# Patient Record
Sex: FEMALE | Race: WHITE | Employment: OTHER | URBAN - METROPOLITAN AREA
[De-identification: names, ages, dates, MRNs, and addresses within clinical notes are randomized per-mention and may not be internally consistent; named-entity substitution may affect disease eponyms.]

---

## 2017-01-10 NOTE — TELEPHONE ENCOUNTER
Returned patient's daughter call patient has been experiencing increased joint pain all over. She has been out of Humira for awhile she is not sure if insurance will cover this or not but would like to see if they would.

## 2017-01-17 ENCOUNTER — OFFICE VISIT (OUTPATIENT)
Dept: RHEUMATOLOGY | Age: 77
End: 2017-01-17

## 2017-01-17 VITALS
DIASTOLIC BLOOD PRESSURE: 80 MMHG | BODY MASS INDEX: 29.77 KG/M2 | OXYGEN SATURATION: 95 % | HEIGHT: 63 IN | TEMPERATURE: 98.3 F | SYSTOLIC BLOOD PRESSURE: 131 MMHG | RESPIRATION RATE: 22 BRPM | WEIGHT: 168 LBS | HEART RATE: 82 BPM

## 2017-01-17 DIAGNOSIS — M05.79 SEROPOSITIVE RHEUMATOID ARTHRITIS OF MULTIPLE SITES (HCC): Primary | ICD-10-CM

## 2017-01-17 RX ORDER — VENLAFAXINE HYDROCHLORIDE 37.5 MG/1
37.5 CAPSULE, EXTENDED RELEASE ORAL DAILY
COMMUNITY

## 2017-01-17 RX ORDER — DOCUSATE SODIUM 100 MG/1
100 CAPSULE, LIQUID FILLED ORAL 2 TIMES DAILY
COMMUNITY
End: 2018-07-07

## 2017-01-17 RX ORDER — TRAZODONE HYDROCHLORIDE 150 MG/1
200 TABLET ORAL
Status: ON HOLD | COMMUNITY
End: 2018-07-07 | Stop reason: CLARIF

## 2017-01-17 RX ORDER — METHOTREXATE 2.5 MG/1
7.5 TABLET ORAL
Status: ON HOLD | COMMUNITY
End: 2018-07-07

## 2017-01-17 RX ORDER — FOLIC ACID 1 MG/1
1 TABLET ORAL DAILY
COMMUNITY

## 2017-01-17 RX ORDER — ACETAMINOPHEN 325 MG/1
650 TABLET ORAL
COMMUNITY

## 2017-01-17 RX ORDER — POLYVINYL ALCOHOL 14 MG/ML
1 SOLUTION/ DROPS OPHTHALMIC AS NEEDED
COMMUNITY
End: 2019-07-16

## 2017-01-17 RX ORDER — CEFUROXIME AXETIL 250 MG/1
250 TABLET ORAL DAILY
COMMUNITY
End: 2017-04-17 | Stop reason: ALTCHOICE

## 2017-01-17 NOTE — PROGRESS NOTES
RHEUMATOLOGY PROBLEM LIST AND CHIEF COMPLAINT  1. Rheumatoid arthritis (1995) - polyarthritis, significant deformity of multiple joints with subluxation, positive JOAN (1:160), positive CCP, positive rheumatoid factor    Therapy History:  Prior NSAIDs:   Prior DMARDs: Remicade (allergy), hydroxychloroquine (not effective)  Current NSAIDs:  Current DMARDs: Humira (stopped in 2010, 2015-04/2016, 1/2017-current), methotrexate (current)    2. Osteoarthritis     INTERVAL HISTORY  This is a 78 y.o.  female. Today, the patient complains of pain in the neck. Severity:  8 on a scale of 0-10. Timing: all day   Context/Associated signs and symptoms: The patient states that she has restarted Humira, but she has only taken one injection because it was just recently approved. She complains of pain in her right knee and left foot. She states that her hands continue to have morning stiffness, but she believes her first Humira injection has helped. She reports that when she was getting a shower from a nurses aid, she scrubbed her foot too hard and that the next day her foot was inflamed. She continues on  Methotrexate 15 mg weekly and Humira 40 mg every 2 weeks. She continues to use steroid drops for her keratitis. She denies any new medical issues or starting any new medications. RHEUMATOLOGY REVIEW OF SYSTEMS   Positives as per history  Negatives as follows:  Mac Ji:  Denies unexplained persistent fevers or weight change  RESPIRATORY:  No pleuritic pain, exertional dyspnea  CARDIOVASCULAR:  Denies chest pain  GASTRO:   Denies heartburn, abdominal pain, nausea, vomiting, diarrhea  SKIN:    Denies rash   MSK:    No morning stiffness >1 hour    PAST MEDICAL HISTORY  Reviewed with patient, significant changes in medical history - no    FAMILY HISTORY  rheumatoid arthritis     PHYSICAL EXAM  Blood pressure 131/80, pulse 82, temperature 98.3 °F (36.8 °C), temperature source Oral, resp.  rate 22, height 5' 2.5\" (1.588 m), weight 168 lb (76.2 kg), SpO2 95 %. GENERAL APPEARANCE: Well-nourished, no acute distress  NECK: No adenopathy  ENT: No oral ulcers  CARDIOVASCULAR: Heart rhythm is regular. No murmur, rub, gallop  CHEST: Normal vesicular breath sounds. No wheezes, rales, pleural friction rubs  ABDOMINAL: The abdomen is soft and nontender. Bowel sounds are normal  SKIN: No rash, palpable purpura, digital ulcer, abnormal thickening   NEUROLOGICAL: antalgic gait, using walker  MUSCULOSKELETAL:   Upper extremities - ulnar deviation with boutonniere deformity and swan-neck deformity of bilateral hands. Subluxation of joints. Heberden's and Vivi's nodes noted. Wrist decreased ROM B/L with tenderness and warmth  Lower extremities - bony prominence, tenderness, warmth over bilateral knee joints R>L. Right knee large effusion. Bilateral ankle swelling. Left foot soft tissue swelling and erythema. LABS, RADIOLOGY AND PROCEDURES   Previous labs reviewed - No new labs    ASSESSMENT  1. Rheumatoid arthritis (Established problem -  Progressive disease) - The patient has restarted Humira and received her first injection. She continues to have active inflammation, but Humira can take up to 3 months to become effective. She had thought she decreased her methotrexate to 7.5 mg weekly because of hair thinning, but after verification from NH she is still on 15mg. I will inject her right knee with 40 mg depo-medrol today. Her left foot appears to have a skin irritation/infection. I do not suspect that the erythema and swelling of her left foot is from inflammation of a joint. I have recommended that she have her foot be evaluated by her PCP or the physician at her nursing home. If this is truly a skin infection, then she may need to hold her Humira until it is healed. Otherwise, she should continue Humira 40 mg q2 weeks and methotrexate 7.5 mg weekly. I will check routine labs today.   2. Keratitis - (Established problem - Progressive disease) - She continues on 3 prednisolone drops in the left eye for this. She should follow up with ophthalmology to see if this can be reduced once humira begins to work. 3. Osteoarthritis - This likely characterizes pain in her hips, knees, and shoulder. OTC meds and tramadol are recommended. 4. Drug therapy monitoring for toxicity (methotrexate) - CBC, BUN, Cr, AST, ALT and albumin every 4-6 months   5. Bone density - bone scan ordered in 12/2015 but has not been performed, she currently uses vitamin D supplementation. PLAN  1. Humira 40 mg every 14 days  2. Methotrexate to 7.5 mg weekly with folic acid 1 mg daily  3. Check CBC, CMP, markers of inflammation (ESR, CRP)  4. Right knee injection    Ada Arellano MD, [address] [place] [address]   Adult and Pediatric Rheumatology     [address] [place] [address], [facility], Phone [phone], Fax [phone]     Visiting  of Pediatrics    Department of Pediatrics, [facility] of [address] [place] [address], Phone [phone], Fax [phone]    cc:  Darya Levine MD    Written by blanco Dodge, as dictated by Gina Soni. Chirag Arellano M.D.     ARTHRITIS AND OSTEOPOROSIS CENTER [facility]  OFFICE PROCEDURE PROGRESS NOTE        Chart reviewed for the following:   Phoenix LIVINGSTON, have reviewed the History, Physical and updated the Allergic reactions for June [facility] performed immediately prior to start of procedure:   Phoenix LIVINGSTON, have performed the following reviews on June Williams prior to the start of the procedure:            * Patient was identified by name and date of birth   * Agreement on procedure being performed was verified  * Risks and Benefits explained to the patient  * Procedure site verified and marked as necessary  * Patient was positioned for comfort  * Consent was signed and verified     Time: 3:40 pm      Date of procedure: 1/17/2017    Procedure performed by: Wang Matias MD    Provider assisted by: none    Patient assisted by: son    How tolerated by patient: tolerated the procedure well with no complications     Post Procedural Pain Scale: 0 - No Hurt    Comments: none      PROCEDURE  After consent was obtained, using sterile technique the right knee was prepped and Depo-medrol 40 mg and 1ml of lidocaine was then injected and the needle withdrawn. The procedure was well tolerated. The patient is asked to continue to rest for 24 hours before resuming regular activities. It may be more painful for the first 1-2 days. Watch for fever, or increased swelling or persistent pain. Call or return to clinic prn if such symptoms occur.

## 2017-01-17 NOTE — MR AVS SNAPSHOT
Visit Information Date & Time Provider Department Dept. Phone Encounter #  
 1/17/2017  2:40 PM Kenneth Rutherford MD Arthritis and [facility] [id number] Upcoming Health Maintenance Date Due DTaP/Tdap/Td series (1 - Tdap) 12/26/1963 BREAST CANCER SCRN MAMMOGRAM 12/26/1992 FOBT Q 1 YEAR AGE 50-75 12/26/1992 ZOSTER VACCINE AGE 60> 12/26/2002 GLAUCOMA SCREENING Q2Y 12/26/2007 Pneumococcal 65+ Low/Medium Risk (1 of 2 - PCV13) 12/26/2007 MEDICARE YEARLY EXAM 12/26/2007 INFLUENZA AGE 9 TO ADULT 8/1/2016 Allergies as of 1/17/2017  Review Complete On: 1/17/2017 By: Adia Colon LPN Severity Noted Reaction Type Reactions Codeine  01/17/2017    Nausea Only Current Immunizations  Never Reviewed No immunizations on file. Not reviewed this visit Vitals BP Pulse Temp Resp Height(growth percentile) Weight(growth percentile) 131/80 (BP 1 Location: Left arm, BP Patient Position: Sitting) 82 98.3 °F (36.8 °C) (Oral) 22 5' 2.5\" (1.588 m) 168 lb (76.2 kg) SpO2 BMI OB Status Smoking Status 95% 30.24 kg/m2 Postmenopausal Former Smoker BMI and BSA Data Body Mass Index Body Surface Area  
 30.24 kg/m 2 1.83 m 2 Preferred Pharmacy Pharmacy Name Phone [facility], [address] [facility] [phone] Your Updated Medication List  
  
   
This list is accurate as of: 1/17/17  3:59 PM.  Always use your most recent med list.  
  
  
  
  
 cefUROXime 250 mg tablet Commonly known as:  CEFTIN Take 250 mg by mouth daily. clonazePAM 0.5 mg tablet Commonly known as:  Melburn End Take 0.5 mg by mouth three (3) times daily. cyclobenzaprine 10 mg tablet Commonly known as:  FLEXERIL Take 1 Tab by mouth daily. diclofenac 0.1 % ophthalmic solution Commonly known as:  VOLTAREN  
  
 divalproex  mg tablet Commonly known as:  DEPAKOTE Take 250 mg by mouth daily. docusate sodium 100 mg capsule Commonly known as:  Miguel Angel Rohit Take 100 mg by mouth two (2) times a day. escitalopram oxalate 20 mg tablet Commonly known as:  Ruth Mederos Take 20 mg by mouth daily. folic acid 1 mg tablet Commonly known as:  Google Take  by mouth daily. * HUMIRA 40 mg/0.8 mL injection Generic drug:  adalimumab  
by SubCUTAneous route. Duplicate * adalimumab 40 mg/0.8 mL Pnkt Commonly known as:  HUMIRA PEN  
0.8 mL by SubCUTAneous route every fourteen (14) days. * HYDROcodone-acetaminophen 5-325 mg per tablet Commonly known as:  Viva Lizama Take 1 Tab by mouth every eight (8) hours as needed. * HYDROcodone-acetaminophen 5-325 mg per tablet Commonly known as:  Viva Lizama Take 1 Tab by mouth every eight (8) hours as needed for Pain. Max Daily Amount: 3 Tabs. levothyroxine 125 mcg tablet Commonly known as:  SYNTHROID  
  
 lovastatin 40 mg tablet Commonly known as:  MEVACOR Take 40 mg by mouth daily. * meclizine 25 mg tablet Commonly known as:  ANTIVERT  
  
 * meclizine 12.5 mg tablet Commonly known as:  ANTIVERT Take 12.5 mg by mouth two (2) times a day. methotrexate 2.5 mg tablet Commonly known as:  Laura Flakes Take 15 mg by mouth every Sunday. Takes 6 tabs  
  
 metoprolol tartrate 25 mg tablet Commonly known as:  LOPRESSOR Take 25 mg by mouth two (2) times a day. mirtazapine 45 mg tablet Commonly known as:  [place] Avery Take 45 mg by mouth nightly. OLANZapine 10 mg tablet Commonly known as:  ZyPREXA Take 10 mg by mouth nightly. Takes 15 mg at bedtime  
  
 polyvinyl alcohol 1.4 % ophthalmic solution Commonly known as:  Martina Oms Administer 1 Drop to both eyes as needed. prednisoLONE acetate 1 % ophthalmic suspension Commonly known as:  PRED FORTE Administer 1 Drop to left eye four (4) times daily. * predniSONE 5 mg tablet Commonly known as:  Shaggy Soni * predniSONE 5 mg tablet Commonly known as:  DELTASONE  
20mg x 3 days, 15mg x 3 days, 10mg x 3 days, 5mg x 3 days SENNA Take 2 Tabs by mouth once. traMADol 50 mg tablet Commonly known as:  ULTRAM  
Take 50 mg by mouth. Takes 4 times a day  
  
 traZODone 150 mg tablet Commonly known as:  Chris José Take 150 mg by mouth nightly. TYLENOL 325 mg tablet Generic drug:  acetaminophen Take  by mouth every four (4) hours as needed for Pain. venlafaxine- mg capsule Commonly known as:  EFFEXOR-XR Take  by mouth daily. VITAMIN D3 1,000 unit Cap Generic drug:  cholecalciferol Take  by mouth. Not Taking  
  
 zolpidem 10 mg tablet Commonly known as:  AMBIEN Take 10 mg by mouth nightly. * Notice: This list has 8 medication(s) that are the same as other medications prescribed for you. Read the directions carefully, and ask your doctor or other care provider to review them with you. Introducing [facility]! Yenifer Bateman introduces eToro patient portal. Now you can access parts of your medical record, email your doctor's office, and request medication refills online. 1. In your internet browser, go to [email or web address] Monster 2. Click on the First Time User? Click Here link in the Sign In box. You will see the New Member Sign Up page. 3. Enter your eToro Access Code exactly as it appears below. You will not need to use this code after youve completed the sign-up process. If you do not sign up before the expiration date, you must request a new code. · eToro Access Code: [email or web address] Expires: 4/17/2017  3:59 PM 
 
4. Enter the last four digits of your Social Security Number (xxxx) and Date of Birth (mm/dd/yyyy) as indicated and click Submit. You will be taken to the next sign-up page. 5. Create a eToro ID.  This will be your eToro login ID and cannot be changed, so think of one that is secure and easy to remember. 6. Create a [employer] password. You can change your password at any time. 7. Enter your Password Reset Question and Answer. This can be used at a later time if you forget your password. 8. Enter your e-mail address. You will receive e-mail notification when new information is available in [address] 9. Click Sign Up. You can now view and download portions of your medical record. 10. Click the Download Summary menu link to download a portable copy of your medical information. If you have questions, please visit the Frequently Asked Questions section of the [employer] website. Remember, [employer] is NOT to be used for urgent needs. For medical emergencies, dial 911. Now available from your iPhone and Android! Please provide this summary of care documentation to your next provider. Your primary care clinician is listed as Riccardo Moritz. If you have any questions after today's visit, please call [phone].

## 2017-01-19 ENCOUNTER — TELEPHONE (OUTPATIENT)
Dept: RHEUMATOLOGY | Age: 77
End: 2017-01-19

## 2017-01-19 LAB
ALBUMIN SERPL-MCNC: 4.3 G/DL (ref 3.5–4.8)
ALBUMIN/GLOB SERPL: 1.3 {RATIO} (ref 1.1–2.5)
ALP SERPL-CCNC: 105 IU/L (ref 39–117)
ALT SERPL-CCNC: 9 IU/L (ref 0–32)
AST SERPL-CCNC: 13 IU/L (ref 0–40)
BASOPHILS # BLD MANUAL: 0.2 X10E3/UL (ref 0–0.2)
BASOPHILS NFR BLD MANUAL: 3 %
BILIRUB SERPL-MCNC: <0.2 MG/DL (ref 0–1.2)
BUN SERPL-MCNC: 13 MG/DL (ref 8–27)
BUN/CREAT SERPL: 20 (ref 11–26)
CALCIUM SERPL-MCNC: 9.6 MG/DL (ref 8.7–10.3)
CHLORIDE SERPL-SCNC: 96 MMOL/L (ref 96–106)
CO2 SERPL-SCNC: 26 MMOL/L (ref 18–29)
CREAT SERPL-MCNC: 0.65 MG/DL (ref 0.57–1)
CRP SERPL-MCNC: 12 MG/L (ref 0–4.9)
DIFFERENTIAL COMMENT, 115260: NORMAL
EOSINOPHIL # BLD MANUAL: 0.2 X10E3/UL (ref 0–0.4)
EOSINOPHIL NFR BLD MANUAL: 3 %
ERYTHROCYTE [DISTWIDTH] IN BLOOD BY AUTOMATED COUNT: 13.5 % (ref 12.3–15.4)
ERYTHROCYTE [SEDIMENTATION RATE] IN BLOOD BY WESTERGREN METHOD: 71 MM/HR (ref 0–40)
GLOBULIN SER CALC-MCNC: 3.4 G/DL (ref 1.5–4.5)
GLUCOSE SERPL-MCNC: 97 MG/DL (ref 65–99)
HCT VFR BLD AUTO: 36.9 % (ref 34–46.6)
HGB BLD-MCNC: 12.6 G/DL (ref 11.1–15.9)
LYMPHOCYTES # BLD MANUAL: 1.5 X10E3/UL (ref 0.7–3.1)
LYMPHOCYTES NFR BLD MANUAL: 24 %
MCH RBC QN AUTO: 31.8 PG (ref 26.6–33)
MCHC RBC AUTO-ENTMCNC: 34.1 G/DL (ref 31.5–35.7)
MCV RBC AUTO: 93 FL (ref 79–97)
MONOCYTES # BLD MANUAL: 0.3 X10E3/UL (ref 0.1–0.9)
MONOCYTES NFR BLD MANUAL: 4 %
NEUTROPHILS # BLD MANUAL: 4.2 X10E3/UL (ref 1.4–7)
NEUTROPHILS NFR BLD MANUAL: 66 %
PLATELET # BLD AUTO: 364 X10E3/UL (ref 150–379)
PLATELET BLD QL SMEAR: ADEQUATE
POTASSIUM SERPL-SCNC: 4.9 MMOL/L (ref 3.5–5.2)
PROT SERPL-MCNC: 7.7 G/DL (ref 6–8.5)
RBC # BLD AUTO: 3.96 X10E6/UL (ref 3.77–5.28)
RBC MORPH BLD: NORMAL
SODIUM SERPL-SCNC: 139 MMOL/L (ref 134–144)
WBC # BLD AUTO: 6.3 X10E3/UL (ref 3.4–10.8)

## 2017-01-19 NOTE — TELEPHONE ENCOUNTER
Per Dr Kevin solano for patient to continue to take 15 mg weekly notified nurse Finn Rodriguez at [facility].

## 2017-01-19 NOTE — TELEPHONE ENCOUNTER
Received a call from Navid Richards at [facility] [phone] where patient resides. She noticed on the after visit summary you were decreasing MTX to 7.5 mg.  Currently patient is taking MTX 15 mg weekly per the nurse patient gets confused at times. For a short period of time patient decreased it when she thought her hair was thinning but then decided to restart the 15 mg weekly. Please advise.

## 2017-01-20 RX ORDER — LIDOCAINE HYDROCHLORIDE 10 MG/ML
1 INJECTION INFILTRATION; PERINEURAL ONCE
Qty: 1 VIAL | Refills: 0
Start: 2017-01-21 | End: 2017-01-21

## 2017-02-10 ENCOUNTER — TELEPHONE (OUTPATIENT)
Dept: RHEUMATOLOGY | Age: 77
End: 2017-02-10

## 2017-02-10 NOTE — TELEPHONE ENCOUNTER
Returned patient's call she thought her knee was feeling better but then she started experiencing the pain & swelling again. Patient would like to be seen for a follow-up appointment. Transferred patient to  for PSR to schedule appointment.

## 2017-02-23 ENCOUNTER — HOSPITAL ENCOUNTER (INPATIENT)
Age: 77
LOS: 5 days | Discharge: NURSING, REHAB OR LONG-TERM CARE | DRG: 812 | End: 2017-03-01
Attending: EMERGENCY MEDICINE | Admitting: INTERNAL MEDICINE
Payer: MEDICARE

## 2017-02-23 ENCOUNTER — APPOINTMENT (OUTPATIENT)
Dept: GENERAL RADIOLOGY | Age: 77
DRG: 812 | End: 2017-02-23
Attending: EMERGENCY MEDICINE
Payer: MEDICARE

## 2017-02-23 DIAGNOSIS — R42 ORTHOSTATIC LIGHTHEADEDNESS: Primary | ICD-10-CM

## 2017-02-23 DIAGNOSIS — D50.8 OTHER IRON DEFICIENCY ANEMIA: ICD-10-CM

## 2017-02-23 DIAGNOSIS — M25.551 PAIN OF BOTH HIP JOINTS: ICD-10-CM

## 2017-02-23 DIAGNOSIS — R55 SYNCOPE AND COLLAPSE: ICD-10-CM

## 2017-02-23 DIAGNOSIS — M25.552 PAIN OF BOTH HIP JOINTS: ICD-10-CM

## 2017-02-23 LAB
ALBUMIN SERPL BCP-MCNC: 2.9 G/DL (ref 3.5–5)
ALBUMIN/GLOB SERPL: 0.8 {RATIO} (ref 1.1–2.2)
ALP SERPL-CCNC: 90 U/L (ref 45–117)
ALT SERPL-CCNC: 33 U/L (ref 12–78)
ANION GAP BLD CALC-SCNC: 10 MMOL/L (ref 5–15)
AST SERPL W P-5'-P-CCNC: 29 U/L (ref 15–37)
BASOPHILS # BLD AUTO: 0 K/UL
BASOPHILS # BLD: 0 %
BILIRUB SERPL-MCNC: 0.2 MG/DL (ref 0.2–1)
BUN SERPL-MCNC: 14 MG/DL (ref 6–20)
BUN/CREAT SERPL: 13 (ref 12–20)
CALCIUM SERPL-MCNC: 8 MG/DL (ref 8.5–10.1)
CHLORIDE SERPL-SCNC: 99 MMOL/L (ref 97–108)
CK SERPL-CCNC: 31 U/L (ref 26–192)
CO2 SERPL-SCNC: 27 MMOL/L (ref 21–32)
CREAT SERPL-MCNC: 1.11 MG/DL (ref 0.55–1.02)
EOSINOPHIL # BLD: 0 K/UL
EOSINOPHIL NFR BLD: 0 %
ERYTHROCYTE [DISTWIDTH] IN BLOOD BY AUTOMATED COUNT: 13.9 % (ref 11.5–14.5)
GLOBULIN SER CALC-MCNC: 3.5 G/DL (ref 2–4)
GLUCOSE SERPL-MCNC: 125 MG/DL (ref 65–100)
HCT VFR BLD AUTO: 26.6 % (ref 35–47)
HGB BLD-MCNC: 9 G/DL (ref 11.5–16)
INR PPP: 1.1 (ref 0.9–1.1)
LYMPHOCYTES # BLD AUTO: 4 %
LYMPHOCYTES # BLD: 0.3 K/UL
MCH RBC QN AUTO: 32 PG (ref 26–34)
MCHC RBC AUTO-ENTMCNC: 33.8 G/DL (ref 30–36.5)
MCV RBC AUTO: 94.7 FL (ref 80–99)
MONOCYTES # BLD: 0.6 K/UL
MONOCYTES NFR BLD AUTO: 7 %
NEUTS BAND NFR BLD MANUAL: 5 %
NEUTS SEG # BLD: 7.2 K/UL
NEUTS SEG NFR BLD AUTO: 84 %
PLATELET # BLD AUTO: 230 K/UL (ref 150–400)
POTASSIUM SERPL-SCNC: 3.5 MMOL/L (ref 3.5–5.1)
PROT SERPL-MCNC: 6.4 G/DL (ref 6.4–8.2)
PROTHROMBIN TIME: 10.9 SEC (ref 9–11.1)
RBC # BLD AUTO: 2.81 M/UL (ref 3.8–5.2)
RBC MORPH BLD: ABNORMAL
SODIUM SERPL-SCNC: 136 MMOL/L (ref 136–145)
TROPONIN I SERPL-MCNC: <0.04 NG/ML
WBC # BLD AUTO: 8.1 K/UL (ref 3.6–11)
WBC MORPH BLD: ABNORMAL

## 2017-02-23 PROCEDURE — 93005 ELECTROCARDIOGRAM TRACING: CPT

## 2017-02-23 PROCEDURE — 84484 ASSAY OF TROPONIN QUANT: CPT | Performed by: EMERGENCY MEDICINE

## 2017-02-23 PROCEDURE — 74011250636 HC RX REV CODE- 250/636: Performed by: EMERGENCY MEDICINE

## 2017-02-23 PROCEDURE — 85610 PROTHROMBIN TIME: CPT | Performed by: EMERGENCY MEDICINE

## 2017-02-23 PROCEDURE — 77030013169 SET IV BLD ICUM -A

## 2017-02-23 PROCEDURE — 82550 ASSAY OF CK (CPK): CPT | Performed by: EMERGENCY MEDICINE

## 2017-02-23 PROCEDURE — 96360 HYDRATION IV INFUSION INIT: CPT

## 2017-02-23 PROCEDURE — 73521 X-RAY EXAM HIPS BI 2 VIEWS: CPT

## 2017-02-23 PROCEDURE — 85025 COMPLETE CBC W/AUTO DIFF WBC: CPT | Performed by: EMERGENCY MEDICINE

## 2017-02-23 PROCEDURE — 36415 COLL VENOUS BLD VENIPUNCTURE: CPT | Performed by: EMERGENCY MEDICINE

## 2017-02-23 PROCEDURE — 80053 COMPREHEN METABOLIC PANEL: CPT | Performed by: EMERGENCY MEDICINE

## 2017-02-23 PROCEDURE — 99285 EMERGENCY DEPT VISIT HI MDM: CPT

## 2017-02-23 RX ADMIN — SODIUM CHLORIDE 1000 ML: 900 INJECTION, SOLUTION INTRAVENOUS at 23:16

## 2017-02-23 NOTE — IP AVS SNAPSHOT
[address] [facility] 
[phone] Patient: Niharika Galaviz MRN: [id number] :1940 You are allergic to the following Allergen Reactions Codeine Nausea Only Recent Documentation Height  
  
  
  
  
  
 1.575 m Emergency Contacts Name Discharge Info Relation Home Work Mobile Reva Garcia  Daughter [21] [phone] Juan Mckeon [24] [phone] Nadira Roman  Other Relative [6] [phone] About your hospitalization You were admitted on:  2017 You last received care in the:  [facility] 3 NEUROSCIENCE TELEMETRY You were discharged on:  2017 Unit phone number:  [phone] Why you were hospitalized Your primary diagnosis was:  Not on File Your diagnoses also included:  Syncope Providers Seen During Your Hospitalizations Provider Role Specialty Primary office phone Peng Menon MD Attending Provider Emergency Medicine [phone] Tatiana Blankenship MD Attending Provider Internal Medicine [phone] Your Primary Care Physician (PCP) Primary Care Physician Office Phone Office Fax Jerrod Plummer [phone] Follow-up Information Follow up With Details Comments Contact Info Darya Levine MD In 5 days  [address] [facility] [address] [place] [address] 
[phone] Brandi Ward MD In 2 weeks  [address] [facility] 
[phone] Current Discharge Medication List  
  
START taking these medications Dose & Instructions Dispensing Information Comments Morning Noon Evening Bedtime  
 ferrous sulfate 325 mg (65 mg iron) tablet Commonly known as:  Iron (Ferrous Sulfate) Your next dose is: Today, Tomorrow Other:  _________ Dose:  325 mg Take 1 Tab by mouth two (2) times a day. Quantity:  30 Tab Refills:  0  
     
   
   
   
  
 fluconazole 200 mg tablet Commonly known as:  DIFLUCAN Your next dose is: Today, Tomorrow Other:  _________ Dose:  200 mg Take 1 Tab by mouth daily for 13 days. FDA advises cautious prescribing of oral fluconazole in pregnancy. Quantity:  13 Tab Refills:  0  
     
   
   
   
  
 pantoprazole 40 mg tablet Commonly known as:  PROTONIX Your next dose is: Today, Tomorrow Other:  _________ Dose:  40 mg Take 1 Tab by mouth Daily (before breakfast). Quantity:  30 Tab Refills:  0 CONTINUE these medications which have CHANGED Dose & Instructions Dispensing Information Comments Morning Noon Evening Bedtime  
 predniSONE 5 mg tablet Commonly known as:  Cassy Gelineau What changed:   
- additional instructions - Another medication with the same name was removed. Continue taking this medication, and follow the directions you see here. Your next dose is: Today, Tomorrow Other:  _________ 20mg x 3 days, 15mg x 3 days, 10mg x 3 days, 5mg x 3 days Quantity:  30 Tab Refills:  1 CONTINUE these medications which have NOT CHANGED Dose & Instructions Dispensing Information Comments Morning Noon Evening Bedtime  
 cefUROXime 250 mg tablet Commonly known as:  CEFTIN Your next dose is: Today, Tomorrow Other:  _________ Dose:  250 mg Take 250 mg by mouth daily. Refills:  0  
     
   
   
   
  
 clonazePAM 0.5 mg tablet Commonly known as:  Gwyndolucia Rasher Your next dose is: Today, Tomorrow Other:  _________ Dose:  0.5 mg Take 0.5 mg by mouth three (3) times daily. Refills:  0  
     
   
   
   
  
 docusate sodium 100 mg capsule Commonly known as:  Ledell Mohsen Your next dose is: Today, Tomorrow Other:  _________  Dose:  100 mg  
 Take 100 mg by mouth two (2) times a day. Refills:  0  
     
   
   
   
  
 folic acid 1 mg tablet Commonly known as:  Google Your next dose is: Today, Tomorrow Other:  _________ Take  by mouth daily. Refills:  0  
     
   
   
   
  
 * HUMIRA 40 mg/0.8 mL injection Generic drug:  adalimumab Your next dose is: Today, Tomorrow Other:  _________  
   
   
 by SubCUTAneous route. Duplicate Refills:  0  
     
   
   
   
  
 * adalimumab 40 mg/0.8 mL Pnkt Commonly known as:  HUMIRA PEN Your next dose is: Today, Tomorrow Other:  _________ Dose:  40 mg  
0.8 mL by SubCUTAneous route every fourteen (14) days. Quantity:  2 Kit Refills:  6 HYDROcodone-acetaminophen 5-325 mg per tablet Commonly known as:  Laura Arts Your next dose is: Today, Tomorrow Other:  _________ Dose:  1 Tab Take 1 Tab by mouth every eight (8) hours as needed. Max Daily Amount: 3 Tabs. Quantity:  10 Tab Refills:  0  
     
   
   
   
  
 lovastatin 40 mg tablet Commonly known as:  MEVACOR Your next dose is: Today, Tomorrow Other:  _________ Dose:  40 mg Take 40 mg by mouth daily. Refills:  0  
     
   
   
   
  
 methotrexate 2.5 mg tablet Commonly known as:  Neosho Blanca Your next dose is: Today, Tomorrow Other:  _________ Dose:  15 mg Take 15 mg by mouth every Sunday. Takes 6 tabs Refills:  0  
     
   
   
   
  
 metoprolol tartrate 25 mg tablet Commonly known as:  LOPRESSOR Your next dose is: Today, Tomorrow Other:  _________ Dose:  25 mg Take 25 mg by mouth two (2) times a day. Refills:  0  
     
   
   
   
  
 mirtazapine 45 mg tablet Commonly known as:  Sadie Martin Your next dose is: Today, Tomorrow Other:  _________ Dose:  45 mg Take 45 mg by mouth nightly. Refills:  0 OLANZapine 10 mg tablet Commonly known as:  ZyPREXA Your next dose is: Today, Tomorrow Other:  _________ Dose:  10 mg Take 10 mg by mouth nightly. Takes 15 mg at bedtime Refills:  0  
     
   
   
   
  
 polyvinyl alcohol 1.4 % ophthalmic solution Commonly known as:  Homa Saliva Your next dose is: Today, Tomorrow Other:  _________ Dose:  1 Drop Administer 1 Drop to both eyes as needed. Refills:  0  
     
   
   
   
  
 prednisoLONE acetate 1 % ophthalmic suspension Commonly known as:  PRED FORTE Your next dose is: Today, Tomorrow Other:  _________ Dose:  1 Drop Administer 1 Drop to left eye three (3) times daily. Refills:  0 SENNA Your next dose is: Today, Tomorrow Other:  _________ Dose:  2 Tab Take 2 Tabs by mouth once. Refills:  0  
     
   
   
   
  
 traZODone 150 mg tablet Commonly known as:  Emaline Sober Your next dose is: Today, Tomorrow Other:  _________ Dose:  150 mg Take 150 mg by mouth nightly. Refills:  0  
     
   
   
   
  
 TYLENOL 325 mg tablet Generic drug:  acetaminophen Your next dose is: Today, Tomorrow Other:  _________ Take  by mouth every four (4) hours as needed for Pain. Refills:  0  
     
   
   
   
  
 venlafaxine- mg capsule Commonly known as:  EFFEXOR-XR Your next dose is: Today, Tomorrow Other:  _________ Take  by mouth daily. Refills:  0  
     
   
   
   
  
 * Notice: This list has 2 medication(s) that are the same as other medications prescribed for you. Read the directions carefully, and ask your doctor or other care provider to review them with you. STOP taking these medications   
 traMADol 50 mg tablet Commonly known as:  ULTRAM  
   
  
  
  
Where to Get Your Medications Information on where to get these meds will be given to you by the nurse or doctor. ! Ask your nurse or doctor about these medications  
  ferrous sulfate 325 mg (65 mg iron) tablet  
 fluconazole 200 mg tablet HYDROcodone-acetaminophen 5-325 mg per tablet  
 pantoprazole 40 mg tablet Discharge Instructions None Discharge Orders None [facility] Announcement We are excited to announce that we are making your provider's discharge notes available to you in IdealSeat. You will see these notes when they are completed and signed by the physician that discharged you from your recent hospital stay. If you have any questions or concerns about any information you see in IdealSeat, please call the Health Information Department where you were seen or reach out to your Primary Care Provider for more information about your plan of care. Introducing [facility] SERVICES! Janine Aden introduces IdealSeat patient portal. Now you can access parts of your medical record, email your doctor's office, and request medication refills online. 1. In your internet browser, go to [email or web address] 2. Click on the First Time User? Click Here link in the Sign In box. You will see the New Member Sign Up page. 3. Enter your Ide[employer]Se[employer] Access Code exactly as it appears below. You will not need to use this code after youve completed the sign-up process. If you do not sign up before the expiration date, you must request a new code. · Ide[employer]Seat Access Code: [email or web address] Expires: 4/17/2017  3:59 PM 
 
4. Enter the last four digits of your Social Security Number (xxxx) and Date of Birth (mm/dd/yyyy) as indicated and click Submit. You will be taken to the next sign-up page. 5. Create a IdealSeat ID. This will be your Ide[employer]Seat login ID and cannot be changed, so think of one that is secure and easy to remember. 6. Create a Ide[employer]Seat password. You can change your password at any time. 7. Enter your Password Reset Question and Answer. This can be used at a later time if you forget your password. 8. Enter your e-mail address. You will receive e-mail notification when new information is available in [address] 9. Click Sign Up. You can now view and download portions of your medical record. 10. Click the Download Summary menu link to download a portable copy of your medical information. If you have questions, please visit the Frequently Asked Questions section of the [employer] website. Remember, [employer] is NOT to be used for urgent needs. For medical emergencies, dial 911. Now available from your iPhone and Android! General Information Please provide this summary of care documentation to your next provider. Patient Signature:  ____________________________________________________________ Date:  ____________________________________________________________  
  
Mandeep Artist Provider Signature:  ____________________________________________________________ Date:  ____________________________________________________________

## 2017-02-23 NOTE — IP AVS SNAPSHOT
Current Discharge Medication List  
  
Take these medications at their scheduled times Dose & Instructions Dispensing Information Comments Morning Noon Evening Bedtime * adalimumab 40 mg/0.8 mL Pnkt Commonly known as:  HUMIRA PEN Your next dose is: Today, Tomorrow Other:  ____________ Dose:  40 mg  
0.8 mL by SubCUTAneous route every fourteen (14) days. Quantity:  2 Kit Refills:  6  
     
   
   
   
  
 cefUROXime 250 mg tablet Commonly known as:  CEFTIN Your next dose is: Today, Tomorrow Other:  ____________ Dose:  250 mg Take 250 mg by mouth daily. Refills:  0  
     
   
   
   
  
 clonazePAM 0.5 mg tablet Commonly known as:  Fay Aw Your next dose is: Today, Tomorrow Other:  ____________ Dose:  0.5 mg Take 0.5 mg by mouth three (3) times daily. Refills:  0  
     
   
   
   
  
 docusate sodium 100 mg capsule Commonly known as:  Miguel Angel Rohit Your next dose is: Today, Tomorrow Other:  ____________ Dose:  100 mg Take 100 mg by mouth two (2) times a day. Refills:  0  
     
   
   
   
  
 ferrous sulfate 325 mg (65 mg iron) tablet Commonly known as:  Iron (Ferrous Sulfate) Your next dose is: Today, Tomorrow Other:  ____________ Dose:  325 mg Take 1 Tab by mouth two (2) times a day. Quantity:  30 Tab Refills:  0  
     
   
   
   
  
 fluconazole 200 mg tablet Commonly known as:  DIFLUCAN Your next dose is: Today, Tomorrow Other:  ____________ Dose:  200 mg Take 1 Tab by mouth daily for 13 days. FDA advises cautious prescribing of oral fluconazole in pregnancy. Quantity:  13 Tab Refills:  0  
     
   
   
   
  
 folic acid 1 mg tablet Commonly known as:  Google Your next dose is: Today, Tomorrow Other:  ____________ Take  by mouth daily. Refills:  0 lovastatin 40 mg tablet Commonly known as:  MEVACOR Your next dose is: Today, Tomorrow Other:  ____________ Dose:  40 mg Take 40 mg by mouth daily. Refills:  0  
     
   
   
   
  
 methotrexate 2.5 mg tablet Commonly known as:  Rina Schimke Your next dose is: Today, Tomorrow Other:  ____________ Dose:  15 mg Take 15 mg by mouth every Sunday. Takes 6 tabs Refills:  0  
     
   
   
   
  
 metoprolol tartrate 25 mg tablet Commonly known as:  LOPRESSOR Your next dose is: Today, Tomorrow Other:  ____________ Dose:  25 mg Take 25 mg by mouth two (2) times a day. Refills:  0  
     
   
   
   
  
 mirtazapine 45 mg tablet Commonly known as:  Louana Brooklyn Your next dose is: Today, Tomorrow Other:  ____________ Dose:  45 mg Take 45 mg by mouth nightly. Refills:  0  
     
   
   
   
  
 OLANZapine 10 mg tablet Commonly known as:  ZyPREXA Your next dose is: Today, Tomorrow Other:  ____________ Dose:  10 mg Take 10 mg by mouth nightly. Takes 15 mg at bedtime Refills:  0  
     
   
   
   
  
 pantoprazole 40 mg tablet Commonly known as:  PROTONIX Your next dose is: Today, Tomorrow Other:  ____________ Dose:  40 mg Take 1 Tab by mouth Daily (before breakfast). Quantity:  30 Tab Refills:  0  
     
   
   
   
  
 prednisoLONE acetate 1 % ophthalmic suspension Commonly known as:  PRED FORTE Your next dose is: Today, Tomorrow Other:  ____________ Dose:  1 Drop Administer 1 Drop to left eye three (3) times daily. Refills:  0 SENNA Your next dose is: Today, Tomorrow Other:  ____________ Dose:  2 Tab Take 2 Tabs by mouth once. Refills:  0  
     
   
   
   
  
 traZODone 150 mg tablet Commonly known as:  Maravilla Antonio Your next dose is: Today, Tomorrow Other:  ____________ Dose:  150 mg Take 150 mg by mouth nightly. Refills:  0  
     
   
   
   
  
 venlafaxine- mg capsule Commonly known as:  EFFEXOR-XR Your next dose is: Today, Tomorrow Other:  ____________ Take  by mouth daily. Refills:  0  
     
   
   
   
  
 * Notice: This list has 1 medication(s) that are the same as other medications prescribed for you. Read the directions carefully, and ask your doctor or other care provider to review them with you. Take these medications as needed Dose & Instructions Dispensing Information Comments Morning Noon Evening Bedtime HYDROcodone-acetaminophen 5-325 mg per tablet Commonly known as:  Martha Fothergill Your next dose is: Today, Tomorrow Other:  ____________ Dose:  1 Tab Take 1 Tab by mouth every eight (8) hours as needed. Max Daily Amount: 3 Tabs. Quantity:  10 Tab Refills:  0  
     
   
   
   
  
 polyvinyl alcohol 1.4 % ophthalmic solution Commonly known as:  Green Grout Your next dose is: Today, Tomorrow Other:  ____________ Dose:  1 Drop Administer 1 Drop to both eyes as needed. Refills:  0  
     
   
   
   
  
 TYLENOL 325 mg tablet Generic drug:  acetaminophen Your next dose is: Today, Tomorrow Other:  ____________ Take  by mouth every four (4) hours as needed for Pain. Refills:  0 Take these medications as directed Dose & Instructions Dispensing Information Comments Morning Noon Evening Bedtime * HUMIRA 40 mg/0.8 mL injection Generic drug:  adalimumab Your next dose is: Today, Tomorrow Other:  ____________  
   
   
 by SubCUTAneous route. Duplicate Refills:  0  
     
   
   
   
  
 predniSONE 5 mg tablet Commonly known as:  Randall Fossa Your next dose is: Today, Tomorrow Other:  ____________ 20mg x 3 days, 15mg x 3 days, 10mg x 3 days, 5mg x 3 days Quantity:  30 Tab Refills:  1  
     
   
   
   
  
 * Notice: This list has 1 medication(s) that are the same as other medications prescribed for you. Read the directions carefully, and ask your doctor or other care provider to review them with you. Where to Get Your Medications Information about where to get these medications is not yet available ! Ask your nurse or doctor about these medications  
  ferrous sulfate 325 mg (65 mg iron) tablet  
 fluconazole 200 mg tablet HYDROcodone-acetaminophen 5-325 mg per tablet  
 pantoprazole 40 mg tablet

## 2017-02-23 NOTE — IP AVS SNAPSHOT
Summary of Care Report The Summary of Care report has been created to help improve care coordination. Users with access to [employer] or [address] (Web-based application) may access additional patient information including the Discharge Summary. If you are not currently a [address] user and need more information, please call the number listed below in the [place] [address] section and ask to be connected with Medical Records. Facility Information Name Address Phone [address] [address] [phone] Patient Information Patient Name Sex CHEPE Hdz ([id number]) Female 1940 Discharge Information Admitting Provider Service Area Unit  
 Arsenio Mccartney MD / [facility] [place]  / [phone] Discharge Provider Discharge Date/Time Discharge Disposition Destination Elizabeth Parmar MD / [phone] 3/1/2017 Morning (Pending) SNF (none) Patient Language Language ENGLISH [13] Problem List as of 3/1/2017  Date Reviewed: 2017 Codes Priority Class Noted - Resolved Syncope ICD-10-CM: R55 
ICD-9-CM: 780.2   2017 - Present You are allergic to the following Allergen Reactions Codeine Nausea Only Current Discharge Medication List  
  
START taking these medications Dose & Instructions Dispensing Information Comments  
 ferrous sulfate 325 mg (65 mg iron) tablet Commonly known as:  Iron (Ferrous Sulfate) Dose:  325 mg Take 1 Tab by mouth two (2) times a day. Quantity:  30 Tab Refills:  0  
   
 fluconazole 200 mg tablet Commonly known as:  DIFLUCAN Dose:  200 mg Take 1 Tab by mouth daily for 13 days. FDA advises cautious prescribing of oral fluconazole in pregnancy. Quantity:  13 Tab Refills:  0 pantoprazole 40 mg tablet Commonly known as:  PROTONIX Dose:  40 mg Take 1 Tab by mouth Daily (before breakfast). Quantity:  30 Tab Refills:  0 CONTINUE these medications which have CHANGED Dose & Instructions Dispensing Information Comments  
 predniSONE 5 mg tablet Commonly known as:  Leila Moreno What changed:   
- additional instructions - Another medication with the same name was removed. Continue taking this medication, and follow the directions you see here. 20mg x 3 days, 15mg x 3 days, 10mg x 3 days, 5mg x 3 days Quantity:  30 Tab Refills:  1 CONTINUE these medications which have NOT CHANGED Dose & Instructions Dispensing Information Comments  
 cefUROXime 250 mg tablet Commonly known as:  CEFTIN Dose:  250 mg Take 250 mg by mouth daily. Refills:  0  
   
 clonazePAM 0.5 mg tablet Commonly known as:  Anup Forde Dose:  0.5 mg Take 0.5 mg by mouth three (3) times daily. Refills:  0  
   
 docusate sodium 100 mg capsule Commonly known as:  Beckie Edel Dose:  100 mg Take 100 mg by mouth two (2) times a day. Refills:  0  
   
 folic acid 1 mg tablet Commonly known as:  Google Take  by mouth daily. Refills:  0  
   
 * HUMIRA 40 mg/0.8 mL injection Generic drug:  adalimumab  
 by SubCUTAneous route. Duplicate Refills:  0  
   
 * adalimumab 40 mg/0.8 mL Pnkt Commonly known as:  HUMIRA PEN Dose:  40 mg  
0.8 mL by SubCUTAneous route every fourteen (14) days. Quantity:  2 Kit Refills:  6 HYDROcodone-acetaminophen 5-325 mg per tablet Commonly known as:  Jenise Oyster Dose:  1 Tab Take 1 Tab by mouth every eight (8) hours as needed. Max Daily Amount: 3 Tabs. Quantity:  10 Tab Refills:  0  
   
 lovastatin 40 mg tablet Commonly known as:  MEVACOR Dose:  40 mg Take 40 mg by mouth daily. Refills:  0  
   
 methotrexate 2.5 mg tablet Commonly known as:  Navneet Ace  Dose:  15 mg  
 Take 15 mg by mouth every Sunday. Takes 6 tabs Refills:  0  
   
 metoprolol tartrate 25 mg tablet Commonly known as:  LOPRESSOR Dose:  25 mg Take 25 mg by mouth two (2) times a day. Refills:  0  
   
 mirtazapine 45 mg tablet Commonly known as:  Spenser Pleva Dose:  45 mg Take 45 mg by mouth nightly. Refills:  0  
   
 OLANZapine 10 mg tablet Commonly known as:  ZyPREXA Dose:  10 mg Take 10 mg by mouth nightly. Takes 15 mg at bedtime Refills:  0  
   
 polyvinyl alcohol 1.4 % ophthalmic solution Commonly known as:  Onalee Platts Dose:  1 Drop Administer 1 Drop to both eyes as needed. Refills:  0  
   
 prednisoLONE acetate 1 % ophthalmic suspension Commonly known as:  PRED FORTE Dose:  1 Drop Administer 1 Drop to left eye three (3) times daily. Refills:  0 SENNA Dose:  2 Tab Take 2 Tabs by mouth once. Refills:  0  
   
 traZODone 150 mg tablet Commonly known as:  Saint Rumps Dose:  150 mg Take 150 mg by mouth nightly. Refills:  0  
   
 TYLENOL 325 mg tablet Generic drug:  acetaminophen Take  by mouth every four (4) hours as needed for Pain. Refills:  0  
   
 venlafaxine- mg capsule Commonly known as:  EFFEXOR-XR Take  by mouth daily. Refills:  0  
   
 * Notice: This list has 2 medication(s) that are the same as other medications prescribed for you. Read the directions carefully, and ask your doctor or other care provider to review them with you. STOP taking these medications Comments  
 traMADol 50 mg tablet Commonly known as:  ULTRAM  
   
   
  
  
  
Surgery Information ID Date/Time Status Primary Surgeon All Procedures Location [id number] 2/28/2017 1200 Posted Amanda Coyne MD ESOPHAGOGASTRODUODENOSCOPY (EGD) COLONOSCOPY 
ENDOSCOPIC BRUSHING 
ESOPHAGOGASTRODUODENAL (EGD) BIOPSY [facility] ENDOSCOPY Follow-up Information Follow up With Details Comments Contact Info Hamilton Damon MD In 5 days  [address] [facility] 
[phone] Ba Mendoza MD In 2 weeks  [address] [facility] 
[phone] Discharge Instructions None Chart Review Routing History Recipient Method Report Sent By Antonia Willoughby MD  
Fax: [phone] Phone: [phone] Fax Notes Report Morgan Jaramillo MD [[id number]] 9/20/2016  5:37 PM 9/20/2016 Channing Vasquez MD  
Fax: [phone] Phone: [phone] Fax Notes Report Morgan Jaramillo MD [[id number]] 9/20/2016  5:37 PM 9/20/2016 Hamilton Damon MD  
Phone: [phone] In Basket IP Auto Routed Notes Fletcher Ken MD [[id number]] 2/24/2017  4:17 AM 02/24/2017 Hamilton Damon MD  
Phone: [phone] In Basket IP Auto Routed Notes Fletcher Ken MD [[id number]] 2/24/2017  4:19 AM 02/24/2017 Hamilton Damon MD  
Phone: [phone] In Basket IP Auto Routed Notes Faustino David MD [[id number]] 3/1/2017  7:47 AM 03/01/2017 Hamilton Damon MD  
Phone: [phone] In Basket IP Auto Routed Notes Faustino David MD [[id number]] 3/1/2017  7:52 AM 03/01/2017 Hamilton Damon MD  
Phone: [phone] In Basket IP Auto Routed Notes Faustino David MD [[id number]] 3/1/2017 11:48 AM 03/01/2017

## 2017-02-24 PROBLEM — R55 SYNCOPE: Status: ACTIVE | Noted: 2017-02-24

## 2017-02-24 LAB
ANION GAP BLD CALC-SCNC: 9 MMOL/L (ref 5–15)
APPEARANCE UR: ABNORMAL
ATRIAL RATE: 111 BPM
BACTERIA URNS QL MICRO: ABNORMAL /HPF
BILIRUB UR QL: NEGATIVE
BUN SERPL-MCNC: 15 MG/DL (ref 6–20)
BUN/CREAT SERPL: 21 (ref 12–20)
CALCIUM SERPL-MCNC: 7.3 MG/DL (ref 8.5–10.1)
CALCULATED P AXIS, ECG09: 63 DEGREES
CALCULATED R AXIS, ECG10: -25 DEGREES
CALCULATED T AXIS, ECG11: 36 DEGREES
CHLORIDE SERPL-SCNC: 102 MMOL/L (ref 97–108)
CO2 SERPL-SCNC: 26 MMOL/L (ref 21–32)
COLOR UR: ABNORMAL
CREAT SERPL-MCNC: 0.7 MG/DL (ref 0.55–1.02)
DIAGNOSIS, 93000: NORMAL
EPITH CASTS URNS QL MICRO: ABNORMAL /LPF
ERYTHROCYTE [DISTWIDTH] IN BLOOD BY AUTOMATED COUNT: 14.1 % (ref 11.5–14.5)
FERRITIN SERPL-MCNC: 115 NG/ML (ref 8–252)
GLUCOSE SERPL-MCNC: 119 MG/DL (ref 65–100)
GLUCOSE UR STRIP.AUTO-MCNC: NEGATIVE MG/DL
HAPTOGLOB SERPL-MCNC: 203 MG/DL (ref 30–200)
HCT VFR BLD AUTO: 22.6 % (ref 35–47)
HCT VFR BLD AUTO: 22.9 % (ref 35–47)
HCT VFR BLD AUTO: 27.5 % (ref 35–47)
HEMOCCULT STL QL: NEGATIVE
HGB BLD-MCNC: 7.6 G/DL (ref 11.5–16)
HGB BLD-MCNC: 7.7 G/DL (ref 11.5–16)
HGB BLD-MCNC: 9.3 G/DL (ref 11.5–16)
HGB UR QL STRIP: ABNORMAL
IRON SATN MFR SERPL: 9 % (ref 20–50)
IRON SERPL-MCNC: 16 UG/DL (ref 35–150)
KETONES UR QL STRIP.AUTO: NEGATIVE MG/DL
LEUKOCYTE ESTERASE UR QL STRIP.AUTO: ABNORMAL
MCH RBC QN AUTO: 31.5 PG (ref 26–34)
MCHC RBC AUTO-ENTMCNC: 33.2 G/DL (ref 30–36.5)
MCV RBC AUTO: 95 FL (ref 80–99)
NITRITE UR QL STRIP.AUTO: NEGATIVE
P-R INTERVAL, ECG05: 156 MS
PH UR STRIP: 6 [PH] (ref 5–8)
PLATELET # BLD AUTO: 176 K/UL (ref 150–400)
POTASSIUM SERPL-SCNC: 3.3 MMOL/L (ref 3.5–5.1)
PROT UR STRIP-MCNC: NEGATIVE MG/DL
Q-T INTERVAL, ECG07: 328 MS
QRS DURATION, ECG06: 82 MS
QTC CALCULATION (BEZET), ECG08: 446 MS
RBC # BLD AUTO: 2.41 M/UL (ref 3.8–5.2)
RBC #/AREA URNS HPF: ABNORMAL /HPF (ref 0–5)
RETICS/RBC NFR AUTO: 0.7 % (ref 0.7–2.1)
SODIUM SERPL-SCNC: 137 MMOL/L (ref 136–145)
SP GR UR REFRACTOMETRY: 1.01 (ref 1–1.03)
TIBC SERPL-MCNC: 185 UG/DL (ref 250–450)
TSH SERPL DL<=0.05 MIU/L-ACNC: 1.59 UIU/ML (ref 0.36–3.74)
UA: UC IF INDICATED,UAUC: ABNORMAL
UROBILINOGEN UR QL STRIP.AUTO: 0.2 EU/DL (ref 0.2–1)
VENTRICULAR RATE, ECG03: 111 BPM
VIT B12 SERPL-MCNC: 235 PG/ML (ref 211–911)
WBC # BLD AUTO: 4.7 K/UL (ref 3.6–11)
WBC URNS QL MICRO: ABNORMAL /HPF (ref 0–4)

## 2017-02-24 PROCEDURE — 30233N1 TRANSFUSION OF NONAUTOLOGOUS RED BLOOD CELLS INTO PERIPHERAL VEIN, PERCUTANEOUS APPROACH: ICD-10-PCS | Performed by: INTERNAL MEDICINE

## 2017-02-24 PROCEDURE — 74011636637 HC RX REV CODE- 636/637: Performed by: INTERNAL MEDICINE

## 2017-02-24 PROCEDURE — 85045 AUTOMATED RETICULOCYTE COUNT: CPT | Performed by: INTERNAL MEDICINE

## 2017-02-24 PROCEDURE — 74011000250 HC RX REV CODE- 250: Performed by: INTERNAL MEDICINE

## 2017-02-24 PROCEDURE — 93306 TTE W/DOPPLER COMPLETE: CPT

## 2017-02-24 PROCEDURE — 82607 VITAMIN B-12: CPT | Performed by: INTERNAL MEDICINE

## 2017-02-24 PROCEDURE — 87077 CULTURE AEROBIC IDENTIFY: CPT | Performed by: EMERGENCY MEDICINE

## 2017-02-24 PROCEDURE — 85018 HEMOGLOBIN: CPT | Performed by: INTERNAL MEDICINE

## 2017-02-24 PROCEDURE — 36430 TRANSFUSION BLD/BLD COMPNT: CPT

## 2017-02-24 PROCEDURE — 74011250637 HC RX REV CODE- 250/637: Performed by: EMERGENCY MEDICINE

## 2017-02-24 PROCEDURE — 74011250636 HC RX REV CODE- 250/636: Performed by: INTERNAL MEDICINE

## 2017-02-24 PROCEDURE — 86900 BLOOD TYPING SEROLOGIC ABO: CPT | Performed by: INTERNAL MEDICINE

## 2017-02-24 PROCEDURE — 74011250637 HC RX REV CODE- 250/637: Performed by: INTERNAL MEDICINE

## 2017-02-24 PROCEDURE — 81001 URINALYSIS AUTO W/SCOPE: CPT | Performed by: EMERGENCY MEDICINE

## 2017-02-24 PROCEDURE — 36415 COLL VENOUS BLD VENIPUNCTURE: CPT | Performed by: INTERNAL MEDICINE

## 2017-02-24 PROCEDURE — 84443 ASSAY THYROID STIM HORMONE: CPT | Performed by: INTERNAL MEDICINE

## 2017-02-24 PROCEDURE — 87186 SC STD MICRODIL/AGAR DIL: CPT | Performed by: EMERGENCY MEDICINE

## 2017-02-24 PROCEDURE — P9016 RBC LEUKOCYTES REDUCED: HCPCS | Performed by: INTERNAL MEDICINE

## 2017-02-24 PROCEDURE — 82270 OCCULT BLOOD FECES: CPT

## 2017-02-24 PROCEDURE — 83010 ASSAY OF HAPTOGLOBIN QUANT: CPT | Performed by: INTERNAL MEDICINE

## 2017-02-24 PROCEDURE — 83540 ASSAY OF IRON: CPT | Performed by: INTERNAL MEDICINE

## 2017-02-24 PROCEDURE — 80048 BASIC METABOLIC PNL TOTAL CA: CPT | Performed by: INTERNAL MEDICINE

## 2017-02-24 PROCEDURE — 65660000000 HC RM CCU STEPDOWN

## 2017-02-24 PROCEDURE — 82728 ASSAY OF FERRITIN: CPT | Performed by: INTERNAL MEDICINE

## 2017-02-24 PROCEDURE — 87086 URINE CULTURE/COLONY COUNT: CPT | Performed by: EMERGENCY MEDICINE

## 2017-02-24 PROCEDURE — 86920 COMPATIBILITY TEST SPIN: CPT | Performed by: INTERNAL MEDICINE

## 2017-02-24 PROCEDURE — 85027 COMPLETE CBC AUTOMATED: CPT | Performed by: INTERNAL MEDICINE

## 2017-02-24 RX ORDER — MIRTAZAPINE 15 MG/1
45 TABLET, FILM COATED ORAL
Status: DISCONTINUED | OUTPATIENT
Start: 2017-02-24 | End: 2017-03-01 | Stop reason: HOSPADM

## 2017-02-24 RX ORDER — CLONAZEPAM 0.5 MG/1
0.5 TABLET ORAL 3 TIMES DAILY
Status: DISCONTINUED | OUTPATIENT
Start: 2017-02-24 | End: 2017-03-01 | Stop reason: HOSPADM

## 2017-02-24 RX ORDER — ACETAMINOPHEN 325 MG/1
325 TABLET ORAL
Status: DISCONTINUED | OUTPATIENT
Start: 2017-02-24 | End: 2017-03-01 | Stop reason: HOSPADM

## 2017-02-24 RX ORDER — PREDNISONE 20 MG/1
40 TABLET ORAL
Status: DISCONTINUED | OUTPATIENT
Start: 2017-02-24 | End: 2017-03-01 | Stop reason: HOSPADM

## 2017-02-24 RX ORDER — SODIUM CHLORIDE 0.9 % (FLUSH) 0.9 %
5-10 SYRINGE (ML) INJECTION AS NEEDED
Status: DISCONTINUED | OUTPATIENT
Start: 2017-02-24 | End: 2017-03-01 | Stop reason: HOSPADM

## 2017-02-24 RX ORDER — DOCUSATE SODIUM 100 MG/1
100 CAPSULE, LIQUID FILLED ORAL 2 TIMES DAILY
Status: DISCONTINUED | OUTPATIENT
Start: 2017-02-24 | End: 2017-03-01 | Stop reason: HOSPADM

## 2017-02-24 RX ORDER — LANOLIN ALCOHOL/MO/W.PET/CERES
6 CREAM (GRAM) TOPICAL
Status: DISCONTINUED | OUTPATIENT
Start: 2017-02-24 | End: 2017-03-01 | Stop reason: HOSPADM

## 2017-02-24 RX ORDER — OLANZAPINE 10 MG/1
10 TABLET ORAL
Status: DISCONTINUED | OUTPATIENT
Start: 2017-02-24 | End: 2017-03-01 | Stop reason: HOSPADM

## 2017-02-24 RX ORDER — SODIUM CHLORIDE 9 MG/ML
100 INJECTION, SOLUTION INTRAVENOUS CONTINUOUS
Status: DISCONTINUED | OUTPATIENT
Start: 2017-02-24 | End: 2017-02-26

## 2017-02-24 RX ORDER — FOLIC ACID 1 MG/1
1 TABLET ORAL DAILY
Status: DISCONTINUED | OUTPATIENT
Start: 2017-02-24 | End: 2017-03-01 | Stop reason: HOSPADM

## 2017-02-24 RX ORDER — DOCUSATE SODIUM 100 MG/1
100 CAPSULE, LIQUID FILLED ORAL
Status: COMPLETED | OUTPATIENT
Start: 2017-02-24 | End: 2017-02-24

## 2017-02-24 RX ORDER — PREDNISOLONE ACETATE 10 MG/ML
1 SUSPENSION/ DROPS OPHTHALMIC 3 TIMES DAILY
Status: DISCONTINUED | OUTPATIENT
Start: 2017-02-24 | End: 2017-03-01 | Stop reason: HOSPADM

## 2017-02-24 RX ORDER — CLONAZEPAM 0.5 MG/1
0.5 TABLET ORAL
Status: COMPLETED | OUTPATIENT
Start: 2017-02-24 | End: 2017-02-24

## 2017-02-24 RX ORDER — ONDANSETRON 2 MG/ML
4 INJECTION INTRAMUSCULAR; INTRAVENOUS
Status: DISCONTINUED | OUTPATIENT
Start: 2017-02-24 | End: 2017-03-01 | Stop reason: HOSPADM

## 2017-02-24 RX ORDER — SODIUM CHLORIDE 0.9 % (FLUSH) 0.9 %
5-10 SYRINGE (ML) INJECTION EVERY 8 HOURS
Status: DISCONTINUED | OUTPATIENT
Start: 2017-02-24 | End: 2017-03-01 | Stop reason: HOSPADM

## 2017-02-24 RX ORDER — SODIUM CHLORIDE 9 MG/ML
250 INJECTION, SOLUTION INTRAVENOUS AS NEEDED
Status: DISCONTINUED | OUTPATIENT
Start: 2017-02-24 | End: 2017-03-01 | Stop reason: HOSPADM

## 2017-02-24 RX ORDER — VENLAFAXINE HYDROCHLORIDE 150 MG/1
150 CAPSULE, EXTENDED RELEASE ORAL DAILY
Status: DISCONTINUED | OUTPATIENT
Start: 2017-02-24 | End: 2017-03-01 | Stop reason: HOSPADM

## 2017-02-24 RX ORDER — HYDROCODONE BITARTRATE AND ACETAMINOPHEN 5; 325 MG/1; MG/1
1 TABLET ORAL
Status: DISCONTINUED | OUTPATIENT
Start: 2017-02-24 | End: 2017-02-27

## 2017-02-24 RX ORDER — PANTOPRAZOLE SODIUM 40 MG/1
40 TABLET, DELAYED RELEASE ORAL
Status: DISCONTINUED | OUTPATIENT
Start: 2017-02-24 | End: 2017-03-01 | Stop reason: HOSPADM

## 2017-02-24 RX ORDER — HYDROCODONE BITARTRATE AND ACETAMINOPHEN 5; 325 MG/1; MG/1
1 TABLET ORAL
Status: COMPLETED | OUTPATIENT
Start: 2017-02-24 | End: 2017-02-24

## 2017-02-24 RX ORDER — CEFUROXIME AXETIL 250 MG/1
250 TABLET ORAL DAILY
Status: DISCONTINUED | OUTPATIENT
Start: 2017-02-24 | End: 2017-03-01 | Stop reason: HOSPADM

## 2017-02-24 RX ORDER — METOPROLOL TARTRATE 25 MG/1
25 TABLET, FILM COATED ORAL 2 TIMES DAILY
Status: DISCONTINUED | OUTPATIENT
Start: 2017-02-24 | End: 2017-03-01 | Stop reason: HOSPADM

## 2017-02-24 RX ORDER — METHOTREXATE 2.5 MG/1
15 TABLET ORAL
Status: DISCONTINUED | OUTPATIENT
Start: 2017-02-26 | End: 2017-03-01 | Stop reason: HOSPADM

## 2017-02-24 RX ADMIN — SODIUM CHLORIDE 100 ML/HR: 900 INJECTION, SOLUTION INTRAVENOUS at 22:01

## 2017-02-24 RX ADMIN — METOPROLOL TARTRATE 25 MG: 25 TABLET ORAL at 09:38

## 2017-02-24 RX ADMIN — CLONAZEPAM 0.5 MG: 0.5 TABLET ORAL at 15:58

## 2017-02-24 RX ADMIN — PREDNISOLONE ACETATE 1 DROP: 10 SUSPENSION/ DROPS OPHTHALMIC at 22:01

## 2017-02-24 RX ADMIN — VENLAFAXINE HYDROCHLORIDE 150 MG: 150 CAPSULE, EXTENDED RELEASE ORAL at 13:23

## 2017-02-24 RX ADMIN — SODIUM CHLORIDE 100 ML/HR: 900 INJECTION, SOLUTION INTRAVENOUS at 06:38

## 2017-02-24 RX ADMIN — HYDROCODONE BITARTRATE AND ACETAMINOPHEN 1 TABLET: 5; 325 TABLET ORAL at 18:06

## 2017-02-24 RX ADMIN — DOCUSATE SODIUM 100 MG: 100 CAPSULE, LIQUID FILLED ORAL at 17:59

## 2017-02-24 RX ADMIN — PREDNISOLONE ACETATE 1 DROP: 10 SUSPENSION/ DROPS OPHTHALMIC at 18:01

## 2017-02-24 RX ADMIN — CEFUROXIME AXETIL 250 MG: 250 TABLET ORAL at 13:27

## 2017-02-24 RX ADMIN — PREDNISOLONE ACETATE 1 DROP: 10 SUSPENSION/ DROPS OPHTHALMIC at 09:38

## 2017-02-24 RX ADMIN — MIRTAZAPINE 45 MG: 15 TABLET, FILM COATED ORAL at 04:28

## 2017-02-24 RX ADMIN — CLONAZEPAM 0.5 MG: 0.5 TABLET ORAL at 04:27

## 2017-02-24 RX ADMIN — TRAZODONE HYDROCHLORIDE 150 MG: 100 TABLET ORAL at 04:28

## 2017-02-24 RX ADMIN — HYDROCODONE BITARTRATE AND ACETAMINOPHEN 1 TABLET: 5; 325 TABLET ORAL at 00:54

## 2017-02-24 RX ADMIN — DOCUSATE SODIUM 100 MG: 100 CAPSULE, LIQUID FILLED ORAL at 09:38

## 2017-02-24 RX ADMIN — TRAZODONE HYDROCHLORIDE 150 MG: 100 TABLET ORAL at 22:00

## 2017-02-24 RX ADMIN — Medication 10 ML: at 22:00

## 2017-02-24 RX ADMIN — HYDROCODONE BITARTRATE AND ACETAMINOPHEN 1 TABLET: 5; 325 TABLET ORAL at 09:38

## 2017-02-24 RX ADMIN — PREDNISONE 40 MG: 10 TABLET ORAL at 09:38

## 2017-02-24 RX ADMIN — OLANZAPINE 10 MG: 10 TABLET, FILM COATED ORAL at 22:00

## 2017-02-24 RX ADMIN — METOPROLOL TARTRATE 25 MG: 25 TABLET ORAL at 17:59

## 2017-02-24 RX ADMIN — DOCUSATE SODIUM 100 MG: 100 CAPSULE, LIQUID FILLED ORAL at 04:27

## 2017-02-24 RX ADMIN — PANTOPRAZOLE SODIUM 40 MG: 40 TABLET, DELAYED RELEASE ORAL at 09:38

## 2017-02-24 RX ADMIN — CLONAZEPAM 0.5 MG: 0.5 TABLET ORAL at 22:00

## 2017-02-24 RX ADMIN — MIRTAZAPINE 45 MG: 15 TABLET, FILM COATED ORAL at 22:00

## 2017-02-24 RX ADMIN — OLANZAPINE 10 MG: 10 TABLET, FILM COATED ORAL at 04:27

## 2017-02-24 RX ADMIN — Medication 10 ML: at 13:54

## 2017-02-24 RX ADMIN — FOLIC ACID 1 MG: 1 TABLET ORAL at 13:23

## 2017-02-24 RX ADMIN — CLONAZEPAM 0.5 MG: 0.5 TABLET ORAL at 09:38

## 2017-02-24 NOTE — ED NOTES
Bedside and Verbal shift change report given to Janeth Amato (oncoming nurse) by Vic Madrid (offgoing nurse). Report included the following information SBAR, Kardex, ED Summary, Intake/Output and MAR.

## 2017-02-24 NOTE — PROGRESS NOTES
Primary Nurse Jamaal Alfaro and Alfred Moerno RN performed a dual skin assessment on this patient No impairment noted  Sage score is 14

## 2017-02-24 NOTE — CONSULTS
Gastroenterology Consultation Note  Dr. Anali Huerta    NAME: Annie Kamara : 1940 MRN: [id number]   PCP: Sarina Guerrero MD   Primary GI: Pt unsure  Date/Time:  2017 7:58 AM  Subjective:   REASON FOR CONSULT:      Elaine Bernstein is a 78 y.o.  female who I was asked to see for anemia. Patient seen in the ER where she presented following a syncopal event. She is lucid and denies any headaches or focal weakness. She has been feeling weaker over the past few days. Per the chart the daughter witnessed the syncopal event but was not present for interview. Patient was found to be anemic with Hgb 7.6 down from 12.6 one month ago. She denies taking NSAIDs but does have RA and is on MTX and Humira. Was started on prednisone 2 days ago. No n/v. No melena or BRBPR. She does look at her stools and they have been normal per pt. Has had a colonoscopy and possibly EGD she recalls about 5 years ago at [facility] but no details such as results or MD.  No h/o GERD. Past Medical History:   Diagnosis Date    Baker's cyst of knee     Depression     Hypertension     RA (rheumatoid arthritis) ([address]       Past Surgical History:   Procedure Laterality Date    HX HERNIA REPAIR       Social History   Substance Use Topics    Smoking status: Former Smoker    Smokeless tobacco: Never Used    Alcohol use No      History reviewed. No pertinent family history. Allergies   Allergen Reactions    Codeine Nausea Only      Home Medications:  Prior to Admission Medications   Prescriptions Last Dose Informant Patient Reported? Taking? HYDROcodone-acetaminophen (NORCO) 5-325 mg per tablet 2017 at Unknown time  Yes Yes   Sig: Take 1 Tab by mouth every eight (8) hours as needed. OLANZapine (ZYPREXA) 10 mg tablet 2017 at Unknown time  Yes Yes   Sig: Take 10 mg by mouth nightly. Takes 15 mg at bedtime   SENNA 2017 at Unknown time  Yes Yes   Sig: Take 2 Tabs by mouth once. acetaminophen (TYLENOL) 325 mg tablet 2017 at Unknown time  Yes Yes   Sig: Take  by mouth every four (4) hours as needed for Pain. adalimumab (HUMIRA PEN) 40 mg/0.8 mL pnkt 2017 at Unknown time  No Yes   Si.8 mL by SubCUTAneous route every fourteen (14) days. adalimumab (HUMIRA) 40 mg/0.8 mL injection 2017 at Unknown time  Yes Yes   Sig: by SubCUTAneous route. Duplicate   cefUROXime (CEFTIN) 250 mg tablet 2017 at Unknown time  Yes Yes   Sig: Take 250 mg by mouth daily. clonazePAM (KLONOPIN) 0.5 mg tablet 2017 at Unknown time  Yes Yes   Sig: Take 0.5 mg by mouth three (3) times daily. docusate sodium (COLACE) 100 mg capsule 2017 at Unknown time  Yes Yes   Sig: Take 100 mg by mouth two (2) times a day. folic acid (FOLVITE) 1 mg tablet 2017 at Unknown time  Yes Yes   Sig: Take  by mouth daily. lovastatin (MEVACOR) 40 mg tablet 2017 at Unknown time  Yes Yes   Sig: Take 40 mg by mouth daily. methotrexate (RHEUMATREX) 2.5 mg tablet 2017 at Unknown time  Yes Yes   Sig: Take 15 mg by mouth every . Takes 6 tabs   metoprolol (LOPRESSOR) 25 mg tablet 2017 at Unknown time  Yes Yes   Sig: Take 25 mg by mouth two (2) times a day. mirtazapine (REMERON) 45 mg tablet 2017 at Unknown time  Yes Yes   Sig: Take 45 mg by mouth nightly. polyvinyl alcohol (LIQUIFILM TEARS) 1.4 % ophthalmic solution 2017 at Unknown time  Yes Yes   Sig: Administer 1 Drop to both eyes as needed. predniSONE (DELTASONE) 5 mg tablet   Yes No   predniSONE (DELTASONE) 5 mg tablet 2017 at Unknown time  No Yes   Simg x 3 days, 15mg x 3 days, 10mg x 3 days, 5mg x 3 days   Patient taking differently: 20mg x 3 days, 15mg x 3 days, 10mg x 3 days, 5mg x 3 days Not taking   prednisoLONE acetate (PRED FORTE) 1 % ophthalmic suspension 2017 at Unknown time  Yes Yes   Sig: Administer 1 Drop to left eye three (3) times daily.    traMADol (ULTRAM) 50 mg tablet 2017 at Unknown time  Yes Yes   Sig: Take 50 mg by mouth. Takes 4 times a day   traZODone (DESYREL) 150 mg tablet 2/24/2017 at Unknown time  Yes Yes   Sig: Take 150 mg by mouth nightly. venlafaxine-SR (EFFEXOR-XR) 150 mg capsule 2/24/2017 at Unknown time  Yes Yes   Sig: Take  by mouth daily.       Facility-Administered Medications: None     Hospital medications:  Current Facility-Administered Medications   Medication Dose Route Frequency    acetaminophen (TYLENOL) tablet 325 mg  325 mg Oral Q4H PRN    cefUROXime (CEFTIN) tablet 250 mg  250 mg Oral DAILY    clonazePAM (KlonoPIN) tablet 0.5 mg  0.5 mg Oral TID    docusate sodium (COLACE) capsule 100 mg  100 mg Oral BID    folic acid (FOLVITE) tablet 1 mg  1 mg Oral DAILY    HYDROcodone-acetaminophen (NORCO) 5-325 mg per tablet 1 Tab  1 Tab Oral Q6H PRN    [START ON 2/26/2017] methotrexate (RHEUMATREX) tablet 15 mg  15 mg Oral every Sunday    metoprolol tartrate (LOPRESSOR) tablet 25 mg  25 mg Oral BID    mirtazapine (REMERON) tablet 45 mg  45 mg Oral QHS    OLANZapine (ZyPREXA) tablet 10 mg  10 mg Oral QHS    artificial tears (dextran 70-hypromellose) (NATURAL BALANCE) 0.1-0.3 % ophthalmic solution 1 Drop  1 Drop Both Eyes PRN    prednisoLONE acetate (PRED FORTE) 1 % ophthalmic suspension 1 Drop  1 Drop Left Eye TID    traZODone (DESYREL) tablet 150 mg  150 mg Oral QHS    venlafaxine-SR (EFFEXOR-XR) capsule 150 mg  150 mg Oral DAILY    predniSONE (DELTASONE) tablet 40 mg  40 mg Oral DAILY WITH BREAKFAST    0.9% sodium chloride infusion  100 mL/hr IntraVENous CONTINUOUS    sodium chloride (NS) flush 5-10 mL  5-10 mL IntraVENous Q8H    sodium chloride (NS) flush 5-10 mL  5-10 mL IntraVENous PRN    ondansetron (ZOFRAN) injection 4 mg  4 mg IntraVENous Q4H PRN    pantoprazole (PROTONIX) tablet 40 mg  40 mg Oral ACB    0.9% sodium chloride infusion 250 mL  250 mL IntraVENous PRN     Current Outpatient Prescriptions   Medication Sig    traZODone (DESYREL) 150 mg tablet Take 150 mg by mouth nightly.  venlafaxine-SR (EFFEXOR-XR) 150 mg capsule Take  by mouth daily.  folic acid (FOLVITE) 1 mg tablet Take  by mouth daily.  methotrexate (RHEUMATREX) 2.5 mg tablet Take 15 mg by mouth every Sunday. Takes 6 tabs    docusate sodium (COLACE) 100 mg capsule Take 100 mg by mouth two (2) times a day.  cefUROXime (CEFTIN) 250 mg tablet Take 250 mg by mouth daily.  polyvinyl alcohol (LIQUIFILM TEARS) 1.4 % ophthalmic solution Administer 1 Drop to both eyes as needed.  acetaminophen (TYLENOL) 325 mg tablet Take  by mouth every four (4) hours as needed for Pain.  SENNA Take 2 Tabs by mouth once.  adalimumab (HUMIRA PEN) 40 mg/0.8 mL pnkt 0.8 mL by SubCUTAneous route every fourteen (14) days.  predniSONE (DELTASONE) 5 mg tablet 20mg x 3 days, 15mg x 3 days, 10mg x 3 days, 5mg x 3 days (Patient taking differently: 20mg x 3 days, 15mg x 3 days, 10mg x 3 days, 5mg x 3 days Not taking)    traMADol (ULTRAM) 50 mg tablet Take 50 mg by mouth. Takes 4 times a day    adalimumab (HUMIRA) 40 mg/0.8 mL injection by SubCUTAneous route. Duplicate    clonazePAM (KLONOPIN) 0.5 mg tablet Take 0.5 mg by mouth three (3) times daily.  HYDROcodone-acetaminophen (NORCO) 5-325 mg per tablet Take 1 Tab by mouth every eight (8) hours as needed.  lovastatin (MEVACOR) 40 mg tablet Take 40 mg by mouth daily.  metoprolol (LOPRESSOR) 25 mg tablet Take 25 mg by mouth two (2) times a day.  mirtazapine (REMERON) 45 mg tablet Take 45 mg by mouth nightly.  OLANZapine (ZYPREXA) 10 mg tablet Take 10 mg by mouth nightly. Takes 15 mg at bedtime    prednisoLONE acetate (PRED FORTE) 1 % ophthalmic suspension Administer 1 Drop to left eye three (3) times daily.     predniSONE (DELTASONE) 5 mg tablet      REVIEW OF SYSTEMS:       Review of Systems - History obtained from chart review and the patient  General ROS: positive for  - fatigue  Psychological ROS: positive for - depression  Hematological and Lymphatic ROS: negative for - bleeding problems or blood clots  Respiratory ROS: positive for - shortness of breath  Cardiovascular ROS: negative for - chest pain or palpitations  Gastrointestinal ROS: no abdominal pain, change in bowel habits, or black or bloody stools  Genito-Urinary ROS: no dysuria, trouble voiding, or hematuria  Musculoskeletal ROS: positive for - joint pain and joint stiffness  Neurological ROS: positive for - dizziness  Dermatological ROS: negative    Objective:   VITALS:    Visit Vitals    /57 (BP Patient Position: At rest)    Pulse (!) 111    Temp 99 °F (37.2 °C)    Resp 20    Ht 5' 2\" (1.575 m)    Wt 79.8 kg (176 lb)    SpO2 98%    BMI 32.19 kg/m2     Temp (24hrs), Av °F (37.2 °C), Min:99 °F (37.2 °C), Max:99 °F (37.2 °C)    PHYSICAL EXAM:   General:    Alert, cooperative, Pale elderly WF in no distress, appears stated age. Head:   Normocephalic, without obvious abnormality, atraumatic. Eyes:   Conjunctivae clear, anicteric sclerae. Pupils are equal  Nose:  Nares normal. No drainage or sinus tenderness. Throat:    Lips, mucosa, and tongue normal.  No Thrush  Neck:  Supple, symmetrical,  no adenopathy, thyroid: non tender  Back:    Symmetric,  No CVA tenderness. Lungs:   CTA bilaterally. No wheezing/rhonchi/rales. Heart:   Regular rate and rhythm,  no murmur, rub or gallop. Abdomen:   Soft, non-tender. Not distended. Bowel sounds normal. No masses. No hepatosplenomegaly. No shifting dullness. Rectal:  Deferred   Extremities: No cyanosis. No edema. No clubbing  Skin:     Texture, turgor normal. No rashes/lesions/jaundice  Lymph: Cervical, supraclavicular normal.  Psych:  Good insight. Not depressed. Not anxious or agitated. Neurologic: No facial asymmetry. No aphasia or slurred speech. Normal strength, A/O X 3.      LAB DATA REVIEWED:    Lab Results   Component Value Date/Time    WBC 4.7 2017 04:30 AM    HGB 7.6 2017 04:30 AM    HCT 22.9 02/24/2017 04:30 AM    PLATELET 526 69/45/7875 04:30 AM    MCV 95.0 02/24/2017 04:30 AM     Lab Results   Component Value Date/Time    ALT (SGPT) 33 02/23/2017 10:40 PM    AST (SGOT) 29 02/23/2017 10:40 PM    Alk. phosphatase 90 02/23/2017 10:40 PM    Bilirubin, total 0.2 02/23/2017 10:40 PM     Lab Results   Component Value Date/Time    Sodium 137 02/24/2017 04:30 AM    Potassium 3.3 02/24/2017 04:30 AM    Chloride 102 02/24/2017 04:30 AM    CO2 26 02/24/2017 04:30 AM    Anion gap 9 02/24/2017 04:30 AM    Glucose 119 02/24/2017 04:30 AM    BUN 15 02/24/2017 04:30 AM    Creatinine 0.70 02/24/2017 04:30 AM    BUN/Creatinine ratio 21 02/24/2017 04:30 AM    GFR est AA >60 02/24/2017 04:30 AM    GFR est non-AA >60 02/24/2017 04:30 AM    Calcium 7.3 02/24/2017 04:30 AM     No results found for: LPSE  Lab Results   Component Value Date/Time    INR 1.1 02/23/2017 10:40 PM    Prothrombin time 10.9 02/23/2017 10:40 PM     Lab Results   Component Value Date/Time    Iron 16 02/24/2017 04:30 AM    TIBC 185 02/24/2017 04:30 AM    Iron % saturation 9 02/24/2017 04:30 AM    Ferritin 115 02/24/2017 04:30 AM         Impression:  Syncope  Iron Deficiency anemia  Rheumatoid arthritis on Humira and Methotrexate    Plan:  Patient with JANE and no obvious GI blood loss based on history but with acute drop over a month possible occult GI bleeding from erosive gastritis, PUD, AVMs or other sources possible and will require GI endoscopic evaluation. Patient without any overt GI bleeding and therefore will likely plan for EGD/COLONOSCOPY next week after transfusion today. If overt bleeding develops with melena or BRBPR then urgent procedures to be scheduled. Plan d/w pt and agrees.   Will have on call see in AM.       ___________________________________________________  Care Plan discussed with:    [x]    Patient   []    Family   [x]    Nursing   []    Attending ___________________________________________________  GI: Sara Lees MD

## 2017-02-24 NOTE — ED PROVIDER NOTES
HPI Comments: June Verlin Koyanagi is a 78 y.o. female, pmhx significant for RA, depression, HTN, who presents via EMS to the ED c/o sudden onset buttock pain s/p fall with associated R frontal hematoma and witness syncope with associated lightheadedness x today. Per pt's daughter, she is currently at a nursing facility. Pt sat on her bed and slipped off due to a slippery blanket, landing on her buttocks and hitting her R frontal head on a nearby fan. Pt's daughter spoke with the pt on the phone and noted that the pt sounded like she was in pain, so she came to the pt's nursing home to check on her. Pt reported that her buttocks were hurting but after care from her daughter, she was able to get comfortable in bed. She needed to use the restroom, so her daughter assisted her to her walker, but as she walked a few steps, she reported that she felt lightheaded and as though she was going to pass out. Her eyes rolled back into her head and she lost consciousness, but her daughter was able to assist her to the ground with no additional injuries. Pt was unconscious for a few seconds and the nursing staff called EMS. Per pt's son, the pt was recently put on 40mg of prednisone for her rheumatoid arthritis, which has been helping, due to her hx of drug abuse. In the past, if pt stops taking all pain medication, she would have seizures, but pt's daughter does not believe she had a seizure tonight. Of note, pt is on preventative abx due to recurrent UTI's. Pt reports that she has not slept in 3 days, which is why she believes she passed out today. Pt specifically marion CP. PCP: Mahamed Zhang MD      Social Hx: -tobacco (former), -EtOH, -Illicit Drugs  FHx: no pertinent family hx   Medication Allergies: codeine      There are no other complaints, changes, or physical findings at this time. The history is provided by the patient, the EMS personnel and a relative.         Past Medical History:   Diagnosis Date    Baker's cyst of knee     Depression     Hypertension     RA (rheumatoid [address]) ([facility] [address]        Past Surgical History:   Procedure Laterality Date    HX HERNIA REPAIR           History reviewed. No pertinent family history. Social History     Social History    Marital status:      Spouse name: N/A    Number of children: N/A    Years of education: N/A     Occupational History    Not on file. Social History Main Topics    Smoking status: Former Smoker    Smokeless tobacco: Never Used    Alcohol use No    Drug use: No    Sexual activity: No     Other Topics Concern    Not on file     Social History Narrative         ALLERGIES: Codeine    Review of Systems   Constitutional: Negative for activity change, appetite change, fatigue and fever. HENT: Negative. Negative for congestion, rhinorrhea and sore throat. Respiratory: Negative. Negative for cough, shortness of breath and wheezing. Cardiovascular: Negative. Negative for chest pain and leg swelling. Gastrointestinal: Negative. Negative for abdominal distention, abdominal pain, constipation, diarrhea, nausea and vomiting. Endocrine: Negative. Genitourinary: Negative for difficulty urinating, dysuria, menstrual problem, vaginal bleeding and vaginal discharge. Musculoskeletal: Positive for myalgias (buttock pain). Negative for arthralgias and joint swelling. Skin: Negative. Negative for rash.        + R frontal hematoma    Neurological: Positive for syncope and light-headedness. Negative for dizziness, weakness and headaches. Psychiatric/Behavioral: Negative. All other systems reviewed and are negative. Patient Vitals for the past 12 hrs:   Temp Pulse Resp BP SpO2   02/23/17 2310 - (!) 120 14 101/42 -   02/23/17 2144 - (!) 122 26 91/75 (!) 84 %   02/23/17 2143 99 °F (37.2 °C) (!) 126 20 91/75 94 %         Physical Exam   Constitutional: She is oriented to person, place, and time. She appears well-developed and well-nourished. HENT:   Small R frontal hematoma    Eyes: EOM are normal.   Cardiovascular: Regular rhythm, normal heart sounds and intact distal pulses. Exam reveals no gallop and no friction rub. No murmur heard. Tachycardic, 110's - 120's    Pulmonary/Chest: Effort normal and breath sounds normal. No respiratory distress. She has no wheezes. She has no rales. She exhibits no tenderness. Occasional cough   Abdominal: Soft. Bowel sounds are normal. She exhibits no distension and no mass. There is no tenderness. There is no rebound and no guarding. Musculoskeletal: Normal range of motion. She exhibits no edema or tenderness. Severe hand/finger deformity secondary to known arthritis    Neurological: She is oriented to person, place, and time. Skin: Skin is warm. Psychiatric: She has a normal mood and affect. Nursing note and vitals reviewed. MDM  Number of Diagnoses or Management Options  Orthostatic lightheadedness:   Other iron deficiency anemia:   Pain of both hip joints:   Syncope and collapse:   Diagnosis management comments: DDx: fall, orthostatic hypotension, syncope, dehydration, electrolyte abnormality, UTI, closed head injury, hematoma, polypharmacy in the setting of known arthritis        Amount and/or Complexity of Data Reviewed  Clinical lab tests: reviewed and ordered  Tests in the radiology section of CPT®: ordered and reviewed  Tests in the medicine section of CPT®: reviewed and ordered  Obtain history from someone other than the patient: yes (EMS, daughter, son)  Review and summarize past medical records: yes  Discuss the patient with other providers: yes (Hospitalist )  Independent visualization of images, tracings, or specimens: yes    Critical Care  Total time providing critical care: 30-74 minutes        Procedures   EKG interpretation: (Preliminary) 2231  Rhythm: sinus tachycardia; and regular .  Rate (approx.): 111 bpm; Axis: normal; WI interval: normal; QRS interval: normal ; ST/T wave: normal  Written by Bobbymijosé miguel Shoulders ED Scribe as dictated by Bella Turcios MD    11:15 PM  Pt was unable to tolerate standing, so she will be admitted. Written by Bobbymijosé miguel Shoulders ED Scribe as dictated by Bella Turcios MD    CONSULT NOTE:   11:35 PM  Bella Turcios MD spoke with Dr. Maria Del Rosario Wick,   Specialty: Hospitalist  Discussed pt's hx, disposition, and available diagnostic and imaging results. Reviewed care plans. Consultant will evaluate pt for admission. Written by Jas Rodriguez, ED Scribe, as dictated by Bella Turcios MD.    CRITICAL CARE NOTE :    11:46 PM      IMPENDING DETERIORATION -Cardiovascular    ASSOCIATED RISK FACTORS - Hypotension, Metabolic changes and Dehydration    MANAGEMENT- Bedside Assessment and Supervision of Care    INTERPRETATION -  Xrays, ECG and Blood Pressure    INTERVENTIONS - hemodynamic mngmt    CASE REVIEW - Hospitalist, Nursing and Family    TREATMENT RESPONSE -Improved    PERFORMED BY - Self        NOTES   :      I have spent >100 minutes of critical care time involved in lab review, consultations with specialist, family decision- making, bedside attention and documentation. During this entire length of time I was immediately available to the patient .     Bella Turcios MD    LABORATORY TESTS:  Recent Results (from the past 12 hour(s))   EKG, 12 LEAD, INITIAL    Collection Time: 02/23/17 10:31 PM   Result Value Ref Range    Ventricular Rate 111 BPM    Atrial Rate 111 BPM    P-R Interval 156 ms    QRS Duration 82 ms    Q-T Interval 328 ms    QTC Calculation (Bezet) 446 ms    Calculated P Axis 63 degrees    Calculated R Axis -25 degrees    Calculated T Axis 36 degrees    Diagnosis       Sinus tachycardia  Otherwise normal ECG  No previous ECGs available     CBC WITH AUTOMATED DIFF    Collection Time: 02/23/17 10:40 PM   Result Value Ref Range    WBC 8.1 3.6 - 11.0 K/uL    RBC 2.81 (L) 3.80 - 5.20 M/uL    HGB 9.0 (L) 11.5 - 16.0 g/dL    HCT 26.6 (L) 35.0 - 47.0 %    MCV 94.7 80.0 - 99.0 FL    MCH 32.0 26.0 - 34.0 PG    MCHC 33.8 30.0 - 36.5 g/dL    RDW 13.9 11.5 - 14.5 %    PLATELET 620 874 - 840 K/uL    NEUTROPHILS 84 %    BAND NEUTROPHILS 5 %    LYMPHOCYTES 4 %    MONOCYTES 7 %    EOSINOPHILS 0 %    BASOPHILS 0 %    ABS. NEUTROPHILS 7.2 K/UL    ABS. LYMPHOCYTES 0.3 K/UL    ABS. MONOCYTES 0.6 K/UL    ABS. EOSINOPHILS 0.0 K/UL    ABS. BASOPHILS 0.0 K/UL    RBC COMMENTS NORMOCYTIC, NORMOCHROMIC      WBC COMMENTS TOXIC GRANULATION     METABOLIC PANEL, COMPREHENSIVE    Collection Time: 02/23/17 10:40 PM   Result Value Ref Range    Sodium 136 136 - 145 mmol/L    Potassium 3.5 3.5 - 5.1 mmol/L    Chloride 99 97 - 108 mmol/L    CO2 27 21 - 32 mmol/L    Anion gap 10 5 - 15 mmol/L    Glucose 125 (H) 65 - 100 mg/dL    BUN 14 6 - 20 MG/DL    Creatinine 1.11 (H) 0.55 - 1.02 MG/DL    BUN/Creatinine ratio 13 12 - 20      GFR est AA 58 (L) >60 ml/min/1.73m2    GFR est non-AA 48 (L) >60 ml/min/1.73m2    Calcium 8.0 (L) 8.5 - 10.1 MG/DL    Bilirubin, total 0.2 0.2 - 1.0 MG/DL    ALT (SGPT) 33 12 - 78 U/L    AST (SGOT) 29 15 - 37 U/L    Alk. phosphatase 90 45 - 117 U/L    Protein, total 6.4 6.4 - 8.2 g/dL    Albumin 2.9 (L) 3.5 - 5.0 g/dL    Globulin 3.5 2.0 - 4.0 g/dL    A-G Ratio 0.8 (L) 1.1 - 2.2     TROPONIN I    Collection Time: 02/23/17 10:40 PM   Result Value Ref Range    Troponin-I, Qt. <0.04 <0.05 ng/mL   CK W/ REFLX CKMB    Collection Time: 02/23/17 10:40 PM   Result Value Ref Range    CK 31 26 - 192 U/L   PROTHROMBIN TIME + INR    Collection Time: 02/23/17 10:40 PM   Result Value Ref Range    INR 1.1 0.9 - 1.1      Prothrombin time 10.9 9.0 - 11.1 sec       IMAGING RESULTS:  EXAM: XR HIPS BI W AP PELV     INDICATION: Multiple falls today while at [employer]. Head  width just syncopal episode while at home today. Has right buttock and left hip  pain. .     COMPARISON: None.     FINDINGS: An AP view of the pelvis and frogleg lateral views of both hips  demonstrate mild diffuse osteopenia. No acute fracture or dislocation is shown. Mild right SI joint osteoarthrosis and mild degenerative changes in the lumbar  spine is shown. Numerous calcified injection granulomata of the buttocks  bilaterally are demonstrated. .     IMPRESSION  IMPRESSION: No acute fracture or dislocation demonstrated. MEDICATIONS GIVEN:  Medications   sodium chloride 0.9 % bolus infusion 1,000 mL (1,000 mL IntraVENous New Bag 2/23/17 3166)       IMPRESSION:  1. Orthostatic lightheadedness    2. Other iron deficiency anemia    3. Syncope and collapse    4. Pain of both hip joints        PLAN: admit to hospitalist  11:37 PM  Patient is being admitted to the hospital by Dr. Bess Khan. The results of their tests and reasons for their admission have been discussed with them and/or available family. They convey agreement and understanding for the need to be admitted and for their admission diagnosis. Written by CHACHA Hall, as dictated by Bella Turcios MD.    This note is prepared by Bobbymijosé miguel Rodriguez acting as scribe for MD Bella Kirk MD : The scribe's documentation has been prepared under my direction and personally reviewed by me in its entirety. I confirm that the note above accurately reflects all work, treatment, procedures, and medical decision making performed by me.

## 2017-02-24 NOTE — PROGRESS NOTES
Hospitalist Progress Note    NAME: Estelle Arenas   :  1940   MRN:  [id number]       Interim Hospital Summary: 78 y.o. female whom presented on 2017 with      Assessment / Plan:  Iron Deficiency Anemia  Syncope  - orthostatic hypotension likely from anemia.    -continue IVF  -type and screen  -will give one unit prbc, PPI  -trend H/H  -discussed case with Dr Janet Hannah  RA  -continue prednisone 40 mg daily  -humira twice a month, methotrexate weekly     Recurrent UTI  -will continue ceftin at ppx dose  -f/u urine culture, UA with 5-10 WBC 2+bacteria, patient asymptomatic           Code Status: DNR - discussed with patient at bedside  Surrogate Decision Maker: daughter     DVT Prophylaxis: SCD  GI Prophylaxis: not indicated     Baseline: julio emanuel currently, uses walker             Subjective:     Chief Complaint / Reason for Physician Visit  No acute complaints. Discussed with RN events overnight. Review of Systems:  Symptom Y/N Comments  Symptom Y/N Comments   Fever/Chills n   Chest Pain n    Poor Appetite n   Edema n    Cough n   Abdominal Pain n    Sputum n   Joint Pain n    SOB/MADERA    Pruritis/Rash     Nausea/vomit    Tolerating PT/OT     Diarrhea    Tolerating Diet     Constipation    Other       Could NOT obtain due to:      Objective:     VITALS:   Last 24hrs VS reviewed since prior progress note.  Most recent are:  Patient Vitals for the past 24 hrs:   Temp Pulse Resp BP SpO2   17 1215 99.4 °F (37.4 °C) 96 17 100/52 97 %   17 1154 98.7 °F (37.1 °C) (!) 102 19 101/70 97 %   17 1138 99 °F (37.2 °C) 98 15 100/42 99 %   17 1100 - 94 16 94/54 99 %   17 0930 - (!) 123 19 123/57 96 %   17 0900 - (!) 130 22 114/69 94 %   17 0836 - - - 124/45 -   17 0800 - (!) 126 19 121/68 94 %   17 0730 - (!) 105 17 107/62 -   17 0700 - (!) 111 20 121/57 98 %   17 0601 - (!) 104 16 - 98 %   17 0600 - (!) 105 19 109/63 -   17 0545 - (!) 106 18 103/75 97 %   02/24/17 0430 - (!) 108 13 108/54 92 %   02/24/17 0215 - (!) 114 18 123/88 98 %   02/24/17 0200 - (!) 115 20 132/51 96 %   02/24/17 0145 - (!) 104 19 119/51 92 %   02/24/17 0130 - (!) 104 21 116/58 96 %   02/24/17 0115 - (!) 106 17 116/51 99 %   02/24/17 0100 - (!) 106 17 114/46 97 %   02/24/17 0045 - (!) 110 14 114/46 94 %   02/24/17 0030 - (!) 110 12 117/53 96 %   02/24/17 0015 - (!) 110 18 110/58 100 %   02/23/17 2345 - (!) 113 25 106/50 95 %   02/23/17 2310 - (!) 120 14 101/42 -   02/23/17 2144 - (!) 122 26 91/75 (!) 84 %   02/23/17 2143 99 °F (37.2 °C) (!) 126 20 91/75 94 %     No intake or output data in the 24 hours ending 02/24/17 1238     PHYSICAL EXAM:  General: WD, WN. Alert, cooperative, no acute distress    EENT:  EOMI. Anicteric sclerae. MMM  Resp:  CTA bilaterally, no wheezing or rales. No accessory muscle use  CV:  Regular  rhythm,  No edema  GI:  Soft, Non distended, Non tender.  +Bowel sounds  Neurologic:  Alert and oriented X 3, normal speech,   Psych:   Good insight. Not anxious nor agitated  Skin:  No rashes. No jaundice    Reviewed most current lab test results and cultures  YES  Reviewed most current radiology test results   YES  Review and summation of old records today    NO  Reviewed patient's current orders and MAR    YES  PMH/SH reviewed - no change compared to H&P  ________________________________________________________________________  Care Plan discussed with:    Comments   Patient x    Family      RN x    Care Manager     Consultant                        Multidiciplinary team rounds were held today with , nursing, pharmacist and clinical coordinator. Patient's plan of care was discussed; medications were reviewed and discharge planning was addressed.      ________________________________________________________________________  Total NON critical care TIME:  45   Minutes  Total CRITICAL CARE TIME Spent:   Minutes non procedure based Comments   >50% of visit spent in counseling and coordination of care     ________________________________________________________________________  Rufino Orozco MD     Procedures: see electronic medical records for all procedures/Xrays and details which were not copied into this note but were reviewed prior to creation of Plan. LABS:  I reviewed today's most current labs and imaging studies.   Pertinent labs include:  Recent Labs      02/24/17   0430  02/23/17   2240   WBC  4.7  8.1   HGB  7.6*  9.0*   HCT  22.9*  26.6*   PLT  176  230     Recent Labs      02/24/17   0430  02/23/17   2240   NA  137  136   K  3.3*  3.5   CL  102  99   CO2  26  27   GLU  119*  125*   BUN  15  14   CREA  0.70  1.11*   CA  7.3*  8.0*   ALB   --   2.9*   TBILI   --   0.2   SGOT   --   29   ALT   --   33   INR   --   1.1       Signed: Rufino Orozco MD

## 2017-02-24 NOTE — ED NOTES
Pt used bedpan, changed sheets, and put on new mesh panties, no sign or symptoms of pain or discomfort, VS WNL, frequent checks, bed in lowest position, side rails up x2, call bell within reach, will continue to monitor.

## 2017-02-24 NOTE — ED NOTES
Bedside and Verbal shift change report given to Osito Sargent RN (oncoming nurse) by Darrel Almaguer RN (offgoing nurse). Report included the following information SBAR, Kardex, ED Summary, [facility] and Recent Results.

## 2017-02-24 NOTE — ED NOTES
Assumed care of pt from EMS at this time, report received. Pt arrives s/p \"multiple falls\" today while at nursing/rehab center. Per EMS/patient/daughter, pt has had multiple falls over the past few weeks. Pt uses walker to ambulate. Per daughter, pt had witnessed syncopal episode while at home today. Daughter states she was able to bring pt to the floor without pt hitting head-pt does not recall the event. Pt with current R \"butt pain\" as well as L hip pain; pt states hx of sciatica in both legs. Pt denies any dizziness, lightheadedness at this time. Pt resting comfortably on the stretcher in a position of comfort.  Pt in no acute distress at this time.  Call bell within reach.  Side rails x 2.  Cardiac monitor x 3.  Stretcher locked in the lowest position.  Pt aware of plan to await for MD/PA-C/NP assessment, and pt/family verbalizes understanding.  Will continue to monitor hip pain.

## 2017-02-24 NOTE — H&P
Hospitalist Admission Note    NAME: Noe Clark   :  1940   MRN:  [id number]     Date/Time:  2017 3:00 AM    Patient PCP: Jacy Jaramillo MD  ________________________________________________________________________    My assessment of this patient's clinical condition and my plan of care is as follows. Assessment / Plan:  Syncope  -symptoms sound orthostatic as associated with standing from seated position, consider related to new anemia  -will given IV fluids  -continue telemetry monitoring to eval for arrhythmia   -ordered echo    Anemia, normocytic  -hgb now 9 when only one month ago was 12  -no signs of GI bleeding  -will check iron profile, B12, reticulocyte count, fecal occult blood  -consider GI evaluation  -will start PPI ppx while on steroids    RA  -continue prednisone 40 mg daily  -humira twice a month, methotrexate weekly    Recurrent UTI  -will continue ceftin at ppx dose  -f/u urine culture, UA with 5-10 WBC 2+bacteria, patient asymptomatic        Code Status: DNR - discussed with patient at bedside  Surrogate Decision Maker: daughter    DVT Prophylaxis: SCD  GI Prophylaxis: not indicated    Baseline: staying adelfo currently, uses walker        Subjective:   CHIEF COMPLAINT: syncope    HISTORY OF PRESENT ILLNESS:     Elaine Suarez is a 78 y.o.  female with history of rheumatoid arthritis, depression who presents after syncopal episode. Patient was getting up out of bed when she felt lightheaded and then passed out. Daughter witnessed event and it lasted a few seconds. Patient had no confusion after. Patient has been eating and drinking well. Has had a cold for the past few days. Only new medication is prednisone for last 2 days for her pain. Denies any bleeding or dark stools. Has history of anemia, has had colonoscopy and EGD per patient and denies any findings. We were asked to admit for work up and evaluation of the above problems.      Past Medical History:   Diagnosis Date    Baker's cyst of knee     Depression     Hypertension     RA (rheumatoid arthritis) ([address]         Past Surgical History:   Procedure Laterality Date    HX HERNIA REPAIR         Social History   Substance Use Topics    Smoking status: Former Smoker    Smokeless tobacco: Never Used    Alcohol use No        No family history of syncope, neuro disorders    Allergies   Allergen Reactions    Codeine Nausea Only        Prior to Admission medications    Medication Sig Start Date End Date Taking? Authorizing Provider   traZODone (DESYREL) 150 mg tablet Take 150 mg by mouth nightly. Yes Historical Provider   venlafaxine-SR [facility] 150 mg capsule Take  by mouth daily. Yes Historical Provider   folic acid (FOLVITE) 1 mg tablet Take  by mouth daily. Yes Historical Provider   methotrexate (RHEUMATREX) 2.5 mg tablet Take 15 mg by mouth every Sunday. Takes 6 tabs   Yes Historical Provider   docusate sodium (COLACE) 100 mg capsule Take 100 mg by mouth two (2) times a day. Yes Historical Provider   cefUROXime (CEFTIN) 250 mg tablet Take 250 mg by mouth daily. Yes Historical Provider   polyvinyl alcohol (LIQUIFILM TEARS) 1.4 % ophthalmic solution Administer 1 Drop to both eyes as needed. Yes Historical Provider   acetaminophen (TYLENOL) 325 mg tablet Take  by mouth every four (4) hours as needed for Pain. Yes Historical Provider   SENNA Take 2 Tabs by mouth once. Yes Historical Provider   adalimumab (HUMIRA PEN) 40 mg/0.8 mL pnkt 0.8 mL by SubCUTAneous route every fourteen (14) days. 1/10/17  Yes Reta Morrison MD   predniSONE (DELTASONE) 5 mg tablet 20mg x 3 days, 15mg x 3 days, 10mg x 3 days, 5mg x 3 days  Patient taking differently: 20mg x 3 days, 15mg x 3 days, 10mg x 3 days, 5mg x 3 days Not taking 3/23/16  Yes Reta Morrison MD   traMADol (ULTRAM) 50 mg tablet Take 50 mg by mouth.  Takes 4 times a day 12/2/15  Yes Historical Provider   adalimumab (HUMIRA) 40 mg/0.8 mL injection by SubCUTAneous route. Duplicate   Yes Historical Provider   clonazePAM (KLONOPIN) 0.5 mg tablet Take 0.5 mg by mouth three (3) times daily. 4/28/15  Yes Historical Provider   HYDROcodone-acetaminophen (NORCO) 5-325 mg per tablet Take 1 Tab by mouth every eight (8) hours as needed. 4/18/15  Yes Historical Provider   lovastatin (MEVACOR) 40 mg tablet Take 40 mg by mouth daily. 4/28/15  Yes Historical Provider   metoprolol (LOPRESSOR) 25 mg tablet Take 25 mg by mouth two (2) times a day. 4/13/15  Yes Historical Provider   mirtazapine (REMERON) 45 mg tablet Take 45 mg by mouth nightly. 4/20/15  Yes Historical Provider   OLANZapine (ZYPREXA) 10 mg tablet Take 10 mg by mouth nightly. Takes 15 mg at bedtime 3/15/15  Yes Historical Provider   prednisoLONE acetate (PRED FORTE) 1 % ophthalmic suspension Administer 1 Drop to left eye three (3) times daily.  4/27/15  Yes Historical Provider   predniSONE (DELTASONE) 5 mg tablet  5/6/15   Historical Provider       REVIEW OF SYSTEMS:       Total of 12 systems reviewed as follows:         General: no fever, no chills, no sweats, no generalized weakness, no weight loss, no weight gain, no loss of appetite   Eyes: no blurred vision, no eye pain, no loss of vision, no double vision  ENT: no rhinorrhea, no pharyngitis   Respiratory: + cough, no sputum production, no SOB, no MADERA, no wheezing, no pleuritic pain   Cardiology: no chest pain, no palpitations, no orthopnea, no PND, no edema, no syncope   Gastrointestinal: no abdominal pain, no N/V, no diarrhea, no dysphagia, no constipation, no bleeding   Genitourinary: no frequency, no urgency, no dysuria, no hematuria, no incontinence   Muskuloskeletal : + arthralgia, no myalgia, + back pain  Hematology: no easy bruising, no nose or gum bleeding, no lymphadenopathy   Dermatological: no rash, no ulceration, no pruritis, no color change / jaundice  Endocrine: no hot flashes, no polydipsia   Neurological: no headache, no dizziness, no confusion, no focal weakness - uses walker, no paresthesia, no speech difficulties, no memory loss, + gait difficulty chronic uses walker  Psychological: no anxiety, no depression, no agitation      Objective:   VITALS:    Patient Vitals for the past 12 hrs:   Temp Pulse Resp BP SpO2   02/24/17 0215 - (!) 114 18 123/88 98 %   02/24/17 0200 - (!) 115 20 132/51 96 %   02/24/17 0145 - (!) 104 19 119/51 92 %   02/24/17 0130 - (!) 104 21 116/58 96 %   02/24/17 0115 - (!) 106 17 116/51 99 %   02/24/17 0100 - (!) 106 17 114/46 97 %   02/24/17 0045 - (!) 110 14 114/46 94 %   02/24/17 0030 - (!) 110 12 117/53 96 %   02/24/17 0015 - (!) 110 18 110/58 100 %   02/23/17 2345 - (!) 113 25 106/50 95 %   02/23/17 2310 - (!) 120 14 101/42 -   02/23/17 2144 - (!) 122 26 91/75 (!) 84 %   02/23/17 2143 99 °F (37.2 °C) (!) 126 20 91/75 94 %         PHYSICAL EXAM:    General:    Alert, cooperative, no distress, appears stated age. HEENT: R frontal hematoma, anicteric sclerae, pink conjunctivae     No oral ulcers, oral mucosa moist, throat clear, dentition fair  Neck:  Supple, symmetrical  Lungs:   Clear to auscultation bilaterally, no wheezing, rhonchi, or rales. Chest wall:  No tenderness, no accessory muscle use. Heart:   Regular rhythm, no murmur, no edema  Abdomen:   Soft, non-tender, not distended, bowel sounds normal  Extremities: No cyanosis, +finger joint deformities, warm, well perfused, radial pulse 2+  Skin:     Not pale, not jaundiced, no rashes   Psych:  Good insight, not depressed, not anxious or agitated.   Neurologic: EOMs intact, face symmetric, no aphasia or slurred speech, strength 5/5 in BUE, 5/5 in BLE, sensation grossly intact, alert and oriented x 4.     _______________________________________________________________________  Care Plan discussed with:    Comments   Patient x    Family      RN     Care Manager                    Consultant: _______________________________________________________________________  Expected  Disposition:   Home with Family    HH/PT/OT/RN    SNF/LTC x   [facility]    ________________________________________________________________________  TOTAL TIME:  60 Minutes    Critical Care Provided     Minutes non procedure based      Comments     Reviewed previous records   >50% of visit spent in counseling and coordination of care  Discussion with patient and/or family and questions answered       ________________________________________________________________________  Luis Young MD    Procedures: see electronic medical records for all procedures/Xrays and details which were not copied into this note but were reviewed prior to creation of Plan. LAB DATA REVIEWED:    Recent Results (from the past 24 hour(s))   EKG, 12 LEAD, INITIAL    Collection Time: 02/23/17 10:31 PM   Result Value Ref Range    Ventricular Rate 111 BPM    Atrial Rate 111 BPM    P-R Interval 156 ms    QRS Duration 82 ms    Q-T Interval 328 ms    QTC Calculation (Bezet) 446 ms    Calculated P Axis 63 degrees    Calculated R Axis -25 degrees    Calculated T Axis 36 degrees    Diagnosis       Sinus tachycardia  Otherwise normal ECG  No previous ECGs available     CBC WITH AUTOMATED DIFF    Collection Time: 02/23/17 10:40 PM   Result Value Ref Range    WBC 8.1 3.6 - 11.0 K/uL    RBC 2.81 (L) 3.80 - 5.20 M/uL    HGB 9.0 (L) 11.5 - 16.0 g/dL    HCT 26.6 (L) 35.0 - 47.0 %    MCV 94.7 80.0 - 99.0 FL    MCH 32.0 26.0 - 34.0 PG    MCHC 33.8 30.0 - 36.5 g/dL    RDW 13.9 11.5 - 14.5 %    PLATELET 335 682 - 169 K/uL    NEUTROPHILS 84 %    BAND NEUTROPHILS 5 %    LYMPHOCYTES 4 %    MONOCYTES 7 %    EOSINOPHILS 0 %    BASOPHILS 0 %    ABS. NEUTROPHILS 7.2 K/UL    ABS. LYMPHOCYTES 0.3 K/UL    ABS. MONOCYTES 0.6 K/UL    ABS. EOSINOPHILS 0.0 K/UL    ABS.  BASOPHILS 0.0 K/UL    RBC COMMENTS NORMOCYTIC, NORMOCHROMIC      WBC COMMENTS TOXIC GRANULATION     METABOLIC PANEL, COMPREHENSIVE    Collection Time: 02/23/17 10:40 PM   Result Value Ref Range    Sodium 136 136 - 145 mmol/L    Potassium 3.5 3.5 - 5.1 mmol/L    Chloride 99 97 - 108 mmol/L    CO2 27 21 - 32 mmol/L    Anion gap 10 5 - 15 mmol/L    Glucose 125 (H) 65 - 100 mg/dL    BUN 14 6 - 20 MG/DL    Creatinine 1.11 (H) 0.55 - 1.02 MG/DL    BUN/Creatinine ratio 13 12 - 20      GFR est AA 58 (L) >60 ml/min/1.73m2    GFR est non-AA 48 (L) >60 ml/min/1.73m2    Calcium 8.0 (L) 8.5 - 10.1 MG/DL    Bilirubin, total 0.2 0.2 - 1.0 MG/DL    ALT (SGPT) 33 12 - 78 U/L    AST (SGOT) 29 15 - 37 U/L    Alk.  phosphatase 90 45 - 117 U/L    Protein, total 6.4 6.4 - 8.2 g/dL    Albumin 2.9 (L) 3.5 - 5.0 g/dL    Globulin 3.5 2.0 - 4.0 g/dL    A-G Ratio 0.8 (L) 1.1 - 2.2     TROPONIN I    Collection Time: 02/23/17 10:40 PM   Result Value Ref Range    Troponin-I, Qt. <0.04 <0.05 ng/mL   CK W/ REFLX CKMB    Collection Time: 02/23/17 10:40 PM   Result Value Ref Range    CK 31 26 - 192 U/L   PROTHROMBIN TIME + INR    Collection Time: 02/23/17 10:40 PM   Result Value Ref Range    INR 1.1 0.9 - 1.1      Prothrombin time 10.9 9.0 - 11.1 sec   URINALYSIS W/ REFLEX CULTURE    Collection Time: 02/24/17  2:39 AM   Result Value Ref Range    Color YELLOW/STRAW      Appearance CLOUDY (A) CLEAR      Specific gravity 1.007 1.003 - 1.030      pH (UA) 6.0 5.0 - 8.0      Protein NEGATIVE  NEG mg/dL    Glucose NEGATIVE  NEG mg/dL    Ketone NEGATIVE  NEG mg/dL    Bilirubin NEGATIVE  NEG      Blood SMALL (A) NEG      Urobilinogen 0.2 0.2 - 1.0 EU/dL    Nitrites NEGATIVE  NEG      Leukocyte Esterase MODERATE (A) NEG      WBC PENDING /hpf    RBC PENDING /hpf    Epithelial cells PENDING /lpf    Bacteria PENDING /hpf    UA:UC IF INDICATED PENDING

## 2017-02-24 NOTE — ED NOTES
Upon performing orthostatics, while standing, pt began to faint with eyes rolling in the back of her head and patient \"moaning out\". MD Barber Mo aware and will be at bedside to assess.

## 2017-02-24 NOTE — ED NOTES
TRANSFER - OUT REPORT:    Verbal report given to Hoang Munoz RN (name) on June Nannette Bees  being transferred to [facility](unit) for routine progression of care       Report consisted of patients Situation, Background, Assessment and   Recommendations(SBAR). Information from the following report(s) SBAR was reviewed with the receiving nurse. Lines:   Peripheral IV 02/23/17 Left Antecubital (Active)   Site Assessment Clean, dry, & intact 2/23/2017  9:49 PM   Phlebitis Assessment 0 2/23/2017  9:49 PM   Infiltration Assessment 0 2/23/2017  9:49 PM   Dressing Status Clean, dry, & intact 2/23/2017  9:49 PM   Dressing Type Transparent 2/23/2017  9:49 PM   Hub Color/Line Status Purple 2/23/2017  9:49 PM   Action Taken Catheter repositioned 2/23/2017  9:49 PM   Alcohol Cap Used Yes 2/23/2017  9:49 PM       Peripheral IV 02/23/17 Right Antecubital (Active)   Site Assessment Clean, dry, & intact 2/23/2017 11:32 PM   Phlebitis Assessment 0 2/23/2017 11:32 PM   Infiltration Assessment 0 2/23/2017 11:32 PM   Dressing Status Clean, dry, & intact 2/23/2017 11:32 PM        Opportunity for questions and clarification was provided. Patient transported with:   Monitor    Receiving RN is aware, repeat H&H needs to be drawn 1 hour post blood transfusion.

## 2017-02-24 NOTE — PROGRESS NOTES
* No surgery found *  Bedside shift change report given to LOBO Perry (oncoming nurse) by Helen Krueger RN (offgoing nurse). Report included the following information SBAR, Kardex, ED Summary, Intake/Output, MAR and Recent Results. Zone Phone:   [phone]      Significant changes during shift:  Arrived onto unit; finished transfusing 1 unit of blood; H&H redrawn and revealed a level of 9.3        Patient Information    June Williams  76 y.o.  2/23/2017  9:30 PM by Tasia Espana MD. Shabnam Newberry was admitted from Assisted Living    Problem List    Patient Active Problem List    Diagnosis Date Noted    Syncope 02/24/2017     Past Medical History:   Diagnosis Date    Baker's cyst of knee     Depression     Hypertension     [address]rhe[facility]oid [address]) ([facility] [address]          Core Measures:    CVA: No No  CHF:No No  PNA:No No    Post Op Surgical (If Applicable):     n/a    Activity Status:    OOB to Chair No  Ambulated this shift No   Bed Rest Yes    Supplemental O2: (If Applicable)    n/a      LINES AND DRAINS:    18 in the right AC, infusing NS at 100    DVT prophylaxis:    DVT prophylaxis Med- No  DVT prophylaxis SCD or MARISSA- No     Wounds: (If Applicable)    Wounds- No    Location -    Patient Safety:    Falls Score Total Score: 3  Safety Level_______  Bed Alarm On? No  Sitter?  No    Plan for upcoming shift: monitor H&H        Discharge Plan: No -    Active Consults:  IP CONSULT TO GASTROENTEROLOGY

## 2017-02-25 LAB
ANION GAP BLD CALC-SCNC: 8 MMOL/L (ref 5–15)
BASOPHILS # BLD AUTO: 0 K/UL (ref 0–0.1)
BASOPHILS # BLD: 0 % (ref 0–1)
BUN SERPL-MCNC: 9 MG/DL (ref 6–20)
BUN/CREAT SERPL: 25 (ref 12–20)
CALCIUM SERPL-MCNC: 7.5 MG/DL (ref 8.5–10.1)
CHLORIDE SERPL-SCNC: 110 MMOL/L (ref 97–108)
CO2 SERPL-SCNC: 24 MMOL/L (ref 21–32)
CREAT SERPL-MCNC: 0.36 MG/DL (ref 0.55–1.02)
EOSINOPHIL # BLD: 0 K/UL (ref 0–0.4)
EOSINOPHIL NFR BLD: 0 % (ref 0–7)
ERYTHROCYTE [DISTWIDTH] IN BLOOD BY AUTOMATED COUNT: 14.7 % (ref 11.5–14.5)
GLUCOSE SERPL-MCNC: 87 MG/DL (ref 65–100)
HCT VFR BLD AUTO: 22.2 % (ref 35–47)
HCT VFR BLD AUTO: 23.5 % (ref 35–47)
HCT VFR BLD AUTO: 26.2 % (ref 35–47)
HEMOCCULT STL QL: POSITIVE
HGB BLD-MCNC: 7.4 G/DL (ref 11.5–16)
HGB BLD-MCNC: 8 G/DL (ref 11.5–16)
HGB BLD-MCNC: 8.5 G/DL (ref 11.5–16)
LYMPHOCYTES # BLD AUTO: 36 % (ref 12–49)
LYMPHOCYTES # BLD: 1.1 K/UL (ref 0.8–3.5)
MCH RBC QN AUTO: 31.5 PG (ref 26–34)
MCHC RBC AUTO-ENTMCNC: 33.3 G/DL (ref 30–36.5)
MCV RBC AUTO: 94.5 FL (ref 80–99)
MONOCYTES # BLD: 0.3 K/UL (ref 0–1)
MONOCYTES NFR BLD AUTO: 10 % (ref 5–13)
NEUTS SEG # BLD: 1.6 K/UL (ref 1.8–8)
NEUTS SEG NFR BLD AUTO: 54 % (ref 32–75)
PLATELET # BLD AUTO: 147 K/UL (ref 150–400)
POTASSIUM SERPL-SCNC: 3.5 MMOL/L (ref 3.5–5.1)
RBC # BLD AUTO: 2.35 M/UL (ref 3.8–5.2)
SODIUM SERPL-SCNC: 142 MMOL/L (ref 136–145)
WBC # BLD AUTO: 3 K/UL (ref 3.6–11)

## 2017-02-25 PROCEDURE — 65660000000 HC RM CCU STEPDOWN

## 2017-02-25 PROCEDURE — 74011636637 HC RX REV CODE- 636/637: Performed by: INTERNAL MEDICINE

## 2017-02-25 PROCEDURE — 74011250636 HC RX REV CODE- 250/636: Performed by: INTERNAL MEDICINE

## 2017-02-25 PROCEDURE — 80048 BASIC METABOLIC PNL TOTAL CA: CPT | Performed by: INTERNAL MEDICINE

## 2017-02-25 PROCEDURE — 74011250637 HC RX REV CODE- 250/637: Performed by: INTERNAL MEDICINE

## 2017-02-25 PROCEDURE — 36415 COLL VENOUS BLD VENIPUNCTURE: CPT | Performed by: INTERNAL MEDICINE

## 2017-02-25 PROCEDURE — 82272 OCCULT BLD FECES 1-3 TESTS: CPT | Performed by: INTERNAL MEDICINE

## 2017-02-25 PROCEDURE — 85025 COMPLETE CBC W/AUTO DIFF WBC: CPT | Performed by: INTERNAL MEDICINE

## 2017-02-25 PROCEDURE — 85018 HEMOGLOBIN: CPT | Performed by: INTERNAL MEDICINE

## 2017-02-25 RX ORDER — MORPHINE SULFATE 2 MG/ML
1 INJECTION, SOLUTION INTRAMUSCULAR; INTRAVENOUS
Status: DISCONTINUED | OUTPATIENT
Start: 2017-02-25 | End: 2017-02-25

## 2017-02-25 RX ADMIN — PREDNISOLONE ACETATE 1 DROP: 10 SUSPENSION/ DROPS OPHTHALMIC at 16:18

## 2017-02-25 RX ADMIN — ACETAMINOPHEN 325 MG: 325 TABLET, FILM COATED ORAL at 16:07

## 2017-02-25 RX ADMIN — CLONAZEPAM 0.5 MG: 0.5 TABLET ORAL at 20:03

## 2017-02-25 RX ADMIN — CLONAZEPAM 0.5 MG: 0.5 TABLET ORAL at 16:06

## 2017-02-25 RX ADMIN — MELATONIN 3 MG ORAL TABLET 6 MG: 3 TABLET ORAL at 20:02

## 2017-02-25 RX ADMIN — CLONAZEPAM 0.5 MG: 0.5 TABLET ORAL at 08:04

## 2017-02-25 RX ADMIN — HYDROCODONE BITARTRATE AND ACETAMINOPHEN 1 TABLET: 5; 325 TABLET ORAL at 07:45

## 2017-02-25 RX ADMIN — DOCUSATE SODIUM 100 MG: 100 CAPSULE, LIQUID FILLED ORAL at 08:04

## 2017-02-25 RX ADMIN — TRAZODONE HYDROCHLORIDE 150 MG: 100 TABLET ORAL at 20:03

## 2017-02-25 RX ADMIN — Medication 1 MG: at 11:22

## 2017-02-25 RX ADMIN — CEFUROXIME AXETIL 250 MG: 250 TABLET ORAL at 08:09

## 2017-02-25 RX ADMIN — PREDNISOLONE ACETATE 1 DROP: 10 SUSPENSION/ DROPS OPHTHALMIC at 08:13

## 2017-02-25 RX ADMIN — FOLIC ACID 1 MG: 1 TABLET ORAL at 08:04

## 2017-02-25 RX ADMIN — PREDNISOLONE ACETATE 1 DROP: 10 SUSPENSION/ DROPS OPHTHALMIC at 20:05

## 2017-02-25 RX ADMIN — SODIUM CHLORIDE 100 ML/HR: 900 INJECTION, SOLUTION INTRAVENOUS at 07:46

## 2017-02-25 RX ADMIN — PREDNISONE 40 MG: 10 TABLET ORAL at 08:04

## 2017-02-25 RX ADMIN — PANTOPRAZOLE SODIUM 40 MG: 40 TABLET, DELAYED RELEASE ORAL at 08:04

## 2017-02-25 RX ADMIN — HYDROCODONE BITARTRATE AND ACETAMINOPHEN 1 TABLET: 5; 325 TABLET ORAL at 00:01

## 2017-02-25 RX ADMIN — VENLAFAXINE HYDROCHLORIDE 150 MG: 150 CAPSULE, EXTENDED RELEASE ORAL at 08:04

## 2017-02-25 RX ADMIN — SODIUM CHLORIDE 100 ML/HR: 900 INJECTION, SOLUTION INTRAVENOUS at 06:31

## 2017-02-25 RX ADMIN — OLANZAPINE 10 MG: 10 TABLET, FILM COATED ORAL at 20:03

## 2017-02-25 RX ADMIN — Medication 10 ML: at 20:04

## 2017-02-25 RX ADMIN — SODIUM CHLORIDE 100 ML/HR: 900 INJECTION, SOLUTION INTRAVENOUS at 23:46

## 2017-02-25 RX ADMIN — HYDROCODONE BITARTRATE AND ACETAMINOPHEN 1 TABLET: 5; 325 TABLET ORAL at 20:08

## 2017-02-25 RX ADMIN — HYDROCODONE BITARTRATE AND ACETAMINOPHEN 1 TABLET: 5; 325 TABLET ORAL at 14:53

## 2017-02-25 RX ADMIN — MIRTAZAPINE 45 MG: 15 TABLET, FILM COATED ORAL at 20:03

## 2017-02-25 RX ADMIN — HYDROCODONE BITARTRATE AND ACETAMINOPHEN 1 TABLET: 5; 325 TABLET ORAL at 23:43

## 2017-02-25 RX ADMIN — METOPROLOL TARTRATE 25 MG: 25 TABLET ORAL at 08:04

## 2017-02-25 NOTE — PROGRESS NOTES
* No surgery found *  Bedside shift change report given to Chalino Mckeon RN (oncoming nurse) by Nikita Fernandez RN (offgoing nurse). Report included the following information SBAR, Kardex, ED Summary, Intake/Output, MAR and Recent Results. Zone Phone:   [phone]      Significant changes during shift:  None        Patient Information    Elaine Mary  76 y.o.  2/23/2017  9:30 PM by Holly Huggins MD. Elaine Arreguin was admitted from Assisted Living    Problem List    Patient Active Problem List    Diagnosis Date Noted    Syncope 02/24/2017     Past Medical History:   Diagnosis Date    Baker's cyst of knee     Depression     Hypertension     RA (rheumatoid arthritis) ([facility] [address]          Core Measures:    CVA: No No  CHF:No No  PNA:No No    Post Op Surgical (If Applicable):     n/a    Activity Status:    OOB to Chair No  Ambulated this shift No   Bed Rest Yes    Supplemental O2: (If Applicable)    n/a      LINES AND DRAINS:    peripheral left A/C    DVT prophylaxis:    DVT prophylaxis Med- No  DVT prophylaxis SCD or MARISSA- YES    Wounds: (If Applicable)    Wounds- No    Location -    Patient Safety:    Falls Score Total Score: 3  Safety Level_______  Bed Alarm On? No  Sitter?  No    Plan for upcoming shift: monitor H&H        Discharge Plan: Yes when stable    Active Consults:  IP CONSULT TO GASTROENTEROLOGY

## 2017-02-25 NOTE — ROUTINE PROCESS
* No surgery found *  Bedside shift change report given to [address] [place] [address] (oncoming nurse) by Julissa Ferreira RN (offgoing nurse). Report included the following information SBAR, Kardex, ED Summary, Intake/Output, MAR and Recent Results.     [facility] Phone: [phone]      Significant changes during shift:   HGB-7.7--- 7.4, HCT ,22.6-22.4 no bowel movements no physical signs of bleeding MD Lew made aware. Will continue to monitor pt.           Patient Information     Elaine Mary  76 y.o.  2/23/2017 9:30 PM by Warner Carcamo MD. Elaine Smith was admitted from Assisted Living     Problem List          Patient Active Problem List     Diagnosis Date Noted    Syncope 02/24/2017           Past Medical History:   Diagnosis Date    Baker's cyst of knee      Depression      Hypertension      RA (rheumatoid [facility]) [address] [address]              Core Measures:     CVA: No No  CHF:No No  PNA:No No     Post Op Surgical (If Applicable):      n/a     Activity Status:     OOB to Chair No  Ambulated this shift No   Bed Rest Yes     Supplemental O2: (If Applicable)     n/a        LINES AND DRAINS:     peripheral left A/C     DVT prophylaxis:     DVT prophylaxis Med- No  DVT prophylaxis SCD or MARISSA- YES     Wounds: (If Applicable)     Wounds- No     Location -     Patient Safety:     Falls Score Total Score: 3  Safety Level_______  Bed Alarm On? No  Sitter?  No     Plan for upcoming shift: monitor H&H every 8 hours, IVF.           Discharge Plan: Yes when stable     Active Consults:  IP CONSULT TO GASTROENTEROLOGY

## 2017-02-25 NOTE — PROGRESS NOTES
Patient states that she was constipated for 4+ days.  She has had 6 large sized, soft, formed brown stools this AM.

## 2017-02-25 NOTE — PROGRESS NOTES
GI Progress Note Jasmyne Boston for Jourdan Aguero  Sharad Martin VAI:[id number] CXP:[id number]   ATTG: Dr. Thao Walsh  PCP: Sharad Martin MD  Date/Time:  2/25/2017 9:54 AM   Assessment:   · JANE  · Syncope, RA on prednisone and methotrexate, recurrent UTI     Plan:   · Follow repeat Hgb to clarify this trend  · Continue BID protonix  · May require further PRBCs based on repeat hgb  · Without any signs of GI bleeding, may need to consider hemolysis (though haptoglobin is actually elevated)  · Clears okay today     Subjective:   Hgb up and down quite a bit. 7 gm/dL prior to PRBCs, then > 9, then back down to 7 gm/dL. Hgb 12 gm/dL last month. Several BMs today, all medium brown without any jae blood or melena. I viewed a large one this AM    Complaint Y/N Description   Abdominal Pain n    Hematemesis n    Hematochezia n    Melena n    Constipation n    Diarrhea n    Dyspepsia n    Dysphagia n    Jaundiced n    Nausea/vomiting n      Review of Systems:  Symptom Y/N Comments  Symptom Y/N Comments   Fever/Chills n   Chest Pain n    Cough n   Headaches n    Sputum n   Joint Pain n    SOB/MADERA n   Pruritis/Rash n    Tolerating Diet n   Other       Could NOT obtain due to:      Objective:   VITALS:   Last 24hrs VS reviewed since prior progress note. Most recent are:  Visit Vitals    /53    Pulse (!) 120    Temp 98.2 °F (36.8 °C)    Resp 16    Ht 5' 2\" (1.575 m)    Wt 79.8 kg (176 lb)    SpO2 95%    Breastfeeding No    BMI 32.19 kg/m2       Intake/Output Summary (Last 24 hours) at 02/25/17 0954  Last data filed at 02/25/17 0631   Gross per 24 hour   Intake           2411.3 ml   Output                0 ml   Net           2411.3 ml     PHYSICAL EXAM:  General: WD, WN. Alert, cooperative, no acute distress    HEENT: NC, Atraumatic. Anicteric sclerae. Lungs:  CTA Bilaterally. No Wheezing/Rhonchi/Rales.   Heart:  Regular  rhythm,  No murmur (), No Rubs, No Gallops  Abdomen: Soft, Non distended, Non tender.  +Bowel sounds, no HSM  Extremities: No c/c/e  Neurologic:  Alert and oriented X 3. No acute neurological distress   Psych:   Good insight. Not anxious nor agitated. Lab and Radiology Data Reviewed: (see below)    Medications Reviewed: (see below)  PMH/SH reviewed - no change compared to H&P  ________________________________________________________________________  Total time spent with patient: 15 minutes ________________________________________________________________________  Care Plan discussed with:  Patient x   Family     RN x              Hospitalist:  stella Dutton MD     Procedures: see electronic medical records for all procedures/Xrays and details which were not copied into this note but were reviewed prior to creation of Plan. LABS:  Recent Labs      02/25/17 0404 02/24/17 2247 02/24/17 0430   WBC  3.0*   --    --   4.7   HGB  7.4*  7.7*   < >  7.6*   HCT  22.2*  22.6*   < >  22.9*   PLT  147*   --    --   176    < > = values in this interval not displayed. Recent Labs      02/25/17 0404 02/24/17 0430 02/23/17 2240   NA  142  137  136   K  3.5  3.3*  3.5   CL  110*  102  99   CO2  24  26  27   BUN  9  15  14   CREA  0.36*  0.70  1.11*   GLU  87  119*  125*   CA  7.5*  7.3*  8.0*     Recent Labs      02/23/17 2240   SGOT  29   AP  90   TP  6.4   ALB  2.9*   GLOB  3.5     Recent Labs      02/23/17 2240   INR  1.1   PTP  10.9      Recent Labs      02/24/17 0430   FE  16*   TIBC  185*   PSAT  9*   FERR  115      No results found for: FOL, RBCF  No results for input(s): PH, PCO2, PO2 in the last 72 hours. No results for input(s): CPK, CKMB in the last 72 hours.     No lab exists for component: TROPONINI  Lab Results   Component Value Date/Time    Color YELLOW/STRAW 02/24/2017 02:39 AM    Appearance CLOUDY 02/24/2017 02:39 AM    Specific gravity 1.007 02/24/2017 02:39 AM    pH (UA) 6.0 02/24/2017 02:39 AM    Protein NEGATIVE  02/24/2017 02:39 AM    Glucose NEGATIVE 02/24/2017 02:39 AM    Ketone NEGATIVE  02/24/2017 02:39 AM    Bilirubin NEGATIVE  02/24/2017 02:39 AM    Urobilinogen 0.2 02/24/2017 02:39 AM    Nitrites NEGATIVE  02/24/2017 02:39 AM    Leukocyte Esterase MODERATE 02/24/2017 02:39 AM    Epithelial cells FEW 02/24/2017 02:39 AM    Bacteria 2+ 02/24/2017 02:39 AM    WBC 5-10 02/24/2017 02:39 AM    RBC 0-5 02/24/2017 02:39 AM       MEDICATIONS:  Current Facility-Administered Medications   Medication Dose Route Frequency    morphine injection 1 mg  1 mg IntraVENous Q4H PRN    acetaminophen (TYLENOL) tablet 325 mg  325 mg Oral Q4H PRN    cefUROXime (CEFTIN) tablet 250 mg  250 mg Oral DAILY    clonazePAM (KlonoPIN) tablet 0.5 mg  0.5 mg Oral TID    docusate sodium (COLACE) capsule 100 mg  100 mg Oral BID    folic acid (FOLVITE) tablet 1 mg  1 mg Oral DAILY    HYDROcodone-acetaminophen (NORCO) 5-325 mg per tablet 1 Tab  1 Tab Oral Q6H PRN    [START ON 2/26/2017] methotrexate (RHEUMATREX) tablet 15 mg  15 mg Oral every Sunday    metoprolol tartrate (LOPRESSOR) tablet 25 mg  25 mg Oral BID    mirtazapine (REMERON) tablet 45 mg  45 mg Oral QHS    OLANZapine (ZyPREXA) tablet 10 mg  10 mg Oral QHS    artificial tears (dextran 70-hypromellose) (NATURAL BALANCE) 0.1-0.3 % ophthalmic solution 1 Drop  1 Drop Both Eyes PRN    prednisoLONE acetate (PRED FORTE) 1 % ophthalmic suspension 1 Drop  1 Drop Left Eye TID    traZODone (DESYREL) tablet 150 mg  150 mg Oral QHS    venlafaxine-SR (EFFEXOR-XR) capsule 150 mg  150 mg Oral DAILY    predniSONE (DELTASONE) tablet 40 mg  40 mg Oral DAILY WITH BREAKFAST    0.9% sodium chloride infusion  100 mL/hr IntraVENous CONTINUOUS    sodium chloride (NS) flush 5-10 mL  5-10 mL IntraVENous Q8H    sodium chloride (NS) flush 5-10 mL  5-10 mL IntraVENous PRN    ondansetron (ZOFRAN) injection 4 mg  4 mg IntraVENous Q4H PRN    pantoprazole (PROTONIX) tablet 40 mg  40 mg Oral ACB    0.9% sodium chloride infusion 250 mL  250 mL IntraVENous PRN    melatonin tablet 6 mg  6 mg Oral QHS PRN

## 2017-02-25 NOTE — PROGRESS NOTES
* No surgery found *  Bedside shift change report given to [employer], RN (oncoming nurse) by John Mcmullen RN (offgoing nurse). Report included the following information SBAR, Kardex, ED Summary, Intake/Output, MAR and Recent Results. Zone Phone:   [phone]      Significant changes during shift:  None        Patient Information    Elaine Mary  76 y.o.  2/23/2017  9:30 PM by Sima Connors MD. Elaine Mackey [facility]    Problem List    Patient Active Problem List    Diagnosis Date Noted    Syncope 02/24/2017     Past Medical History:   Diagnosis Date    Baker's cyst of knee     Depression     Hypertension     RA (rheumatoid [facility]) ([facility] [address]          Core Measures:    CVA: No No  CHF:No No  PNA:No No    Post Op Surgical (If Applicable):     n/a    Activity Status:    OOB to Chair No  Ambulated this shift No   Bed Rest Yes    Supplemental O2: (If Applicable)    n/a      LINES AND DRAINS:    peripheral left A/C    DVT prophylaxis:    DVT prophylaxis Med- No  DVT prophylaxis SCD or MARISSA- YES    Wounds: (If Applicable)    Wounds- No    Location -    Patient Safety:    Falls Score Total Score: 3  Safety Level_______  Bed Alarm On? No  Sitter? No    Plan for upcoming shift: monitor H&H every 8 hours, IVF.         Discharge Plan: Yes when stable    Active Consults:  IP CONSULT TO GASTROENTEROLOGY

## 2017-02-25 NOTE — PROGRESS NOTES
Hospitalist Progress Note    NAME: Elaine Mary   :  1940   MRN:  [id number]       Interim Hospital Summary: 78 y.o. female whom presented on 2017 with      Assessment / Plan:  Iron Deficiency Anemia  Syncope  - orthostatic hypotension likely from anemia.    -continue IVF  -s/p 1 unit prbc  -H/H is erratic.  9.3--> 7.4 now back to 8.5 on repeat. If H/H continues to be stable or improve, will likely dc tomrorow and schedule outpt cscope.  -case discussed with Dr Mahnaz Vallejo RA  -continue prednisone 40 mg daily  -humira twice a month, methotrexate weekly     Recurrent UTI  -will continue ceftin at ppx dose  -f/u urine culture, UA with 5-10 WBC 2+bacteria, patient asymptomatic           Code Status: DNR - discussed with patient at bedside  Surrogate Decision Maker: daughter. Discussed plans and treatment with daughter.     DVT Prophylaxis: SCD  GI Prophylaxis: not indicated     Baseline: staying adelfo currently, uses walker             Subjective:     Chief Complaint / Reason for Physician Visit  No acute complaints. Discussed with RN events overnight. Review of Systems:  Symptom Y/N Comments  Symptom Y/N Comments   Fever/Chills n   Chest Pain n    Poor Appetite n   Edema n    Cough n   Abdominal Pain n    Sputum n   Joint Pain n    SOB/MADERA    Pruritis/Rash     Nausea/vomit    Tolerating PT/OT     Diarrhea    Tolerating Diet     Constipation    Other       Could NOT obtain due to:      Objective:     VITALS:   Last 24hrs VS reviewed since prior progress note.  Most recent are:  Patient Vitals for the past 24 hrs:   Temp Pulse Resp BP SpO2   17 0720 98.2 °F (36.8 °C) (!) 120 16 118/53 95 %   17 0400 98.1 °F (36.7 °C) (!) 108 14 115/50 95 %   17 0031 98 °F (36.7 °C) 89 14 112/53 95 %   17 1951 98.4 °F (36.9 °C) 93 16 115/63 96 %   17 1759 - (!) 103 - 120/64 -   17 1448 98.4 °F (36.9 °C) (!) 111 16 149/69 100 %       Intake/Output Summary (Last 24 hours) at 02/25/17 1425  Last data filed at 02/25/17 1146   Gross per 24 hour   Intake             2230 ml   Output                3 ml   Net             2227 ml        PHYSICAL EXAM:  General: WD, WN. Alert, cooperative, no acute distress    EENT:  EOMI. Anicteric sclerae. MMM  Resp:  CTA bilaterally, no wheezing or rales. No accessory muscle use  CV:  Regular  rhythm,  No edema  GI:  Soft, Non distended, Non tender.  +Bowel sounds  Neurologic:  Alert and oriented X 3, normal speech,   Psych:   Good insight. Not anxious nor agitated  Skin:  No rashes. No jaundice    Reviewed most current lab test results and cultures  YES  Reviewed most current radiology test results   YES  Review and summation of old records today    NO  Reviewed patient's current orders and MAR    YES  PMH/SH reviewed - no change compared to H&P  ________________________________________________________________________  Care Plan discussed with:    Comments   Patient x    Family  x daughter   RN x    Care Manager     Consultant                        Multidiciplinary team rounds were held today with , nursing, pharmacist and clinical coordinator. Patient's plan of care was discussed; medications were reviewed and discharge planning was addressed. ________________________________________________________________________  Total NON critical care TIME:  35   Minutes  Total CRITICAL CARE TIME Spent:   Minutes non procedure based      Comments   >50% of visit spent in counseling and coordination of care     ________________________________________________________________________  Justus Joyce MD     Procedures: see electronic medical records for all procedures/Xrays and details which were not copied into this note but were reviewed prior to creation of Plan. LABS:  I reviewed today's most current labs and imaging studies.   Pertinent labs include:  Recent Labs      02/25/17   1132  02/25/17   0404  02/24/17   224   02/24/17   0436 02/23/17 2240   WBC   --   3.0*   --    --   4.7  8.1   HGB  8.5*  7.4*  7.7*   < >  7.6*  9.0*   HCT  26.2*  22.2*  22.6*   < >  22.9*  26.6*   PLT   --   147*   --    --   176  230    < > = values in this interval not displayed.      Recent Labs      02/25/17   0404  02/24/17   0430  02/23/17 2240   NA  142  137  136   K  3.5  3.3*  3.5   CL  110*  102  99   CO2  24  26  27   GLU  87  119*  125*   BUN  9  15  14   CREA  0.36*  0.70  1.11*   CA  7.5*  7.3*  8.0*   ALB   --    --   2.9*   TBILI   --    --   0.2   SGOT   --    --   29   ALT   --    --   33   INR   --    --   1.1       Signed: Ashly Floyd MD

## 2017-02-26 LAB
BACTERIA SPEC CULT: ABNORMAL
CC UR VC: ABNORMAL
HCT VFR BLD AUTO: 24.8 % (ref 35–47)
HCT VFR BLD AUTO: 27.3 % (ref 35–47)
HGB BLD-MCNC: 8.2 G/DL (ref 11.5–16)
HGB BLD-MCNC: 9.1 G/DL (ref 11.5–16)
SERVICE CMNT-IMP: ABNORMAL

## 2017-02-26 PROCEDURE — 85018 HEMOGLOBIN: CPT | Performed by: INTERNAL MEDICINE

## 2017-02-26 PROCEDURE — 74011250637 HC RX REV CODE- 250/637: Performed by: INTERNAL MEDICINE

## 2017-02-26 PROCEDURE — 74011250636 HC RX REV CODE- 250/636: Performed by: INTERNAL MEDICINE

## 2017-02-26 PROCEDURE — 74011636637 HC RX REV CODE- 636/637: Performed by: INTERNAL MEDICINE

## 2017-02-26 PROCEDURE — 74011250636 HC RX REV CODE- 250/636: Performed by: HOSPITALIST

## 2017-02-26 PROCEDURE — 65660000000 HC RM CCU STEPDOWN

## 2017-02-26 PROCEDURE — 36415 COLL VENOUS BLD VENIPUNCTURE: CPT | Performed by: INTERNAL MEDICINE

## 2017-02-26 RX ORDER — MORPHINE SULFATE 2 MG/ML
1 INJECTION, SOLUTION INTRAMUSCULAR; INTRAVENOUS ONCE
Status: COMPLETED | OUTPATIENT
Start: 2017-02-26 | End: 2017-02-26

## 2017-02-26 RX ADMIN — DOCUSATE SODIUM 100 MG: 100 CAPSULE, LIQUID FILLED ORAL at 08:22

## 2017-02-26 RX ADMIN — CEFUROXIME AXETIL 250 MG: 250 TABLET ORAL at 08:23

## 2017-02-26 RX ADMIN — HYDROCODONE BITARTRATE AND ACETAMINOPHEN 1 TABLET: 5; 325 TABLET ORAL at 12:14

## 2017-02-26 RX ADMIN — Medication 10 ML: at 22:15

## 2017-02-26 RX ADMIN — VENLAFAXINE HYDROCHLORIDE 150 MG: 150 CAPSULE, EXTENDED RELEASE ORAL at 08:21

## 2017-02-26 RX ADMIN — PREDNISOLONE ACETATE 1 DROP: 10 SUSPENSION/ DROPS OPHTHALMIC at 22:13

## 2017-02-26 RX ADMIN — Medication 1 MG: at 01:28

## 2017-02-26 RX ADMIN — PREDNISOLONE ACETATE 1 DROP: 10 SUSPENSION/ DROPS OPHTHALMIC at 15:53

## 2017-02-26 RX ADMIN — METHOTREXATE SODIUM 15 MG: 2.5 TABLET ORAL at 08:24

## 2017-02-26 RX ADMIN — PREDNISOLONE ACETATE 1 DROP: 10 SUSPENSION/ DROPS OPHTHALMIC at 08:25

## 2017-02-26 RX ADMIN — MELATONIN 3 MG ORAL TABLET 6 MG: 3 TABLET ORAL at 22:22

## 2017-02-26 RX ADMIN — CLONAZEPAM 0.5 MG: 0.5 TABLET ORAL at 15:53

## 2017-02-26 RX ADMIN — ACETAMINOPHEN 325 MG: 325 TABLET, FILM COATED ORAL at 08:22

## 2017-02-26 RX ADMIN — PANTOPRAZOLE SODIUM 40 MG: 40 TABLET, DELAYED RELEASE ORAL at 08:22

## 2017-02-26 RX ADMIN — OLANZAPINE 10 MG: 10 TABLET, FILM COATED ORAL at 22:12

## 2017-02-26 RX ADMIN — PREDNISONE 40 MG: 10 TABLET ORAL at 08:20

## 2017-02-26 RX ADMIN — CLONAZEPAM 0.5 MG: 0.5 TABLET ORAL at 08:21

## 2017-02-26 RX ADMIN — HYDROCODONE BITARTRATE AND ACETAMINOPHEN 1 TABLET: 5; 325 TABLET ORAL at 18:22

## 2017-02-26 RX ADMIN — MIRTAZAPINE 45 MG: 15 TABLET, FILM COATED ORAL at 22:12

## 2017-02-26 RX ADMIN — Medication 10 ML: at 15:53

## 2017-02-26 RX ADMIN — METOPROLOL TARTRATE 25 MG: 25 TABLET ORAL at 08:20

## 2017-02-26 RX ADMIN — TRAZODONE HYDROCHLORIDE 150 MG: 100 TABLET ORAL at 22:12

## 2017-02-26 RX ADMIN — CLONAZEPAM 0.5 MG: 0.5 TABLET ORAL at 22:12

## 2017-02-26 RX ADMIN — HYDROCODONE BITARTRATE AND ACETAMINOPHEN 1 TABLET: 5; 325 TABLET ORAL at 05:56

## 2017-02-26 RX ADMIN — FOLIC ACID 1 MG: 1 TABLET ORAL at 08:22

## 2017-02-26 NOTE — PROGRESS NOTES
Hospitalist Progress Note    NAME: Elaine Mary   :  1940   MRN:  [id number]       Interim Hospital Summary: 78 y.o. female whom presented on 2017 with      Assessment / Plan:  Iron Deficiency Anemia  Syncope  - orthostatic hypotension likely from anemia.    -continue IVF  -s/p 1 unit prbc  -H/H is erratic.  9.3--> 7.4 now back to 8.5 on repeat. If H/H continues to be stable or improve, will likely dc tomrorow and schedule outpt cscope. -GI following      RA  -continue prednisone 40 mg daily  -humira twice a month, methotrexate weekly     Recurrent UTI  -will continue ceftin at ppx dose  -f/u urine culture, UA with 5-10 WBC 2+bacteria, patient asymptomatic     Decondition  -up in chair  -PT/OT  -likely back to [facility] in the AM     Code Status: DNR - discussed with patient at bedside  Surrogate Decision Maker: daughter. Discussed plans and treatment with daughter, Janet.     DVT Prophylaxis: SCD  GI Prophylaxis: not indicated     Baseline: staying adelfo currently, uses walker             Subjective:     Chief Complaint / Reason for Physician Visit  No acute complaints. Discussed with RN events overnight. Review of Systems:  Symptom Y/N Comments  Symptom Y/N Comments   Fever/Chills n   Chest Pain n    Poor Appetite n   Edema n    Cough n   Abdominal Pain n    Sputum n   Joint Pain n    SOB/MADERA    Pruritis/Rash     Nausea/vomit    Tolerating PT/OT     Diarrhea    Tolerating Diet     Constipation    Other       Could NOT obtain due to:      Objective:     VITALS:   Last 24hrs VS reviewed since prior progress note.  Most recent are:  Patient Vitals for the past 24 hrs:   Temp Pulse Resp BP SpO2   17 1132 97.8 °F (36.6 °C) 82 16 121/59 95 %   17 0717 98 °F (36.7 °C) 100 16 138/67 98 %   17 0400 97.9 °F (36.6 °C) 94 14 149/75 -   17 2348 97.9 °F (36.6 °C) 99 18 141/78 95 %   17 2000 98.1 °F (36.7 °C) (!) 105 18 123/68 99 %   17 1555 98.1 °F (36.7 °C) 98 18 106/56 97 %       Intake/Output Summary (Last 24 hours) at 02/26/17 1312  Last data filed at 02/26/17 0529   Gross per 24 hour   Intake          2518.33 ml   Output             1600 ml   Net           918.33 ml        PHYSICAL EXAM:  General: WD, WN. Alert, cooperative, no acute distress    EENT:  EOMI. Anicteric sclerae. MMM  Resp:  CTA bilaterally, no wheezing or rales. No accessory muscle use  CV:  Regular  rhythm,  No edema  GI:  Soft, Non distended, Non tender.  +Bowel sounds  Neurologic:  Alert and oriented X 3, normal speech,   Psych:   Good insight. Not anxious nor agitated  Skin:  No rashes. No jaundice    Reviewed most current lab test results and cultures  YES  Reviewed most current radiology test results   YES  Review and summation of old records today    NO  Reviewed patient's current orders and MAR    YES  PMH/ reviewed - no change compared to H&P  ________________________________________________________________________  Care Plan discussed with:    Comments   Patient x    Family  x daughter   RN x    Care Manager     Consultant                        Multidiciplinary team rounds were held today with , nursing, pharmacist and clinical coordinator. Patient's plan of care was discussed; medications were reviewed and discharge planning was addressed. ________________________________________________________________________  Total NON critical care TIME:  35   Minutes  Total CRITICAL CARE TIME Spent:   Minutes non procedure based      Comments   >50% of visit spent in counseling and coordination of care     ________________________________________________________________________  Stu Diaz MD     Procedures: see electronic medical records for all procedures/Xrays and details which were not copied into this note but were reviewed prior to creation of Plan. LABS:  I reviewed today's most current labs and imaging studies.   Pertinent labs include:  Recent Labs      02/26/17   6608 02/26/17 0413  02/25/17   1858   02/25/17   0404   02/24/17   0430  02/23/17   2240   WBC   --    --    --    --   3.0*   --   4.7  8.1   HGB  9.1*  8.2*  8.0*   < >  7.4*   < >  7.6*  9.0*   HCT  27.3*  24.8*  23.5*   < >  22.2*   < >  22.9*  26.6*   PLT   --    --    --    --   147*   --   176  230    < > = values in this interval not displayed.      Recent Labs      02/25/17   0404  02/24/17   0430  02/23/17   2240   NA  142  137  136   K  3.5  3.3*  3.5   CL  110*  102  99   CO2  24  26  27   GLU  87  119*  125*   BUN  9  15  14   CREA  0.36*  0.70  1.11*   CA  7.5*  7.3*  8.0*   ALB   --    --   2.9*   TBILI   --    --   0.2   SGOT   --    --   29   ALT   --    --   33   INR   --    --   1.1       Signed: Homa Gates MD

## 2017-02-26 NOTE — ROUTINE PROCESS
Bedside shift change report given to Lima Lees RN (oncoming nurse) by Megan Liu RN (offgoing nurse). Report included the following information SBAR, Kardex, ED Summary, Intake/Output, MAR and Recent Results.      Zone Phone: [phone]       Significant changes during shift: complaining of hip pain, not controlled with Divya Ra called MD given Morphine x1 HGB 8.2        Patient Information      June Williams  76 y.o.  2/23/2017 9:30 PM by Ruth Gonzalez MD. Tom Zuniga was admitted from [facility]  Problem List              Patient Active Problem List      Diagnosis Date Noted    Syncope 02/24/2017               Past Medical History:   Diagnosis Date    Baker's cyst of knee       Depression       Hypertension       RA (rheumatoid arthritis) ([facility] [address]                  Core Measures:      CVA: No No  CHF:No No  PNA:No No      Post Op Surgical (If Applicable):       n/a      Activity Status:      OOB to Chair No  Ambulated this shift No   Bed Rest Yes      Supplemental O2: (If Applicable)      n/a          LINES AND DRAINS:      peripheral left A/C      DVT prophylaxis:      DVT prophylaxis Med- No  DVT prophylaxis SCD or MARISSA- YES      Wounds: (If Applicable)      Wounds- No      Location -      Patient Safety:      Falls Score Total Score: 3  Safety Level_______  Bed Alarm On? No  Sitter? No      Plan for upcoming shift: monitor H&H every 8 hours, IVF.               Discharge Plan: Yes when stable      Active Consults:  IP CONSULT TO GASTROENTEROLOGY

## 2017-02-26 NOTE — PROGRESS NOTES
Chart ecin'd to [facility] to see if they can accept pt back today if stable[phone]  No response from admission at [facility] as to being able to accept the pt back today.

## 2017-02-26 NOTE — PROGRESS NOTES
GI Progress Note Vitaliy Sanabria for Jason Hilton BTT:[id number] ZOQ:[id number]   ATTG: Dr. Nikkie Hinton  PCP: Aneudy Hilton MD  Date/Time:  2/26/2017 9:54 AM   Assessment:   · JANE  · Syncope, RA on prednisone and methotrexate, recurrent UTI     Plan:   · Continue BID protonix through D/C  · Advance diet  · Needs outpatient EGD/Colonoscopy with Dr. Jason Alejandro     Subjective:   Hgb now more stable, the 8 gm/dL range. No overt bleeding. No further BMs today. Feels somewhat weak. Complaint Y/N Description   Abdominal Pain n    Hematemesis n    Hematochezia n    Melena n    Constipation n    Diarrhea n    Dyspepsia n    Dysphagia n    Jaundiced n    Nausea/vomiting n      Review of Systems:  Symptom Y/N Comments  Symptom Y/N Comments   Fever/Chills n   Chest Pain n    Cough n   Headaches n    Sputum n   Joint Pain n    SOB/MADERA n   Pruritis/Rash n    Tolerating Diet n   Other       Could NOT obtain due to:      Objective:   VITALS:   Last 24hrs VS reviewed since prior progress note. Most recent are:  Visit Vitals    /67    Pulse 100    Temp 98 °F (36.7 °C)    Resp 16    Ht 5' 2\" (1.575 m)    Wt 79.8 kg (176 lb)    SpO2 98%    Breastfeeding No    BMI 32.19 kg/m2       Intake/Output Summary (Last 24 hours) at 02/26/17 0847  Last data filed at 02/26/17 0529   Gross per 24 hour   Intake          2618.33 ml   Output             1603 ml   Net          1015.33 ml     PHYSICAL EXAM:  General: WD, WN. Alert, cooperative, no acute distress    HEENT: NC, Atraumatic. Anicteric sclerae. Lungs:  CTA Bilaterally. No Wheezing/Rhonchi/Rales. Heart:  Regular  rhythm,  No murmur (), No Rubs, No Gallops  Abdomen: Soft, Non distended, Non tender.  +Bowel sounds, no HSM  Extremities: No c/c/e  Neurologic:  Alert and oriented X 3. No acute neurological distress   Psych:   Good insight. Not anxious nor agitated.     Lab and Radiology Data Reviewed: (see below)    Medications Reviewed: (see below)  PMH/SH reviewed - no change compared to H&P  ________________________________________________________________________  Total time spent with patient: 15 minutes ________________________________________________________________________  Care Plan discussed with:  Patient x   Family     RN               Hospitalist:       Mami Servin MD     Procedures: see electronic medical records for all procedures/Xrays and details which were not copied into this note but were reviewed prior to creation of Plan. LABS:  Recent Labs      02/26/17 0413 02/25/17 1858 02/25/17 0404 02/24/17 0430   WBC   --    --    --   3.0*   --   4.7   HGB  8.2*  8.0*   < >  7.4*   < >  7.6*   HCT  24.8*  23.5*   < >  22.2*   < >  22.9*   PLT   --    --    --   147*   --   176    < > = values in this interval not displayed. Recent Labs      02/25/17 0404 02/24/17 0430 02/23/17 2240   NA  142  137  136   K  3.5  3.3*  3.5   CL  110*  102  99   CO2  24  26  27   BUN  9  15  14   CREA  0.36*  0.70  1.11*   GLU  87  119*  125*   CA  7.5*  7.3*  8.0*     Recent Labs      02/23/17 2240   SGOT  29   AP  90   TP  6.4   ALB  2.9*   GLOB  3.5     Recent Labs      02/23/17 2240   INR  1.1   PTP  10.9      Recent Labs      02/24/17 0430   FE  16*   TIBC  185*   PSAT  9*   FERR  115      No results found for: FOL, RBCF  No results for input(s): PH, PCO2, PO2 in the last 72 hours. No results for input(s): CPK, CKMB in the last 72 hours.     No lab exists for component: TROPONINI  Lab Results   Component Value Date/Time    Color YELLOW/STRAW 02/24/2017 02:39 AM    Appearance CLOUDY 02/24/2017 02:39 AM    Specific gravity 1.007 02/24/2017 02:39 AM    pH (UA) 6.0 02/24/2017 02:39 AM    Protein NEGATIVE  02/24/2017 02:39 AM    Glucose NEGATIVE  02/24/2017 02:39 AM    Ketone NEGATIVE  02/24/2017 02:39 AM    Bilirubin NEGATIVE  02/24/2017 02:39 AM    Urobilinogen 0.2 02/24/2017 02:39 AM    Nitrites NEGATIVE  02/24/2017 02:39 AM    Leukocyte Esterase MODERATE 02/24/2017 02:39 AM    Epithelial cells FEW 02/24/2017 02:39 AM    Bacteria 2+ 02/24/2017 02:39 AM    WBC 5-10 02/24/2017 02:39 AM    RBC 0-5 02/24/2017 02:39 AM       MEDICATIONS:  Current Facility-Administered Medications   Medication Dose Route Frequency    acetaminophen (TYLENOL) tablet 325 mg  325 mg Oral Q4H PRN    cefUROXime (CEFTIN) tablet 250 mg  250 mg Oral DAILY    clonazePAM (KlonoPIN) tablet 0.5 mg  0.5 mg Oral TID    docusate sodium (COLACE) capsule 100 mg  100 mg Oral BID    folic acid (FOLVITE) tablet 1 mg  1 mg Oral DAILY    HYDROcodone-acetaminophen (NORCO) 5-325 mg per tablet 1 Tab  1 Tab Oral Q6H PRN    methotrexate (RHEUMATREX) tablet 15 mg  15 mg Oral every Sunday    metoprolol tartrate (LOPRESSOR) tablet 25 mg  25 mg Oral BID    mirtazapine (REMERON) tablet 45 mg  45 mg Oral QHS    OLANZapine (ZyPREXA) tablet 10 mg  10 mg Oral QHS    artificial tears (dextran 70-hypromellose) (NATURAL BALANCE) 0.1-0.3 % ophthalmic solution 1 Drop  1 Drop Both Eyes PRN    prednisoLONE acetate (PRED FORTE) 1 % ophthalmic suspension 1 Drop  1 Drop Left Eye TID    traZODone (DESYREL) tablet 150 mg  150 mg Oral QHS    venlafaxine-SR (EFFEXOR-XR) capsule 150 mg  150 mg Oral DAILY    predniSONE (DELTASONE) tablet 40 mg  40 mg Oral DAILY WITH BREAKFAST    sodium chloride (NS) flush 5-10 mL  5-10 mL IntraVENous Q8H    sodium chloride (NS) flush 5-10 mL  5-10 mL IntraVENous PRN    ondansetron (ZOFRAN) injection 4 mg  4 mg IntraVENous Q4H PRN    pantoprazole (PROTONIX) tablet 40 mg  40 mg Oral ACB    0.9% sodium chloride infusion 250 mL  250 mL IntraVENous PRN    melatonin tablet 6 mg  6 mg Oral QHS PRN

## 2017-02-26 NOTE — PROGRESS NOTES
Bedside am report given to Mariposa Crook RN am HGB 8.2 pt co a lot of pain during night had to call for xtra pain med

## 2017-02-26 NOTE — PROGRESS NOTES
1933 Shift change report received from off going nurse. Report given with Kardex, Intake/Output, MAR and Recent Results. Blood pressure 106/56, pulse 98, temperature 98.1 °F (36.7 °C), resp. rate 18, height 5' 2\" (1.575 m), weight 79.8 kg (176 lb), SpO2 97 %, not currently breastfeeding. Resting in bed. Visitor at bedside. She request sleeping aid as soon as she can get it. 2306 Shift change report given to on coming nurse. Report given with Kardex, Intake/Output, MAR and Recent Results. Blood pressure 123/68, pulse (!) 105, temperature 98.1 °F (36.7 °C), resp. rate 18, height 5' 2\" (1.575 m), weight 79.8 kg (176 lb), SpO2 99 %, not currently breastfeeding. Patient is resting in bed at this time.

## 2017-02-26 NOTE — PROGRESS NOTES
* No surgery found *  Bedside shift change report given to Delisa Pineda RN (oncoming nurse) by David Robertson RN (offgoing nurse). Report included the following information SBAR, Kardex, ED Summary, Intake/Output, MAR and Recent Results.      Zone Phone: [phone]       Significant changes during shift: Purewick placed at 1730. Hgb 9.1. Hct 27.3. H&H labs discontinued. Fluids discontinued.               Patient Information      Elaine Mary  76 y.o.  2/23/2017 9:30 PM by Kristy Gilliland MD. Elaine Livingston was admitted from [facility]  Problem List              Patient Active Problem List      Diagnosis Date Noted    Syncope 02/24/2017               Past Medical History:   Diagnosis Date    Baker's cyst of knee       Depression       Hypertension       RA (rheumatoid arthritis) ([facility] [address]                  Core Measures:      CVA: No No  CHF:No No  PNA:No No      Post Op Surgical (If Applicable):       n/a      Activity Status:      OOB to Chair No  Ambulated this shift No   Bed Rest Yes      Supplemental O2: (If Applicable)      n/a          LINES AND DRAINS:      peripheral right wrist 20G      DVT prophylaxis:      DVT prophylaxis Med- No  DVT prophylaxis SCD or MARISSA- YES      Wounds: (If Applicable)      Wounds- No      Location -      Patient Safety:      Falls Score Total Score: 3  Safety Level_______  Bed Alarm On? No  Sitter?  No      Plan for upcoming shift: Consult with PT              Discharge Plan: Yes when stable      Active Consults: PT Consult

## 2017-02-26 NOTE — PROGRESS NOTES
Problem: Falls - Risk of  Goal: *Absence of falls  Outcome: Progressing Towards Goal  No Falls this shift.

## 2017-02-27 LAB
ERYTHROCYTE [DISTWIDTH] IN BLOOD BY AUTOMATED COUNT: 14.2 % (ref 11.5–14.5)
HCT VFR BLD AUTO: 23.3 % (ref 35–47)
HGB BLD-MCNC: 7.5 G/DL (ref 11.5–16)
MCH RBC QN AUTO: 30.4 PG (ref 26–34)
MCHC RBC AUTO-ENTMCNC: 32.2 G/DL (ref 30–36.5)
MCV RBC AUTO: 94.3 FL (ref 80–99)
PLATELET # BLD AUTO: 167 K/UL (ref 150–400)
RBC # BLD AUTO: 2.47 M/UL (ref 3.8–5.2)
WBC # BLD AUTO: 3 K/UL (ref 3.6–11)

## 2017-02-27 PROCEDURE — 74011250637 HC RX REV CODE- 250/637: Performed by: INTERNAL MEDICINE

## 2017-02-27 PROCEDURE — 65660000000 HC RM CCU STEPDOWN

## 2017-02-27 PROCEDURE — G8978 MOBILITY CURRENT STATUS: HCPCS

## 2017-02-27 PROCEDURE — 74011636637 HC RX REV CODE- 636/637: Performed by: INTERNAL MEDICINE

## 2017-02-27 PROCEDURE — G8988 SELF CARE GOAL STATUS: HCPCS | Performed by: OCCUPATIONAL THERAPIST

## 2017-02-27 PROCEDURE — 74011000250 HC RX REV CODE- 250: Performed by: SPECIALIST

## 2017-02-27 PROCEDURE — G8987 SELF CARE CURRENT STATUS: HCPCS | Performed by: OCCUPATIONAL THERAPIST

## 2017-02-27 PROCEDURE — 97530 THERAPEUTIC ACTIVITIES: CPT | Performed by: OCCUPATIONAL THERAPIST

## 2017-02-27 PROCEDURE — 97161 PT EVAL LOW COMPLEX 20 MIN: CPT

## 2017-02-27 PROCEDURE — 97165 OT EVAL LOW COMPLEX 30 MIN: CPT | Performed by: OCCUPATIONAL THERAPIST

## 2017-02-27 PROCEDURE — 97530 THERAPEUTIC ACTIVITIES: CPT

## 2017-02-27 PROCEDURE — 85027 COMPLETE CBC AUTOMATED: CPT | Performed by: INTERNAL MEDICINE

## 2017-02-27 PROCEDURE — G8979 MOBILITY GOAL STATUS: HCPCS

## 2017-02-27 RX ORDER — HYDROCODONE BITARTRATE AND ACETAMINOPHEN 5; 325 MG/1; MG/1
1 TABLET ORAL
Status: DISCONTINUED | OUTPATIENT
Start: 2017-02-27 | End: 2017-03-01 | Stop reason: HOSPADM

## 2017-02-27 RX ADMIN — METOPROLOL TARTRATE 25 MG: 25 TABLET ORAL at 17:46

## 2017-02-27 RX ADMIN — CLONAZEPAM 0.5 MG: 0.5 TABLET ORAL at 08:14

## 2017-02-27 RX ADMIN — DOCUSATE SODIUM 100 MG: 100 CAPSULE, LIQUID FILLED ORAL at 17:46

## 2017-02-27 RX ADMIN — PREDNISONE 40 MG: 10 TABLET ORAL at 08:14

## 2017-02-27 RX ADMIN — Medication 10 ML: at 21:24

## 2017-02-27 RX ADMIN — PREDNISOLONE ACETATE 1 DROP: 10 SUSPENSION/ DROPS OPHTHALMIC at 15:36

## 2017-02-27 RX ADMIN — HYDROCODONE BITARTRATE AND ACETAMINOPHEN 1 TABLET: 5; 325 TABLET ORAL at 16:28

## 2017-02-27 RX ADMIN — CLONAZEPAM 0.5 MG: 0.5 TABLET ORAL at 21:24

## 2017-02-27 RX ADMIN — MIRTAZAPINE 45 MG: 15 TABLET, FILM COATED ORAL at 21:24

## 2017-02-27 RX ADMIN — PREDNISOLONE ACETATE 1 DROP: 10 SUSPENSION/ DROPS OPHTHALMIC at 08:18

## 2017-02-27 RX ADMIN — MELATONIN 3 MG ORAL TABLET 6 MG: 3 TABLET ORAL at 21:29

## 2017-02-27 RX ADMIN — OLANZAPINE 10 MG: 10 TABLET, FILM COATED ORAL at 21:24

## 2017-02-27 RX ADMIN — PREDNISOLONE ACETATE 1 DROP: 10 SUSPENSION/ DROPS OPHTHALMIC at 21:23

## 2017-02-27 RX ADMIN — FOLIC ACID 1 MG: 1 TABLET ORAL at 08:14

## 2017-02-27 RX ADMIN — PANTOPRAZOLE SODIUM 40 MG: 40 TABLET, DELAYED RELEASE ORAL at 08:14

## 2017-02-27 RX ADMIN — METOPROLOL TARTRATE 25 MG: 25 TABLET ORAL at 08:14

## 2017-02-27 RX ADMIN — HYDROCODONE BITARTRATE AND ACETAMINOPHEN 1 TABLET: 5; 325 TABLET ORAL at 00:23

## 2017-02-27 RX ADMIN — CLONAZEPAM 0.5 MG: 0.5 TABLET ORAL at 15:36

## 2017-02-27 RX ADMIN — Medication 10 ML: at 13:36

## 2017-02-27 RX ADMIN — Medication 10 ML: at 06:00

## 2017-02-27 RX ADMIN — DOCUSATE SODIUM 100 MG: 100 CAPSULE, LIQUID FILLED ORAL at 08:14

## 2017-02-27 RX ADMIN — POLYETHYLENE GLYCOL 3350, SODIUM SULFATE ANHYDROUS, SODIUM BICARBONATE, SODIUM CHLORIDE, POTASSIUM CHLORIDE 4 L: 236; 22.74; 6.74; 5.86; 2.97 POWDER, FOR SOLUTION ORAL at 16:29

## 2017-02-27 RX ADMIN — TRAZODONE HYDROCHLORIDE 150 MG: 100 TABLET ORAL at 21:24

## 2017-02-27 RX ADMIN — CEFUROXIME AXETIL 250 MG: 250 TABLET ORAL at 08:18

## 2017-02-27 RX ADMIN — VENLAFAXINE HYDROCHLORIDE 150 MG: 150 CAPSULE, EXTENDED RELEASE ORAL at 08:14

## 2017-02-27 RX ADMIN — HYDROCODONE BITARTRATE AND ACETAMINOPHEN 1 TABLET: 5; 325 TABLET ORAL at 23:54

## 2017-02-27 RX ADMIN — HYDROCODONE BITARTRATE AND ACETAMINOPHEN 1 TABLET: 5; 325 TABLET ORAL at 08:14

## 2017-02-27 NOTE — PROGRESS NOTES
[]Hide copied text             Bedside shift change report given to LOBO Perry (oncoming nurse) by Allen Fox RN (offgoing nurse). Report included the following information SBAR, Kardex, ED Summary, Intake/Output, MAR and Recent Results.      Zone Phone: [phone]       Significant changes during shift:  clear liquid diet      Patient Information      Elaine Mary  76 y.o.  2/23/2017 9:30 PM by Tasia Espana MD. Elaine Tello was admitted from [facility]  Problem List                 Patient Active Problem List      Diagnosis Date Noted    Syncope 02/24/2017                  Past Medical History:   Diagnosis Date    Baker's cyst of knee       Depression       Hypertension       RA (rheumatoid arthritis) ([facility] [address]                  Core Measures:      CVA: No No  CHF:No No  PNA:No No      Post Op Surgical (If Applicable):       n/a      Activity Status:      OOB to Chair No  Ambulated this shift No   Bed Rest Yes      Supplemental O2: (If Applicable)      n/a          LINES AND DRAINS:      peripheral right wrist 20G      DVT prophylaxis:      DVT prophylaxis Med- No  DVT prophylaxis SCD or MARISSA- YES      Wounds: (If Applicable)      Wounds- No      Location -      Patient Safety:      Falls Score Total Score: 3  Safety Level___III____  Bed Alarm On? No  Sitter?  No      Plan for upcoming shift: consent for EGD/Colonoscopy            Discharge Plan: Yes when stable      Active Consults: PT Consult

## 2017-02-27 NOTE — PROGRESS NOTES
Hospitalist Progress Note    NAME: Elaine Mary   :  1940   MRN:  [id number]       Interim Hospital Summary: 78 y.o. female whom presented on 2017 with      Assessment / Plan:  Iron Deficiency Anemia  Syncope  - orthostatic hypotension likely from anemia. -s/p 1 unit prbc  -H/H down to 7.5. Discussed with GI. She will be prep for scope in the AM.  -GI following      RA  -continue prednisone 40 mg daily  -humira twice a month, methotrexate weekly     Recurrent UTI  -will continue ceftin at ppx dose  -f/u urine culture, UA with 5-10 WBC 2+bacteria, patient asymptomatic     Decondition  -up in chair  -PT/OT  -likely back to [place] in the AM    Pt has hx of prescription abuse per daughter and will often ask for more pain medications for sciatica per daughter. Code Status: DNR - discussed with patient at bedside  Surrogate Decision Maker: daughter. Discussed plans and treatment with daughter, Janet.     DVT Prophylaxis: SCD  GI Prophylaxis: not indicated     Baseline: staying adelfo currently, uses walker             Subjective:     Chief Complaint / Reason for Physician Visit  No acute complaints. Discussed with RN events overnight. Review of Systems:  Symptom Y/N Comments  Symptom Y/N Comments   Fever/Chills n   Chest Pain n    Poor Appetite n   Edema n    Cough n   Abdominal Pain n    Sputum n   Joint Pain n    SOB/MADERA    Pruritis/Rash     Nausea/vomit    Tolerating PT/OT     Diarrhea    Tolerating Diet     Constipation    Other x Sciatica pain     Could NOT obtain due to:      Objective:     VITALS:   Last 24hrs VS reviewed since prior progress note.  Most recent are:  Patient Vitals for the past 24 hrs:   Temp Pulse Resp BP SpO2   17 0721 98.5 °F (36.9 °C) (!) 103 16 156/79 93 %   17 0253 98.4 °F (36.9 °C) 99 18 125/58 95 %   17 0018 97.6 °F (36.4 °C) 99 18 141/88 95 %   17 1930 98.7 °F (37.1 °C) (!) 106 20 121/59 95 %   17 1543 99.1 °F (37.3 °C) 100 16 94/66 99 %   02/26/17 1132 97.8 °F (36.6 °C) 82 16 121/59 95 %     No intake or output data in the 24 hours ending 02/27/17 0819     PHYSICAL EXAM:  General: WD, WN. Alert, cooperative, no acute distress    EENT:  EOMI. Anicteric sclerae. MMM  Resp:  CTA bilaterally, no wheezing or rales. No accessory muscle use  CV:  Regular  rhythm,  No edema  GI:  Soft, Non distended, Non tender.  +Bowel sounds  Neurologic:  Alert and oriented X 3, normal speech,   Psych:   Good insight. Not anxious nor agitated  Skin:  No rashes. No jaundice    Reviewed most current lab test results and cultures  YES  Reviewed most current radiology test results   YES  Review and summation of old records today    NO  Reviewed patient's current orders and MAR    YES  PMH/ reviewed - no change compared to H&P  ________________________________________________________________________  Care Plan discussed with:    Comments   Patient x    Family  x daughter   RN x    Care Manager     Consultant                        Multidiciplinary team rounds were held today with , nursing, pharmacist and clinical coordinator. Patient's plan of care was discussed; medications were reviewed and discharge planning was addressed. ________________________________________________________________________  Total NON critical care TIME:  35   Minutes  Total CRITICAL CARE TIME Spent:   Minutes non procedure based      Comments   >50% of visit spent in counseling and coordination of care     ________________________________________________________________________  Ace Murray MD     Procedures: see electronic medical records for all procedures/Xrays and details which were not copied into this note but were reviewed prior to creation of Plan. LABS:  I reviewed today's most current labs and imaging studies.   Pertinent labs include:  Recent Labs      02/27/17   0304  02/26/17   1215  02/26/17   0413   02/25/17   0404   WBC  3.0*   --    --    --   3.0* HGB  7.5*  9.1*  8.2*   < >  7.4*   HCT  23.3*  27.3*  24.8*   < >  22.2*   PLT  167   --    --    --   147*    < > = values in this interval not displayed.      Recent Labs      02/25/17   0404   NA  142   K  3.5   CL  110*   CO2  24   GLU  87   BUN  9   CREA  0.36*   CA  7.5*       Signed: Gabe Spears MD

## 2017-02-27 NOTE — PROGRESS NOTES
Pressure Ulcer Prevention In basket Alert Received for Sage < 14 (moderate risk).      Suggested Care Plan/Interventions for Nursing  1. Complete Sage Pressure Ulcer Risk Scale and use sub scores to identify appropriate interventions. 2. Perform Assessment: skin, changes in LOC, visual cues for pain, monitor skin under medical devices  3. Respond to Reduced Sensory Perception: changes in LOC, check visual cues for pain, float heels, suspension boots, pressure redistribution bed/mattress/chair cushion, turning and reposition approximately every 2 hours (pillows & wedges), pad between skin to skin, turn & reposition  4. Manage Moisture: absorbent under pads, internal / external urinary device, internal /  external fecal device, minimize layers, contain wound drainage, access need for specialty bed, limit adult briefs, maintain skin hydration (lotion/cream), moisture barrier, offer toileting every hour  5. Promote Activity: increase time out of bed, chair cushion, PT/OT evaluation, trapeze to reposition, pressure redistribution bed/mattress/chair  6. Address Reduced Mobility: float heels / suspension boot, HOB 30 degrees or less, pressure redistribution bed/mattress/cushion, PT / OT evaluation, turn and reposition approximately every 2 hours (pillows & wedges)  7. Promote Nutrition: document food / fluid / supplement intake, encourage/assist with meals as needed  8. Reduce Friction and Shear: transferring/repositioning devices (lift/draw sheet), lift team/ patient mobility team, feet elevated on foot rest, minimize layers, foam dressing / transparent film / skin sealants, protective barrier creams and emollients, transfer aides (board, Simran lift, ceiling lift, stand assist), HOB 30 degrees or less, trapeze to reposition.   Wound Care Team

## 2017-02-27 NOTE — PROGRESS NOTES
Previous CM sent referral to [facility] via [employer] and they have accepted. CM will continue to follow and assist with additional discharge needs.        JOVANNY Tejeda  Ext [phone]

## 2017-02-27 NOTE — PROGRESS NOTES
Occupational Therapy Goals  Initiated 2/27/2017  1. Patient will perform grooming standing at sink with supervision/set-up within 7 day(s). 2.  Patient will perform upper body dressing with supervision/set-up within 7 day(s). 3.  Patient will perform lower body dressing with supervision/set-up within 7 day(s). 4.  Patient will perform toilet transfers with supervision/set-up within 7 day(s). 5.  Patient will perform all aspects of toileting with supervision/set-up within 7 day(s). 6.  Patient will perform sponge bathing with supervision/set-up within 7 day(s). Occupational Therapy EVALUATION  Patient: Deanna Newell (78 y.o. female)  Date: 2/27/2017  Primary Diagnosis: Syncope  gi bleed  Procedure(s) (LRB):  ESOPHAGOGASTRODUODENOSCOPY (EGD) (N/A)  COLONOSCOPY (N/A)     Precautions: falls       ASSESSMENT :  Based on the objective data described below, the patient presents with GW, decreased activity tolerance, decreased safety awareness and decreased balance which is impairing her functional independence. Patient did experience a 41 point drop in her BP from sitting to standing, with c/o dizziness. She is functioning below her supervision/setup functional baseline, now performing ADLs at a supervision/setup to mod A level, and is supervision to min A for functional mobility. Patient will benefit from skilled intervention to address the above impairments.   Patients rehabilitation potential is considered to be Good  Factors which may influence rehabilitation potential include:   []             None noted  []             Mental ability/status  [x]             Medical condition  []             Home/family situation and support systems  []             Safety awareness  []             Pain tolerance/management  []             Other:      PLAN :  Recommendations and Planned Interventions:  [x]               Self Care Training                  []        Therapeutic Activities  [x]               Functional Mobility Training    []        Cognitive Retraining  []               Therapeutic Exercises           [x]        Endurance Activities  [x]               Balance Training                   []        Neuromuscular Re-Education  []               Visual/Perceptual Training     [x]   Home Safety Training  [x]               Patient Education                 [x]        Family Training/Education  []               Other (comment):    Frequency/Duration: Patient will be followed by occupational therapy 4 times a week to address goals. Discharge Recommendations: [facility]  Further Equipment Recommendations for Discharge: TBD         OBJECTIVE DATA SUMMARY:     Past Medical History:   Diagnosis Date    Baker's cyst of knee     Depression     Hypertension     RA (rhe[facility]oid arthritis) ([facility] [address]      Past Surgical History:   Procedure Laterality Date    HX HERNIA REPAIR       Prior Level of Function/Home Situation: resides in LTC, performs ADLs at a supervision/setup level and ambulates with a RW. Patient has significant RA in B hands causing him to require assistance for management of clothing fasteners and containers. He also receives assistance for socks and shoes.    Expanded or extensive additional review of patient history:     Home Situation  Home Environment: Independent living  # Steps to Enter: 0  Wheelchair Ramp: Yes  One/Two Story Residence: Other (Comment)  Living Alone: No  Support Systems: Family member(s)  Patient Expects to be Discharged to[de-identified] Assisted living  Current DME Used/Available at Home: Walker, rolling, Shower chair  [x]  Right hand dominant   []  Left hand dominant  Cognitive/Behavioral Status:  Neurologic State: Alert  Orientation Level: Oriented X4  Cognition: Follows commands             Vision/Perceptual:    Acuity: Within Defined Limits (R eye, grossly impaired non-functional L eye)       Range of Motion:  AROM: Generally decreased, functional    With the exception of bilateral hands being greatly impaired for management of containers and clothing fasteners, 2/2 RA. Strength:  Strength: Generally decreased, functional  Coordination:  Coordination: Within functional limits     With the exception of bilateral hands being greatly impaired for management of containers and clothing fasteners, 2/2 RA. Tone & Sensation:  Tone: Normal  Balance:  Sitting: Intact  Standing: Intact; With support  Functional Mobility and Transfers for ADLs:  Bed Mobility:  Rolling: Supervision  Supine to Sit: Supervision  Sit to Supine: Minimum assistance (LE management)  Scooting: Supervision  Transfers:  Sit to Stand: Contact guard assistance     Bed to Chair: Contact guard assistance                         ADL Assessment:  Feeding: Supervision;Setup    Oral Facial Hygiene/Grooming: Contact guard assistance    Bathing: Minimum assistance    Upper Body Dressing: Minimum assistance    Lower Body Dressing: Moderate assistance    Toileting: Moderate assistance                Functional Measure:  Barthel Index:    Bathin  Bladder: 0  Bowels: 5  Groomin  Dressin  Feedin  Mobility: 0  Stairs: 0  Toilet Use: 0  Transfer (Bed to Chair and Back): 10  Total: 20       Barthel and G-code impairment scale:  Percentage of impairment CH  0% CI  1-19% CJ  20-39% CK  40-59% CL  60-79% CM  80-99% CN  100%   Barthel Score 0-100 100 99-80 79-60 59-40 20-39 1-19   0   Barthel Score 0-20 20 17-19 13-16 9-12 5-8 1-4 0      The Barthel ADL Index: Guidelines  1. The index should be used as a record of what a patient does, not as a record of what a patient could do. 2. The main aim is to establish degree of independence from any help, physical or verbal, however minor and for whatever reason. 3. The need for supervision renders the patient not independent. 4. A patient's performance should be established using the best available evidence.  Asking the patient, friends/relatives and nurses are the usual sources, but direct observation and common sense are also important. However direct testing is not needed. 5. Usually the patient's performance over the preceding 24-48 hours is important, but occasionally longer periods will be relevant. 6. Middle categories imply that the patient supplies over 50 per cent of the effort. 7. Use of aids to be independent is allowed. Trudi Bermeo., Barthel, D.W. (1877). Functional evaluation: the Barthel Index. [address] (14)2. Michele Matias tamy FEMI Nergo, Coby Sutherland., Liliana Zuniga., [facility], [facility] (1999). Measuring the change indisability after inpatient rehabilitation; comparison of the responsiveness of the Barthel Index and Functional Union Measure. Journal of Neurology, Neurosurgery, and Psychiatry, 66(4), 535-889. Soni Resendez, N.J.A, BRYCE Celis, & Chi Trevizo, MBlazeA. (2004.) Assessment of post-stroke quality of life in cost-effectiveness studies: The usefulness of the Barthel Index and the EuroQoL-5D. Quality of Life Research, 13, 427-43       In compliance with CMSs Claims Based Outcome Reporting, the following G-code set was chosen for this patient based on their primary functional limitation being treated: The outcome measure chosen to determine the severity of the functional limitation was the Barthel Index with a score of 20/100 which was correlated with the impairment scale. ?  Self Care:     - CURRENT STATUS: CL - 60%-79% impaired, limited or restricted    - GOAL STATUS:  CK - 40%-59% impaired, limited or restricted    - D/C STATUS:  ---------------To be determined---------------       Occupational Therapy Evaluation Charge Determination   History Examination Decision-Making   LOW Complexity : Brief history review  LOW Complexity : 1-3 performance deficits relating to physical, cognitive , or psychosocial skils that result in activity limitations and / or participation restrictions  LOW Complexity : No comorbidities that affect functional and no verbal or physical assistance needed to complete eval tasks       Based on the above components, the patient evaluation is determined to be of the following complexity level: LOW       ADL Intervention and task modifications:  Initiated bed mobility, dressing ADL, transfer and safety training. Pain:  Pain Scale 1: Numeric (0 - 10)  Pain Intensity 1: 7  Pain Location 1: Hip;Leg  Pain Orientation 1: Left  Pain Description 1: Aching  Pain Intervention(s) 1: Medication (see MAR)    After treatment:   [x] Patient left in no apparent distress sitting up in chair  [] Patient left in no apparent distress in bed  [x] Call bell left within reach  [x] Nursing notified  [] Caregiver present  [] Bed alarm activated    COMMUNICATION/EDUCATION:   The patients plan of care was discussed with: Physical Therapist.  [x] Home safety education was provided and the patient/caregiver indicated understanding. [x] Patient/family have participated as able in goal setting and plan of care. [x] Patient/family agree to work toward stated goals and plan of care. [] Patient understands intent and goals of therapy, but is neutral about his/her participation. [] Patient is unable to participate in goal setting and plan of care. This patients plan of care is appropriate for delegation to [facility].     Thank you for this referral.  Jd Parra, OTR/L  Time Calculation: 23 mins

## 2017-02-27 NOTE — PROGRESS NOTES
[]Hide copied text           * No surgery found *  Bedside shift change report given to Slade Perez RN (oncoming nurse) by Doc Garcia RN (offgoing nurse). Report included the following information SBAR, Kardex, ED Summary, Intake/Output, MAR and Recent Results.      [facility] Phone: [phone]       Significant changes during shift:   REQUEST NORCO EVERY 6 HOURS PRN LEFT HIP PAIN. [place]      Patient Information      Elaine Mary  76 y.o.  2/23/2017 9:30 PM by Luis Young MD. Ealine Vazquez was admitted from [facility]  Problem List                 Patient Active Problem List      Diagnosis Date Noted    Syncope 02/24/2017                  Past Medical History:   Diagnosis Date    Baker's cyst of knee       Depression       Hypertension       RA (rheumatoid arthritis) ([facility] [address]                  Core Measures:      CVA: No No  CHF:No No  PNA:No No      Post Op Surgical (If Applicable):       n/a      Activity Status:      OOB to Chair No  Ambulated this shift No   Bed Rest Yes      Supplemental O2: (If Applicable)      n/a          LINES AND DRAINS:      peripheral right wrist 20G      DVT prophylaxis:      DVT prophylaxis Med- No  DVT prophylaxis SCD or MARISSA- YES      Wounds: (If Applicable)      Wounds- No      Location -      Patient Safety:      Falls Score Total Score: 3  Safety Level_______  Bed Alarm On? No  Sitter? No      Plan for upcoming shift: Consult with PT,UP IN CHAIR, MONITOR H&H. WHEN H&H STABLE WILL D/C TO [facility] AND DO OUT PATIENT CSCOPE.  GI IS FOLLOWING, CONTINUE PREDNISONE, CEFTIN              Discharge Plan: Yes when stable      Active Consults: PT Consult

## 2017-02-27 NOTE — PROGRESS NOTES
Gastroenterology Daily Progress Note (Dr. Nolan Arana)    Admit Date: 2/23/2017       Subjective:       Patient is awake and alert this am.  Her only c/o is of a cough. No bloody stools reported. Hgb 7.5 today.     Current Facility-Administered Medications   Medication Dose Route Frequency    acetaminophen (TYLENOL) tablet 325 mg  325 mg Oral Q4H PRN    cefUROXime (CEFTIN) tablet 250 mg  250 mg Oral DAILY    clonazePAM (KlonoPIN) tablet 0.5 mg  0.5 mg Oral TID    docusate sodium (COLACE) capsule 100 mg  100 mg Oral BID    folic acid (FOLVITE) tablet 1 mg  1 mg Oral DAILY    HYDROcodone-acetaminophen (NORCO) 5-325 mg per tablet 1 Tab  1 Tab Oral Q6H PRN    methotrexate (RHEUMATREX) tablet 15 mg  15 mg Oral every Sunday    metoprolol tartrate (LOPRESSOR) tablet 25 mg  25 mg Oral BID    mirtazapine (REMERON) tablet 45 mg  45 mg Oral QHS    OLANZapine (ZyPREXA) tablet 10 mg  10 mg Oral QHS    artificial tears (dextran 70-hypromellose) (NATURAL BALANCE) 0.1-0.3 % ophthalmic solution 1 Drop  1 Drop Both Eyes PRN    prednisoLONE acetate (PRED FORTE) 1 % ophthalmic suspension 1 Drop  1 Drop Left Eye TID    traZODone (DESYREL) tablet 150 mg  150 mg Oral QHS    venlafaxine-SR (EFFEXOR-XR) capsule 150 mg  150 mg Oral DAILY    predniSONE (DELTASONE) tablet 40 mg  40 mg Oral DAILY WITH BREAKFAST    sodium chloride (NS) flush 5-10 mL  5-10 mL IntraVENous Q8H    sodium chloride (NS) flush 5-10 mL  5-10 mL IntraVENous PRN    ondansetron (ZOFRAN) injection 4 mg  4 mg IntraVENous Q4H PRN    pantoprazole (PROTONIX) tablet 40 mg  40 mg Oral ACB    0.9% sodium chloride infusion 250 mL  250 mL IntraVENous PRN    melatonin tablet 6 mg  6 mg Oral QHS PRN        Objective:     Visit Vitals    /79 (BP 1 Location: Right arm, BP Patient Position: At rest)    Pulse (!) 103    Temp 98.5 °F (36.9 °C)    Resp 16    Ht 5' 2\" (1.575 m)    Wt 79.8 kg (176 lb)    SpO2 93%    Breastfeeding No    BMI 32.19 kg/m2 Blood pressure 156/79, pulse (!) 103, temperature 98.5 °F (36.9 °C), resp. rate 16, height 5' 2\" (1.575 m), weight 79.8 kg (176 lb), SpO2 93 %, not currently breastfeeding. 02/25 1901 - 02/27 0700  In: 1055 [I.V.:1055]  Out: 1050 [Urine:1050]    Physical Exam:     General:  Chest:  CTA, No rhonchi, rales or rubs. Heart: S1, S2, RRR  GI: Soft, NT, ND + bowel sounds    Labs:      Ref. Range 2/25/2017 18:58 2/26/2017 04:13 2/26/2017 12:15 2/27/2017 03:04   HGB Latest Ref Range: 11.5 - 16.0 g/dL 8.0 (L) 8.2 (L) 9.1 (L) 7.5 (L)   HCT Latest Ref Range: 35.0 - 47.0 % 23.5 (L) 24.8 (L) 27.3 (L) 23.3 (L)     Recent Results (from the past 24 hour(s))   HGB & HCT    Collection Time: 02/26/17 12:15 PM   Result Value Ref Range    HGB 9.1 (L) 11.5 - 16.0 g/dL    HCT 27.3 (L) 35.0 - 47.0 %   CBC W/O DIFF    Collection Time: 02/27/17  3:04 AM   Result Value Ref Range    WBC 3.0 (L) 3.6 - 11.0 K/uL    RBC 2.47 (L) 3.80 - 5.20 M/uL    HGB 7.5 (L) 11.5 - 16.0 g/dL    HCT 23.3 (L) 35.0 - 47.0 %    MCV 94.3 80.0 - 99.0 FL    MCH 30.4 26.0 - 34.0 PG    MCHC 32.2 30.0 - 36.5 g/dL    RDW 14.2 11.5 - 14.5 %    PLATELET 637 971 - 950 K/uL     Recent Labs      02/25/17   0404   NA  142   K  3.5   CL  110*   CO2  24   BUN  9   CREA  0.36*   GLU  87   CA  7.5*       Impression:    Iron Deficiency Anemia  Occult blood in stool r/o GI blood loss anemia  Syncope  RA         Plan:  Patient with heme + stool and drop in Hgb again without overt GI bleeding. Will proceed with GI evaluation therefore and plan for EGD and Colonoscopy tomorrow. Prep to be ordered for this pm and stay on clears today and NPO after MN. Pt agreeable with plan and d/w Dr. Kimber Peacock as well.          Lilliam Neville MD    2/27/2017 [address] [facility], [address] [facility]  [phone] [id number]  [address] [place] [address] [place]: [phone]

## 2017-02-27 NOTE — PROGRESS NOTES
Problem: Mobility Impaired (Adult and Pediatric)  Goal: *Acute Goals and Plan of Care (Insert Text)  Physical Therapy Goals  Initiated 2/27/2017  1. Patient will move from supine to sit and sit to supine , scoot up and down and roll side to side in bed with modified independence within 7 day(s). 2. Patient will transfer from bed to chair and chair to bed with modified independence using the least restrictive device within 7 day(s). 3. Patient will perform sit to stand with modified independence within 7 day(s). 4. Patient will ambulate with modified independence for 200 feet with the least restrictive device within 7 day(s). PHYSICAL THERAPY EVALUATION  Patient: Elaine Arreguin (78 y.o. female)  Date: 2/27/2017  Primary Diagnosis: Syncope  gi bleed  Procedure(s) (LRB):  ESOPHAGOGASTRODUODENOSCOPY (EGD) (N/A)  COLONOSCOPY (N/A)     Precautions: Falls, Syncope         ASSESSMENT :  Based on the objective data described below, the patient presents with generalized weakness, decreased activity tolerance and functional mobility from baseline level of functioning. Pt resides at [address] [place] [address] where she reports is Mod I for all mobility and receives some assistance w/ ADLs. Pt admitted for syncopal episode resulting in fall w/ pt reporting no known injuries. Pt continues to experience symptoms of lightheadedness which limited functional mobility during today's session. Pt required Min A for bed mobility 2/2 c/o sciatic pain into LLE. Performed sit to stand w/ CGA, VS assessed and stable throughout, however pt requesting to return to bed 2/2 symptoms and states she is \"unable to walk without slippers due to RA nodules on both feet. \" Pt able to side step x 4 ft w/ RW prior to return to supine. Pt currently functioning below Mod I baseline and would continue to benefit from skilled PT services to improve functional independence.  Upon DC pt to return to LTC facility where she may benefit from further PT services pending progress. Patient will benefit from skilled intervention to address the above impairments. Patients rehabilitation potential is considered to be Fair  Factors which may influence rehabilitation potential include:   [ ]         None noted  [ ]         Mental ability/status  [X]         Medical condition  [ ]         Home/family situation and support systems  [ ]         Safety awareness  [X]         Pain tolerance/management  [ ]         Other:        PLAN :  Recommendations and Planned Interventions:  [X]           Bed Mobility Training             [ ]    Neuromuscular Re-Education  [X]           Transfer Training                   [ ]    Orthotic/Prosthetic Training  [X]           Gait Training                         [ ]    Modalities  [X]           Therapeutic Exercises           [ ]    Edema Management/Control  [X]           Therapeutic Activities            [X]    Patient and Family Training/Education  [ ]           Other (comment):     Frequency/Duration: Patient will be followed by physical therapy  4 times a week to address goals. Discharge Recommendations: Skilled Nursing Facility  Further Equipment Recommendations for Discharge: Pt owns RW. SUBJECTIVE:   Patient stated I can't walk without my slippers.       OBJECTIVE DATA SUMMARY:   HISTORY:    Past Medical History:   Diagnosis Date    Baker's cyst of knee      Depression      Hypertension      RA [facility]rheumatoid arthritis) ([facility] [address]       Past Surgical History:   Procedure Laterality Date    HX HERNIA REPAIR         Prior Level of Function/Home Situation: Lives in [place] [address]; reports ind w/ all mobility, amb w/ RW. Requires assistance for ADLs.    Personal factors and/or comorbidities impacting plan of care:      Home Situation  Home Environment: Independent living  # Steps to Enter: 0  Wheelchair Ramp: Yes  One/Two Story Residence: Other (Comment)  Living Alone: No  Support Systems: Family member(s)  Patient Expects to be Discharged to[de-identified] Assisted living  Current DME Used/Available at Home: [employer], shy, Shower chair     EXAMINATION/PRESENTATION/DECISION MAKING:   Critical Behavior:  Neurologic State: Alert  Orientation Level: Oriented X4  Cognition: Follows commands        Strength:    Strength: Generally decreased, functional                    Tone & Sensation:   Tone: Normal              Sensation: Intact               Range Of Motion:  AROM: Generally decreased, functional                       Coordination:  Coordination: Within functional limits     Functional Mobility:  Bed Mobility:  Rolling: Minimum assistance  Supine to Sit: Minimum assistance (LE management)  Sit to Supine: Minimum assistance (LE management)  Scooting: Stand-by asssistance  Transfers:  Sit to Stand: Contact guard assistance  Stand to Sit: Contact guard assistance                       Balance:   Sitting: Intact  Standing: Intact; With support  Ambulation/Gait Training:  Distance (ft): 4 Feet (ft) (side stepping)  Assistive Device: Gait belt;Walker, rolling  Ambulation - Level of Assistance: Contact guard assistance        Gait Abnormalities: Decreased step clearance                                               Pt able to side step x 4 ft w/ RW; pt requested to get back to bed 2/2 symptoms of lightheadedness despite VSS. Pt also reports cannot walk w/out slippers 2/2 RA nodules of B feet. Functional Measure:  Barthel Index:      Bathin  Bladder: 0  Bowels: 5  Groomin  Dressin  Feedin  Mobility: 0  Stairs: 0  Toilet Use: 5  Transfer (Bed to Chair and Back): 10  Total: 30         Barthel and G-code impairment scale:  Percentage of impairment CH  0% CI  1-19% CJ  20-39% CK  40-59% CL  60-79% CM  80-99% CN  100%   Barthel Score 0-100 100 99-80 79-60 59-40 20-39 1-19    0   Barthel Score 0-20 20 17-19 13-16 9-12 5-8 1-4 0      The Barthel ADL Index: Guidelines  1.  The index should be used as a record of what a patient does, not as a record of what a patient could do. 2. The main aim is to establish degree of independence from any help, physical or verbal, however minor and for whatever reason. 3. The need for supervision renders the patient not independent. 4. A patient's performance should be established using the best available evidence. Asking the patient, friends/relatives and nurses are the usual sources, but direct observation and common sense are also important. However direct testing is not needed. 5. Usually the patient's performance over the preceding 24-48 hours is important, but occasionally longer periods will be relevant. 6. Middle categories imply that the patient supplies over 50 per cent of the effort. 7. Use of aids to be independent is allowed. Drew Madrigal., Barthel, D.W. (9833). Functional evaluation: the Barthel Index. [facility] (14)2. MAC AnneF, Chacon Art., Compton Mcmechen., [place], [address] (1999). Measuring the change indisability after inpatient rehabilitation; comparison of the responsiveness of the Barthel Index and Functional [place] Measure. Journal of Neurology, Neurosurgery, and Psychiatry, 66(4), 760-509. Wang Clark, N.J.A, Lowell Lambert,  W.J.M, & Sandra Colin, M.A. (2004.) Assessment of post-stroke quality of life in cost-effectiveness studies: The usefulness of the Barthel Index and the EuroQoL-5D. Quality of Life Research, 13, 224-62            G codes: In compliance with CMSs Claims Based Outcome Reporting, the following G-code set was chosen for this patient based on their primary functional limitation being treated: The outcome measure chosen to determine the severity of the functional limitation was the Barthel Index with a score of 30/100 which was correlated with the impairment scale.       · Mobility - Walking and Moving Around:               - CURRENT STATUS:    CL - 60%-79% impaired, limited or restricted               - GOAL STATUS: CL - 60%-79% impaired, limited or restricted               - D/C STATUS:                       ---------------To be determined---------------            Pain:  Pain Scale 1: Numeric (0 - 10)  Pain Intensity 1: 7  Pain Location 1: Hip;Leg  Pain Orientation 1: Left  Pain Description 1: Aching  Pain Intervention(s) 1: Medication (see MAR)  Activity Tolerance:   Poor 2/2 symptoms of lightheadedness. VSS throughout session. Please refer to the flowsheet for vital signs taken during this treatment. After treatment:   [ ]         Patient left in no apparent distress sitting up in chair  [X]         Patient left in no apparent distress in bed  [X]         Call bell left within reach  [X]         Nursing notified  [ ]         Caregiver present  [X]         Bed alarm activated      COMMUNICATION/EDUCATION:   The patients plan of care was discussed with: Occupational Therapist, Registered Nurse and . [X]         Fall prevention education was provided and the patient/caregiver indicated understanding. [X]         Patient/family have participated as able in goal setting and plan of care. [X]         Patient/family agree to work toward stated goals and plan of care. [ ]         Patient understands intent and goals of therapy, but is neutral about his/her participation. [ ]         Patient is unable to participate in goal setting and plan of care. Thank you for this referral.  Court Glover, ALLISON   Time Calculation: 28 mins  Regarding student involvement in patient care:  A student participated in this treatment session. Per CMS Medicare statements and APTA guidelines I certify that the following was true:  1. I was present and directly observed the entire session. 2. I made all skilled judgments and clinical decisions regarding care. 3. I am the practitioner responsible for assessment, treatment, and documentation.

## 2017-02-28 ENCOUNTER — ANESTHESIA (OUTPATIENT)
Dept: ENDOSCOPY | Age: 77
DRG: 812 | End: 2017-02-28
Payer: MEDICARE

## 2017-02-28 ENCOUNTER — ANESTHESIA EVENT (OUTPATIENT)
Dept: ENDOSCOPY | Age: 77
DRG: 812 | End: 2017-02-28
Payer: MEDICARE

## 2017-02-28 LAB
ABO + RH BLD: NORMAL
BLD PROD TYP BPU: NORMAL
BLOOD GROUP ANTIBODIES SERPL: NORMAL
BPU ID: NORMAL
CROSSMATCH RESULT,%XM: NORMAL
SPECIMEN EXP DATE BLD: NORMAL
STATUS OF UNIT,%ST: NORMAL
UNIT DIVISION, %UDIV: 0

## 2017-02-28 PROCEDURE — 74011250637 HC RX REV CODE- 250/637: Performed by: INTERNAL MEDICINE

## 2017-02-28 PROCEDURE — 65660000000 HC RM CCU STEPDOWN

## 2017-02-28 PROCEDURE — 88305 TISSUE EXAM BY PATHOLOGIST: CPT | Performed by: SPECIALIST

## 2017-02-28 PROCEDURE — 77030009426 HC FCPS BIOP ENDOSC BSC -B: Performed by: SPECIALIST

## 2017-02-28 PROCEDURE — 97110 THERAPEUTIC EXERCISES: CPT

## 2017-02-28 PROCEDURE — 74011000250 HC RX REV CODE- 250

## 2017-02-28 PROCEDURE — 88312 SPECIAL STAINS GROUP 1: CPT | Performed by: INTERNAL MEDICINE

## 2017-02-28 PROCEDURE — 74011636637 HC RX REV CODE- 636/637: Performed by: INTERNAL MEDICINE

## 2017-02-28 PROCEDURE — 0DJD8ZZ INSPECTION OF LOWER INTESTINAL TRACT, VIA NATURAL OR ARTIFICIAL OPENING ENDOSCOPIC: ICD-10-PCS | Performed by: SPECIALIST

## 2017-02-28 PROCEDURE — 74011250636 HC RX REV CODE- 250/636: Performed by: SPECIALIST

## 2017-02-28 PROCEDURE — 74011250636 HC RX REV CODE- 250/636

## 2017-02-28 PROCEDURE — 0DB68ZX EXCISION OF STOMACH, VIA NATURAL OR ARTIFICIAL OPENING ENDOSCOPIC, DIAGNOSTIC: ICD-10-PCS | Performed by: SPECIALIST

## 2017-02-28 PROCEDURE — 76040000007: Performed by: SPECIALIST

## 2017-02-28 PROCEDURE — 88112 CYTOPATH CELL ENHANCE TECH: CPT | Performed by: INTERNAL MEDICINE

## 2017-02-28 PROCEDURE — 0DB58ZX EXCISION OF ESOPHAGUS, VIA NATURAL OR ARTIFICIAL OPENING ENDOSCOPIC, DIAGNOSTIC: ICD-10-PCS | Performed by: SPECIALIST

## 2017-02-28 PROCEDURE — 76060000032 HC ANESTHESIA 0.5 TO 1 HR: Performed by: SPECIALIST

## 2017-02-28 PROCEDURE — 0DB98ZX EXCISION OF DUODENUM, VIA NATURAL OR ARTIFICIAL OPENING ENDOSCOPIC, DIAGNOSTIC: ICD-10-PCS | Performed by: SPECIALIST

## 2017-02-28 RX ORDER — HYDROCODONE BITARTRATE AND ACETAMINOPHEN 5; 325 MG/1; MG/1
1 TABLET ORAL
Qty: 10 TAB | Refills: 0 | Status: SHIPPED | OUTPATIENT
Start: 2017-02-28 | End: 2017-04-17 | Stop reason: ALTCHOICE

## 2017-02-28 RX ORDER — FLUCONAZOLE 200 MG/1
200 TABLET ORAL DAILY
Qty: 13 TAB | Refills: 0 | Status: SHIPPED | OUTPATIENT
Start: 2017-03-01 | End: 2017-03-14

## 2017-02-28 RX ORDER — FLUCONAZOLE 200 MG/1
200 TABLET ORAL DAILY
Status: DISCONTINUED | OUTPATIENT
Start: 2017-03-01 | End: 2017-03-01 | Stop reason: HOSPADM

## 2017-02-28 RX ORDER — LIDOCAINE HYDROCHLORIDE 20 MG/ML
INJECTION, SOLUTION EPIDURAL; INFILTRATION; INTRACAUDAL; PERINEURAL AS NEEDED
Status: DISCONTINUED | OUTPATIENT
Start: 2017-02-28 | End: 2017-02-28 | Stop reason: HOSPADM

## 2017-02-28 RX ORDER — NALOXONE HYDROCHLORIDE 0.4 MG/ML
0.4 INJECTION, SOLUTION INTRAMUSCULAR; INTRAVENOUS; SUBCUTANEOUS
Status: DISCONTINUED | OUTPATIENT
Start: 2017-02-28 | End: 2017-02-28 | Stop reason: HOSPADM

## 2017-02-28 RX ORDER — DEXTROMETHORPHAN/PSEUDOEPHED 2.5-7.5/.8
1.2 DROPS ORAL
Status: DISCONTINUED | OUTPATIENT
Start: 2017-02-28 | End: 2017-02-28 | Stop reason: HOSPADM

## 2017-02-28 RX ORDER — SODIUM CHLORIDE 0.9 % (FLUSH) 0.9 %
5-10 SYRINGE (ML) INJECTION EVERY 8 HOURS
Status: ACTIVE | OUTPATIENT
Start: 2017-02-28 | End: 2017-02-28

## 2017-02-28 RX ORDER — PROPOFOL 10 MG/ML
.5-2 INJECTION, EMULSION INTRAVENOUS ONCE
Status: DISCONTINUED | OUTPATIENT
Start: 2017-02-28 | End: 2017-02-28 | Stop reason: HOSPADM

## 2017-02-28 RX ORDER — FLUMAZENIL 0.1 MG/ML
0.2 INJECTION INTRAVENOUS
Status: DISCONTINUED | OUTPATIENT
Start: 2017-02-28 | End: 2017-02-28 | Stop reason: HOSPADM

## 2017-02-28 RX ORDER — SODIUM CHLORIDE 0.9 % (FLUSH) 0.9 %
5-10 SYRINGE (ML) INJECTION AS NEEDED
Status: ACTIVE | OUTPATIENT
Start: 2017-02-28 | End: 2017-02-28

## 2017-02-28 RX ORDER — PANTOPRAZOLE SODIUM 40 MG/1
40 TABLET, DELAYED RELEASE ORAL
Qty: 30 TAB | Refills: 0 | Status: SHIPPED | OUTPATIENT
Start: 2017-02-28

## 2017-02-28 RX ORDER — ATROPINE SULFATE 0.1 MG/ML
0.5 INJECTION INTRAVENOUS
Status: DISCONTINUED | OUTPATIENT
Start: 2017-02-28 | End: 2017-02-28 | Stop reason: HOSPADM

## 2017-02-28 RX ORDER — SODIUM CHLORIDE 9 MG/ML
50 INJECTION, SOLUTION INTRAVENOUS CONTINUOUS
Status: DISCONTINUED | OUTPATIENT
Start: 2017-02-28 | End: 2017-02-28 | Stop reason: HOSPADM

## 2017-02-28 RX ORDER — EPINEPHRINE 0.1 MG/ML
1 INJECTION INTRACARDIAC; INTRAVENOUS
Status: DISCONTINUED | OUTPATIENT
Start: 2017-02-28 | End: 2017-02-28 | Stop reason: HOSPADM

## 2017-02-28 RX ORDER — PROPOFOL 10 MG/ML
INJECTION, EMULSION INTRAVENOUS AS NEEDED
Status: DISCONTINUED | OUTPATIENT
Start: 2017-02-28 | End: 2017-02-28 | Stop reason: HOSPADM

## 2017-02-28 RX ADMIN — VENLAFAXINE HYDROCHLORIDE 150 MG: 150 CAPSULE, EXTENDED RELEASE ORAL at 08:16

## 2017-02-28 RX ADMIN — DOCUSATE SODIUM 100 MG: 100 CAPSULE, LIQUID FILLED ORAL at 18:21

## 2017-02-28 RX ADMIN — PREDNISOLONE ACETATE 1 DROP: 10 SUSPENSION/ DROPS OPHTHALMIC at 08:18

## 2017-02-28 RX ADMIN — PROPOFOL 320 MG: 10 INJECTION, EMULSION INTRAVENOUS at 12:29

## 2017-02-28 RX ADMIN — MELATONIN 3 MG ORAL TABLET 6 MG: 3 TABLET ORAL at 21:45

## 2017-02-28 RX ADMIN — HYDROCODONE BITARTRATE AND ACETAMINOPHEN 1 TABLET: 5; 325 TABLET ORAL at 08:17

## 2017-02-28 RX ADMIN — PREDNISOLONE ACETATE 1 DROP: 10 SUSPENSION/ DROPS OPHTHALMIC at 21:49

## 2017-02-28 RX ADMIN — HYDROCODONE BITARTRATE AND ACETAMINOPHEN 1 TABLET: 5; 325 TABLET ORAL at 16:32

## 2017-02-28 RX ADMIN — MIRTAZAPINE 45 MG: 15 TABLET, FILM COATED ORAL at 21:46

## 2017-02-28 RX ADMIN — PANTOPRAZOLE SODIUM 40 MG: 40 TABLET, DELAYED RELEASE ORAL at 08:17

## 2017-02-28 RX ADMIN — FOLIC ACID 1 MG: 1 TABLET ORAL at 08:17

## 2017-02-28 RX ADMIN — METOPROLOL TARTRATE 25 MG: 25 TABLET ORAL at 08:17

## 2017-02-28 RX ADMIN — METOPROLOL TARTRATE 25 MG: 25 TABLET ORAL at 18:21

## 2017-02-28 RX ADMIN — CEFUROXIME AXETIL 250 MG: 250 TABLET ORAL at 08:16

## 2017-02-28 RX ADMIN — CLONAZEPAM 0.5 MG: 0.5 TABLET ORAL at 08:16

## 2017-02-28 RX ADMIN — CLONAZEPAM 0.5 MG: 0.5 TABLET ORAL at 16:32

## 2017-02-28 RX ADMIN — PREDNISOLONE ACETATE 1 DROP: 10 SUSPENSION/ DROPS OPHTHALMIC at 16:32

## 2017-02-28 RX ADMIN — TRAZODONE HYDROCHLORIDE 150 MG: 100 TABLET ORAL at 21:46

## 2017-02-28 RX ADMIN — CLONAZEPAM 0.5 MG: 0.5 TABLET ORAL at 21:46

## 2017-02-28 RX ADMIN — OLANZAPINE 10 MG: 10 TABLET, FILM COATED ORAL at 21:46

## 2017-02-28 RX ADMIN — LIDOCAINE HYDROCHLORIDE 80 MG: 20 INJECTION, SOLUTION EPIDURAL; INFILTRATION; INTRACAUDAL; PERINEURAL at 12:05

## 2017-02-28 RX ADMIN — Medication 10 ML: at 16:33

## 2017-02-28 RX ADMIN — PREDNISONE 40 MG: 10 TABLET ORAL at 08:17

## 2017-02-28 RX ADMIN — Medication 5 ML: at 21:47

## 2017-02-28 RX ADMIN — SODIUM CHLORIDE 50 ML/HR: 900 INJECTION, SOLUTION INTRAVENOUS at 11:18

## 2017-02-28 NOTE — PERIOP NOTES
TRANSFER - OUT REPORT:    Verbal report given to Ruma(name) on June Marielena Williamson  being transferred to [facility](unit) for routine progression of care       Report consisted of patients Situation, Background, Assessment and   Recommendations(SBAR). Information from the following report(s) Procedure Summary was reviewed with the receiving nurse. Lines:   Peripheral IV 02/25/17 Right Wrist (Active)   Site Assessment Clean, dry, & intact 2/28/2017  8:40 AM   Phlebitis Assessment 0 2/28/2017  8:40 AM   Infiltration Assessment 0 2/28/2017  8:40 AM   Dressing Status Clean, dry, & intact 2/28/2017  8:40 AM   Dressing Type Transparent 2/28/2017  8:40 AM   Hub Color/Line Status Capped;Pink 2/28/2017  8:40 AM        Opportunity for questions and clarification was provided.       Patient transported with:

## 2017-02-28 NOTE — ANESTHESIA PREPROCEDURE EVALUATION
Anesthetic History   No history of anesthetic complications            Review of Systems / Medical History  Patient summary reviewed, nursing notes reviewed and pertinent labs reviewed    Pulmonary          Smoker      Comments: Former smoker   Neuro/Psych         Psychiatric history     Cardiovascular    Hypertension: well controlled              Exercise tolerance: <4 METS     GI/Hepatic/Renal  Within defined limits              Endo/Other        Obesity, arthritis and anemia     Other Findings   Comments: Syncope  Daily prednisone use for RA.          Physical Exam    Airway  Mallampati: II  TM Distance: 4 - 6 cm  Neck ROM: normal range of motion   Mouth opening: Normal     Cardiovascular    Rhythm: regular  Rate: normal         Dental  No notable dental hx       Pulmonary  Breath sounds clear to auscultation               Abdominal  GI exam deferred       Other Findings            Anesthetic Plan    ASA: 3  Anesthesia type: total IV anesthesia          Induction: Intravenous  Anesthetic plan and risks discussed with: Patient

## 2017-02-28 NOTE — PROCEDURES
Colonoscopy Procedure Note    Indications:   Iron deficiency anemia    Referring Physician: Eric Jay MD  Anesthesia/Sedation: MAC anesthesia Propofol  Endoscopist:  Dr. Madhavi Anderson    Procedure in Detail:  Informed consent was obtained for the procedure, including sedation. Risks of perforation, hemorrhage, adverse drug reaction, and aspiration were discussed. The patient was placed in the left lateral decubitus position. Based on the pre-procedure assessment, including review of the patient's medical history, medications, allergies, and review of systems, she had been deemed to be an appropriate candidate for moderate sedation; she was therefore sedated with the medications listed above. The patient was monitored continuously with ECG tracing, pulse oximetry, blood pressure monitoring, and direct observations. A rectal examination was performed. The [id number] was inserted into the rectum and advanced under direct vision to the terminal ileum. The quality of the colonic preparation was adequate. A careful inspection was made as the colonoscope was withdrawn, including a retroflexed view of the rectum; findings and interventions are described below. Appropriate photodocumentation was obtained. Findings:   1. Scope advanced to the cecum and terminal ileum. 2.  Preparation was adequate. 3.  Diffuse diverticulosis of the sigmoid colon. 4.  No polyps seen. 5.  Grade 1 nonbleeding internal hemorrhoids. Therapies:  none    Specimen:  none     Complications: None were encountered during the procedure. EBL: < 10 ml.     Recommendations:   -No GI bleeding source found  -F/U with Dr. Eliza Sheridan as needed    Signed By: Jaya Sloan MD                        February 28, 2017

## 2017-02-28 NOTE — PROGRESS NOTES
Physical Therapy    Patient is currently off the floor for procedure. Will check back later. Vaughn Salinas, SPT/ Gladys Lopez PT, DPT

## 2017-02-28 NOTE — ROUTINE PROCESS
Bedside shift change report given to Mary Dean RN (oncoming nurse) by Zach Cook (offgoing nurse). Report included the following information SBAR, Kardex, ED Summary, Intake/Output, MAR and Recent Results.      Zone Phone: [phone]       Significant changes during shift:  NPO for EGD and Colonoscopy       Patient Information      Elaine Mary  76 y.o.  2/23/2017 9:30 PM by Lydia Dockery MD. Elaine Valenzuela was admitted from [facility]  Problem List                 Patient Active Problem List      Diagnosis Date Noted    Syncope 02/24/2017                  Past Medical History:   Diagnosis Date    Baker's cyst of knee       Depression       Hypertension       RA (rheumatoid arthritis) ([facility] [address]                  Core Measures:      CVA: No No  CHF:No No  PNA:No No      Post Op Surgical (If Applicable):       n/a      Activity Status:      OOB to Chair No  Ambulated this shift No   Bed Rest Yes      Supplemental O2: (If Applicable)      n/a          LINES AND DRAINS:      peripheral right wrist 20G      DVT prophylaxis:      DVT prophylaxis Med- No  DVT prophylaxis SCD or MARISSA- YES      Wounds: (If Applicable)      Wounds- No      Location -      Patient Safety:      Falls Score Total Score: 3  Safety Level___III____  Bed Alarm On? No  Sitter?  No      Plan for upcoming shift: consent for EGD/Colonoscopy  in AM, NPO past MD for EGD and colonoscopy          Discharge Plan: Yes when stable      Active Consults: PT Consult

## 2017-02-28 NOTE — PROGRESS NOTES
Hospitalist Progress Note    NAME: Elaine Mary   :  1940   MRN:  [id number]       Interim Hospital Summary: 78 y.o. female whom presented on 2017 with      Assessment / Plan:  Addendum:  Diflucan for candida  Esophagitis to complete 14 days    Iron Deficiency Anemia  Syncope  - orthostatic hypotension likely from anemia. -s/p 1 unit prbc  -Cscope today  -GI following  -likely dc in the AM if she remains stable      RA  -continue prednisone 40 mg daily  -humira twice a month, methotrexate weekly     Recurrent UTI  -will continue ceftin at ppx dose  -f/u urine culture, UA with 5-10 WBC 2+bacteria, patient asymptomatic     Decondition  -up in chair  -PT/OT  -likely back to [place] in the AM    Pt has hx of prescription abuse per daughter and will often ask for more pain medications for sciatica per daughter. Code Status: DNR - discussed with patient at bedside  Surrogate Decision Maker: daughter. Discussed plans and treatment with daughter, Janet.     DVT Prophylaxis: SCD  GI Prophylaxis: not indicated     Baseline: staying adelfo currently, uses walker             Subjective:     Chief Complaint / Reason for Physician Visit  No acute complaints. Discussed with RN events overnight. Review of Systems:  Symptom Y/N Comments  Symptom Y/N Comments   Fever/Chills n   Chest Pain n    Poor Appetite n   Edema n    Cough n   Abdominal Pain n    Sputum n   Joint Pain n    SOB/MADERA    Pruritis/Rash     Nausea/vomit    Tolerating PT/OT     Diarrhea    Tolerating Diet     Constipation    Other x Sciatica pain     Could NOT obtain due to:      Objective:     VITALS:   Last 24hrs VS reviewed since prior progress note.  Most recent are:  Patient Vitals for the past 24 hrs:   Temp Pulse Resp BP SpO2   17 1102 98.8 °F (37.1 °C) 96 12 (!) 146/96 97 %   17 0718 97.7 °F (36.5 °C) (!) 118 16 132/72 97 %   17 0500 98 °F (36.7 °C) 93 16 138/79 95 %   17 2314 98 °F (36.7 °C) 89 18 143/71 97 %   02/27/17 1937 98 °F (36.7 °C) 82 16 157/78 -   02/27/17 1744 - 89 - 137/79 -   02/27/17 1538 98.3 °F (36.8 °C) 95 16 160/78 97 %   02/27/17 1116 98.7 °F (37.1 °C) 84 16 145/60 97 %       Intake/Output Summary (Last 24 hours) at 02/28/17 1109  Last data filed at 02/27/17 1745   Gross per 24 hour   Intake              360 ml   Output             1100 ml   Net             -740 ml        PHYSICAL EXAM:  General: WD, WN. Alert, cooperative, no acute distress    EENT:  EOMI. Anicteric sclerae. MMM  Resp:  CTA bilaterally, no wheezing or rales. No accessory muscle use  CV:  Regular  rhythm,  No edema  GI:  Soft, Non distended, Non tender.  +Bowel sounds  Neurologic:  Alert and oriented X 3, normal speech,   Psych:   Good insight. Not anxious nor agitated  Skin:  No rashes. No jaundice    Reviewed most current lab test results and cultures  YES  Reviewed most current radiology test results   YES  Review and summation of old records today    NO  Reviewed patient's current orders and MAR    YES  PMH/ reviewed - no change compared to H&P  ________________________________________________________________________  Care Plan discussed with:    Comments   Patient x    Family  x daughter   RN x    Care Manager     Consultant                        Multidiciplinary team rounds were held today with , nursing, pharmacist and clinical coordinator. Patient's plan of care was discussed; medications were reviewed and discharge planning was addressed.      ________________________________________________________________________  Total NON critical care TIME:  35   Minutes  Total CRITICAL CARE TIME Spent:   Minutes non procedure based      Comments   >50% of visit spent in counseling and coordination of care     ________________________________________________________________________  Tatiana Blankenship MD     Procedures: see electronic medical records for all procedures/Xrays and details which were not copied into this note but were reviewed prior to creation of Plan. LABS:  I reviewed today's most current labs and imaging studies. Pertinent labs include:  Recent Labs      02/27/17   0304  02/26/17   1215  02/26/17   0413   WBC  3.0*   --    --    HGB  7.5*  9.1*  8.2*   HCT  23.3*  27.3*  24.8*   PLT  167   --    --      No results for input(s): NA, K, CL, CO2, GLU, BUN, CREA, CA, MG, PHOS, ALB, TBIL, TBILI, SGOT, ALT, INR in the last 72 hours.     No lab exists for component: [place], INREXT    Signed: Araceli Haile MD

## 2017-02-28 NOTE — PROGRESS NOTES
Patient VSS; resting quielty; no complaints at this time; waiting on patient transport to bring patient back to room 2363

## 2017-02-28 NOTE — PROCEDURES
Esophagogastroduodenoscopy Procedure Note      Elaine Mary  1940  [id number]    Indication:  Heme + stool, Iron Deficiency anemia     Endoscopist: Marina Boas, MD    Referring Provider:  Xiao Duggan MD    Sedation:  MAC anesthesia Propofol    Procedure Details:  After infomed consent was obtained for the procedure, with all risks and benefits of procedure explained the patient was taken to the endoscopy suite and placed in the left lateral decubitus position. Following sequential administration of sedation as per above, the endoscope was inserted into the mouth and advanced under direct vision to second portion of the duodenum. A careful inspection was made as the gastroscope was withdrawn, including a retroflexed view of the proximal stomach; findings and interventions are described below. Findings:     Esophagus:   + Diffuse whitish plaques in the esophagus c/w Candida Esophagitis s/p Brushing.  - Normal Z line at 37 cm from incisors s/p Bx  + 3 cm Hiatal Hernia with a nonobstructing ring at EG junction    Stomach:   - There was diffuse erythema in the stomach without any erosions s/p Bx. Duodenum:   - The bulb and post bulbar mucosa is normal in appearance to the second portion. The duodenal folds appeared normal.  Cold forceps biopsies to r/o celiac. Therapies:  See above    Specimen: Specimens were collected as described and send to the laboratory. Complications:   None were encountered during the procedure. EBL:  < 10 ml.           Recommendations:   -No GI bleeding source found   -treat with Diflucan for Candida Esophagitis    Marina Boas, MD  2/28/2017  12:32 PM

## 2017-02-28 NOTE — DISCHARGE SUMMARY
Discharge Summary  Name: Luma Hogan  [id number]  YOB: 1940 (Age: 78 y.o.)   Date of Admission: 2/23/2017  Date of Discharge: 3/1/2017  Attending Physician: Homa Gates MD    Discharge Diagnosis:   Candida esophagitis  Iron deficiency anemia  Syncope  RA  Hx of recurrent UTI    Consultants: GI    Procedures:   EGD/Cscope 2/28    Brief Admission History/Reason for Admission Per Sonali Cast MD:   Elaine Carreon is a 78 y.o.  female with history of rheumatoid arthritis, depression who presents after syncopal episode. Patient was getting up out of bed when she felt lightheaded and then passed out. Daughter witnessed event and it lasted a few seconds. Patient had no confusion after. Patient has been eating and drinking well. Has had a cold for the past few days. Only new medication is prednisone for last 2 days for her pain. Denies any bleeding or dark stools. Has history of anemia, has had colonoscopy and EGD per patient and denies any findings. Brief Hospital Course by Main Problems:  Candida esophagitis   Iron Deficiency Anemia/Syncope  Pt had orthostatic hypotension likely from anemia. She received 1unit prbc. H/H remained stable. No obvious bleed seen during hospital stay. She underwent EGD/Cscope on 2/28 that showed candida esophagitis and diverticulosis. No active bleeding seen. Patient is stable for discharge. Follow up with GI in 2-4 weeks. She will continue with diflucan to complete 14 day course, PPI as prescribed. RA, continue prednisone 40 mg daily, Humira twice a month, methotrexate weekly, folic acid.      Recurrent UTI, resumed ceftin at ppx dose. Pt asymptomatic since admission.      Decondition, chronic sciatica pain. Has hx of prescription pain med abuse. Pt often requesting pain medications around the clock. Encourage her to work with PT/OT, up in chair. Discharge Exam:  Patient seen and examined by me on discharge day.   Pertinent Findings:  Visit Vitals    /52    Pulse 94    Temp 98.2 °F (36.8 °C)    Resp 18    Ht 5' 2\" (1.575 m)    Wt 79.8 kg (176 lb)    SpO2 94%    Breastfeeding No    BMI 32.19 kg/m2     Gen:    Not in distress  Chest: Clear lungs  CVS:   Regular rhythm. No edema  Abd:  Soft, not distended, not tender    Discharge/Recent Laboratory Results:  Recent Labs      03/01/17 0419   NA  140   K  3.0*   CL  101   CO2  31   BUN  24*   GLU  99   CA  8.2*     Recent Labs      03/01/17 0419   HGB  7.8*   HCT  23.8*   WBC  5.1   PLT  224       Discharge Medications:  Current Discharge Medication List      START taking these medications    Details   ferrous sulfate (IRON, FERROUS SULFATE,) 325 mg (65 mg iron) tablet Take 1 Tab by mouth two (2) times a day. Qty: 30 Tab, Refills: 0      fluconazole (DIFLUCAN) 200 mg tablet Take 1 Tab by mouth daily for 13 days. FDA advises cautious prescribing of oral fluconazole in pregnancy. Qty: 13 Tab, Refills: 0      pantoprazole (PROTONIX) 40 mg tablet Take 1 Tab by mouth Daily (before breakfast). Qty: 30 Tab, Refills: 0         CONTINUE these medications which have CHANGED    Details   HYDROcodone-acetaminophen (NORCO) 5-325 mg per tablet Take 1 Tab by mouth every eight (8) hours as needed. Max Daily Amount: 3 Tabs. Qty: 10 Tab, Refills: 0         CONTINUE these medications which have NOT CHANGED    Details   traZODone (DESYREL) 150 mg tablet Take 150 mg by mouth nightly. venlafaxine-SR (EFFEXOR-XR) 150 mg capsule Take  by mouth daily. folic acid (FOLVITE) 1 mg tablet Take  by mouth daily. methotrexate (RHEUMATREX) 2.5 mg tablet Take 15 mg by mouth every Sunday. Takes 6 tabs      docusate sodium (COLACE) 100 mg capsule Take 100 mg by mouth two (2) times a day. cefUROXime (CEFTIN) 250 mg tablet Take 250 mg by mouth daily. polyvinyl alcohol (LIQUIFILM TEARS) 1.4 % ophthalmic solution Administer 1 Drop to both eyes as needed.       acetaminophen (TYLENOL) 325 mg tablet Take  by mouth every four (4) hours as needed for Pain. SENNA Take 2 Tabs by mouth once. adalimumab (HUMIRA PEN) 40 mg/0.8 mL pnkt 0.8 mL by SubCUTAneous route every fourteen (14) days. Qty: 2 Kit, Refills: 6      predniSONE (DELTASONE) 5 mg tablet 20mg x 3 days, 15mg x 3 days, 10mg x 3 days, 5mg x 3 days  Qty: 30 Tab, Refills: 1      adalimumab (HUMIRA) 40 mg/0.8 mL injection by SubCUTAneous route. Duplicate      clonazePAM (KLONOPIN) 0.5 mg tablet Take 0.5 mg by mouth three (3) times daily. lovastatin (MEVACOR) 40 mg tablet Take 40 mg by mouth daily. metoprolol (LOPRESSOR) 25 mg tablet Take 25 mg by mouth two (2) times a day. mirtazapine (REMERON) 45 mg tablet Take 45 mg by mouth nightly. OLANZapine (ZYPREXA) 10 mg tablet Take 10 mg by mouth nightly. Takes 15 mg at bedtime      prednisoLONE acetate (PRED FORTE) 1 % ophthalmic suspension Administer 1 Drop to left eye three (3) times daily.          STOP taking these medications       traMADol (ULTRAM) 50 mg tablet Comments:   Reason for Stopping:               DISPOSITION:    Home with Family:    Home with HH/PT/OT/RN:    SNF/LTC: x   STEVENSON:    OTHER:          Follow up with:   PCP : Krish Nicholas MD  Follow-up Information     Follow up With Details [address] Shelia Barreto MD In 5 days  [address]   [address] [facility] Sherri Downs MD In 2 weeks  [address]  [phone]            Diet: cardiac    Total time in minutes spent coordinating this discharge (includes going over instructions, follow-up, prescriptions, and preparing report for sign off to her PCP) :  35 minutes

## 2017-02-28 NOTE — PROGRESS NOTES
Physical Therapy    Consult received and chart reviewed. Patient is currently off the floor. Will check back and attempt PT evaluation later. Carlos Salinas, SPT/ Cristhian Elder PT, DPT

## 2017-02-28 NOTE — PERIOP NOTES
TRANSFER - IN REPORT:    Verbal report received from [facility] on June Danton Amandeep  being received from 3105(unit) for ordered procedure      Report consisted of patients Situation, Background, Assessment and   Recommendations(SBAR). Information from the following report(s) SBAR, Intake/Output and MAR was reviewed with the receiving nurse. Opportunity for questions and clarification was provided. Assessment completed upon patients arrival to unit and care assumed.

## 2017-02-28 NOTE — ROUTINE PROCESS
Elaine Mary  1940  [id number]    Situation:  Verbal report received from: Angélica Perera  Procedure: Procedure(s):  ESOPHAGOGASTRODUODENOSCOPY (EGD)  COLONOSCOPY  ENDOSCOPIC BRUSHING  ESOPHAGOGASTRODUODENAL (EGD) BIOPSY    Background:    Preoperative diagnosis: gi bleed  Postoperative diagnosis: egd- hiatal hernia, gastritis, candida esophagitis  colon- diverticulosisi    :  Dr. Tamara Edward  Assistant(s): Endoscopy Technician-1: Gregorio Mccarthy  Endoscopy RN-1: Ramiro Bernard RN    Specimens:   ID Type Source Tests Collected by Time Destination   1 : duodenum biopsy Preservative Duodenum  Nadeen Martinez MD 2/28/2017 1221 Pathology   2 : gastric biopsy Preservative Gastric  Nadeen Martinez MD 2/28/2017 1223 Pathology   3 : distal esophagus biopsy Preservative Esophagus, Distal  Nadeen Martinez MD 2/28/2017 1224 Pathology   1 : esopheal brushing Fresh Esophageal Brushing  Nadeen Martinez MD 2/28/2017 1220 Cytology     H. Pylori  no    Assessment:  Intra-procedure medications   Anesthesia gave intra-procedure sedation and medications, see anesthesia flow sheet yes    Intravenous fluids: NS@ KVO     Vital signs stable     Abdominal assessment: round and soft     Recommendation:  Discharge patient per MD order.   Return to floor  Family or Friend   Permission to share finding with family or friend yes

## 2017-02-28 NOTE — PERIOP NOTES
Patient still coughing up thick light yellow sputum. States she cannot cough it up.   A little was suctioned up with ammon

## 2017-02-28 NOTE — PROGRESS NOTES
Problem: Mobility Impaired (Adult and Pediatric)  Goal: *Acute Goals and Plan of Care (Insert Text)  Physical Therapy Goals  Initiated 2/27/2017  1. Patient will move from supine to sit and sit to supine , scoot up and down and roll side to side in bed with modified independence within 7 day(s). 2. Patient will transfer from bed to chair and chair to bed with modified independence using the least restrictive device within 7 day(s). 3. Patient will perform sit to stand with modified independence within 7 day(s). 4. Patient will ambulate with modified independence for 200 feet with the least restrictive device within 7 day(s). PHYSICAL THERAPY TREATMENT  Patient: lEaine Stock (78 y.o. female)  Date: 2/28/2017  Diagnosis: Syncope  gi bleed <principal problem not specified>  Procedure(s) (LRB):  ESOPHAGOGASTRODUODENOSCOPY (EGD) (N/A)  COLONOSCOPY (N/A)  ENDOSCOPIC BRUSHING (N/A)  ESOPHAGOGASTRODUODENAL (EGD) BIOPSY (N/A) Day of Surgery  Precautions:        ASSESSMENT:  Received patient supine in bed and cleared by nursing to participate. Patient agreeable to attempt sitting EOB today as she reports feeling \"weak and tired\" today. Patient underwent colonoscopy procedure earlier and Hbg levels low today. Attempted supine to sit Mod A x 1 for trunk support and Max A for scooting towards EOB. Patient c/o severe L sided sciatic nerve pain (unable to rate) and becoming tearful. States \"I just can't sit up today\" and returned to supine. Patient agreeable to supine LE bed exercises for strengthening. Instructed in exercises with patient demonstrating appropriate technique. Educated patient to perform exercises later this evening. Plan to attempt OOB mobility tmrw. At this time recommend d/c to SNF level rehab for continued rehab efforts. Will follow up tmrw for progression of mobility.    Progression toward goals:  [ ]    Improving appropriately and progressing toward goals  [X]    Improving slowly and progressing toward goals  [ ]    Not making progress toward goals and plan of care will be adjusted       PLAN:  Patient continues to benefit from skilled intervention to address the above impairments. Continue treatment per established plan of care. Discharge Recommendations:  Skilled Nursing Facility  Further Equipment Recommendations for Discharge:  None (owns RW)       SUBJECTIVE:   Patient stated I can't walk today.       OBJECTIVE DATA SUMMARY:   Critical Behavior:  Neurologic State: Alert  Orientation Level: Oriented X4  Cognition: Follows commands     Functional Mobility Training:  Bed Mobility:  Rolling: Stand-by asssistance  Supine to Sit: Moderate assistance;Assist x1 (secondary to L sided sciatic pain)  Sit to Supine: Stand-by asssistance  Scooting: Maximum assistance;Assist x1 (secondary to L sided sciatic pain)        Transfers:      Unable to attempt today secondary to c/o L sided sciatic nerve pain     Therapeutic Exercises:   Instructed in supine LE exercises:  Hip Adduction Squeezes x 10  Heel Slides x 10 B  Quad sets x 10 B  Hip Abduction x 10 B  SAQ x 10 B  Ankle pumps x 10 B  Pain:  Pain Scale 1: Visual  Pain Intensity 1: 0  Pain Location 1: Leg  Pain Orientation 1: Left  Pain Description 1: Aching  Pain Intervention(s) 1: Medication (see MAR)  Activity Tolerance:   VSS  Please refer to the flowsheet for vital signs taken during this treatment. After treatment:   [ ]    Patient left in no apparent distress sitting up in chair  [X]    Patient left in no apparent distress in bed  [X]    Call bell left within reach  [X]    Nursing notified  [ ]    Caregiver present  [ ]    Bed alarm activated      COMMUNICATION/COLLABORATION:   The patients plan of care was discussed with: Physical Therapist and Registered Nurse     Regarding student involvement in patient care:  A student participated in this treatment session. Per CMS Medicare statements and APTA guidelines I certify that the following was true:  1.  I was present and directly observed the entire session. 2. I made all skilled judgments and clinical decisions regarding care. 3. I am the practitioner responsible for assessment, treatment, and documentation. Zackery Salinas, [facility]   Time Calculation: 14 mins

## 2017-02-28 NOTE — PROGRESS NOTES
Bedside shift change report given to Edgardo Simon RN (oncoming nurse) by Marge Carroll RN (offgoing nurse). Report included the following information SBAR, Kardex, ED Summary, Intake/Output, MAR and Recent Results.      Zone Phone: [phone]       Significant changes during shift:  clear liquid diet      Patient Information      Elaine Mary  76 y.o.  2/23/2017 9:30 PM by Prashanth Elias MD. Elaine Baig was admitted from [facility]  Problem List                 Patient Active Problem List      Diagnosis Date Noted    Syncope 02/24/2017                  Past Medical History:   Diagnosis Date    Baker's cyst of knee       Depression       Hypertension       RA (rheumatoid arthritis) ([facility] [address]                  Core Measures:      CVA: No No  CHF:No No  PNA:No No      Post Op Surgical (If Applicable):       n/a      Activity Status:      OOB to Chair No  Ambulated this shift No   Bed Rest Yes      Supplemental O2: (If Applicable)      n/a          LINES AND DRAINS:      peripheral right wrist 20G      DVT prophylaxis:      DVT prophylaxis Med- No  DVT prophylaxis SCD or MARISSA- YES      Wounds: (If Applicable)      Wounds- No      Location -      Patient Safety:      Falls Score Total Score: 3  Safety Level___III____  Bed Alarm On? No  Sitter?  No      Plan for upcoming shift:  consent for EGD/Colonoscopy  in AM, NPO past MD for EGD and colonoscopy          Discharge Plan: Yes when stable      Active Consults: PT Consult

## 2017-02-28 NOTE — PROGRESS NOTES
Pharmacy Automatic Renal Dosing Protocol - Antimicrobials      Indication for Antimicrobials: Esophagitis  Current Regimen of Each Antimicrobial (Start Day & Day of Therapy):  Fluconazole 200 mg daily (Start  - Day 1    Significant cultures:    Urine: Enterobacter - Final    CAPD, Intermittent Hemodialysis or Renal Replacement Therapy: NA  Paralysis, amputations, malnutrition: None noted  Recent Labs      17   0304   WBC  3.0*     Temp (24hrs), Av.1 °F (36.7 °C), Min:97.7 °F (36.5 °C), Max:98.8 °F (37.1 °C)    Creatinine Clearance (ml/min): 55 ml/min      Goal Vancomycin Level(s):      Impression/Plan:     UTI with Enterobacter. Apparently colonized and on Cefuroxime once daily chronically: Per notes:    \"-will continue ceftin at ppx dose  -f/u urine culture, UA with 5-10 WBC 2+bacteria, patient asymptomatic\"    - Fluconazole now added for candida esophagitis. Dose appropriate. Pharmacy will follow daily and adjust doses of monitored medications as appropriate for renal function and/or serum levels.     Thank you,  Noris Hamilton, PHARMD

## 2017-02-28 NOTE — PERIOP NOTES
Anesthesia reports 320mg Propofol, 80mg Lidocaine and 500mL NS given during procedure. Received report from anesthesia staff on vital signs and status of patient.

## 2017-02-28 NOTE — PROGRESS NOTES
Per attending pt would be ready for discharge in the morning. RONAL contacted Cosme with [address] and rehab and they stated they could accept as early as 11:00AM in the morning. RONAL met with pt who stated her daughter would transport and she would inform her daughter to be here at 11:00AM to transport. RONAL will continue to follow for any additional discharge needs.     JOVANNY Ash  Ext [phone]

## 2017-02-28 NOTE — ANESTHESIA POSTPROCEDURE EVALUATION
Post-Anesthesia Evaluation and Assessment    Patient: Pedrito Roque MRN: [id number]  SSN: [id number]    YOB: 1940  Age: 78 y.o. Sex: female       Cardiovascular Function/Vital Signs  Visit Vitals    /72 (BP 1 Location: Right arm, BP Patient Position: At rest)    Pulse 84    Temp 36.9 °C (98.4 °F)    Resp 20    Ht 5' 2\" (1.575 m)    Wt 79.8 kg (176 lb)    SpO2 95%    Breastfeeding No    BMI 32.19 kg/m2       Patient is status post total IV anesthesia anesthesia for Procedure(s):  ESOPHAGOGASTRODUODENOSCOPY (EGD)  COLONOSCOPY  ENDOSCOPIC BRUSHING  ESOPHAGOGASTRODUODENAL (EGD) BIOPSY. Nausea/Vomiting: None    Postoperative hydration reviewed and adequate. Pain:  Pain Scale 1: Visual (02/28/17 1335)  Pain Intensity 1: 0 (02/28/17 1335)   Managed    Neurological Status:   Neuro  Neurologic State: Alert (02/28/17 0840)  Orientation Level: Oriented X4 (02/28/17 0840)  Cognition: Follows commands (02/28/17 0840)  Speech: Clear (02/28/17 0840)  LUE Motor Response: Weak (02/27/17 2314)  LLE Motor Response: Weak (02/27/17 2314)  RUE Motor Response: Weak (02/27/17 2314)  RLE Motor Response: Weak (02/27/17 2314)   At baseline    Mental Status and Level of Consciousness: Arousable    Pulmonary Status:   O2 Device: Room air (02/28/17 1412)   Adequate oxygenation and airway patent    Complications related to anesthesia: None    Post-anesthesia assessment completed.  No concerns    Signed By: Herby Favre, DO     February 28, 2017

## 2017-03-01 VITALS
BODY MASS INDEX: 32.39 KG/M2 | TEMPERATURE: 98.2 F | DIASTOLIC BLOOD PRESSURE: 52 MMHG | HEIGHT: 62 IN | SYSTOLIC BLOOD PRESSURE: 114 MMHG | OXYGEN SATURATION: 94 % | HEART RATE: 94 BPM | WEIGHT: 176 LBS | RESPIRATION RATE: 18 BRPM

## 2017-03-01 LAB
ANION GAP BLD CALC-SCNC: 8 MMOL/L (ref 5–15)
BUN SERPL-MCNC: 24 MG/DL (ref 6–20)
BUN/CREAT SERPL: 33 (ref 12–20)
CALCIUM SERPL-MCNC: 8.2 MG/DL (ref 8.5–10.1)
CHLORIDE SERPL-SCNC: 101 MMOL/L (ref 97–108)
CO2 SERPL-SCNC: 31 MMOL/L (ref 21–32)
CREAT SERPL-MCNC: 0.72 MG/DL (ref 0.55–1.02)
ERYTHROCYTE [DISTWIDTH] IN BLOOD BY AUTOMATED COUNT: 14.1 % (ref 11.5–14.5)
GLUCOSE SERPL-MCNC: 99 MG/DL (ref 65–100)
HCT VFR BLD AUTO: 23.8 % (ref 35–47)
HGB BLD-MCNC: 7.8 G/DL (ref 11.5–16)
MCH RBC QN AUTO: 31.3 PG (ref 26–34)
MCHC RBC AUTO-ENTMCNC: 32.8 G/DL (ref 30–36.5)
MCV RBC AUTO: 95.6 FL (ref 80–99)
PLATELET # BLD AUTO: 224 K/UL (ref 150–400)
POTASSIUM SERPL-SCNC: 3 MMOL/L (ref 3.5–5.1)
RBC # BLD AUTO: 2.49 M/UL (ref 3.8–5.2)
SODIUM SERPL-SCNC: 140 MMOL/L (ref 136–145)
WBC # BLD AUTO: 5.1 K/UL (ref 3.6–11)

## 2017-03-01 PROCEDURE — 74011250637 HC RX REV CODE- 250/637: Performed by: INTERNAL MEDICINE

## 2017-03-01 PROCEDURE — 85027 COMPLETE CBC AUTOMATED: CPT | Performed by: INTERNAL MEDICINE

## 2017-03-01 PROCEDURE — 80048 BASIC METABOLIC PNL TOTAL CA: CPT | Performed by: INTERNAL MEDICINE

## 2017-03-01 PROCEDURE — 36415 COLL VENOUS BLD VENIPUNCTURE: CPT | Performed by: INTERNAL MEDICINE

## 2017-03-01 RX ORDER — POTASSIUM CHLORIDE 750 MG/1
60 TABLET, FILM COATED, EXTENDED RELEASE ORAL
Status: DISCONTINUED | OUTPATIENT
Start: 2017-03-01 | End: 2017-03-01

## 2017-03-01 RX ORDER — LANOLIN ALCOHOL/MO/W.PET/CERES
325 CREAM (GRAM) TOPICAL 2 TIMES DAILY
Qty: 30 TAB | Refills: 0 | Status: SHIPPED
Start: 2017-03-01

## 2017-03-01 RX ORDER — POTASSIUM CHLORIDE 750 MG/1
60 TABLET, FILM COATED, EXTENDED RELEASE ORAL
Status: COMPLETED | OUTPATIENT
Start: 2017-03-01 | End: 2017-03-01

## 2017-03-01 RX ADMIN — PREDNISOLONE ACETATE 1 DROP: 10 SUSPENSION/ DROPS OPHTHALMIC at 09:11

## 2017-03-01 RX ADMIN — METOPROLOL TARTRATE 25 MG: 25 TABLET ORAL at 08:52

## 2017-03-01 RX ADMIN — HYDROCODONE BITARTRATE AND ACETAMINOPHEN 1 TABLET: 5; 325 TABLET ORAL at 09:11

## 2017-03-01 RX ADMIN — CEFUROXIME AXETIL 250 MG: 250 TABLET ORAL at 09:10

## 2017-03-01 RX ADMIN — PANTOPRAZOLE SODIUM 40 MG: 40 TABLET, DELAYED RELEASE ORAL at 09:10

## 2017-03-01 RX ADMIN — Medication 10 ML: at 04:27

## 2017-03-01 RX ADMIN — FLUCONAZOLE 200 MG: 200 TABLET ORAL at 09:08

## 2017-03-01 RX ADMIN — VENLAFAXINE HYDROCHLORIDE 150 MG: 150 CAPSULE, EXTENDED RELEASE ORAL at 08:52

## 2017-03-01 RX ADMIN — CLONAZEPAM 0.5 MG: 0.5 TABLET ORAL at 09:11

## 2017-03-01 RX ADMIN — FOLIC ACID 1 MG: 1 TABLET ORAL at 08:52

## 2017-03-01 RX ADMIN — POTASSIUM CHLORIDE 60 MEQ: 750 TABLET, FILM COATED, EXTENDED RELEASE ORAL at 09:11

## 2017-03-01 NOTE — PROGRESS NOTES
Bedside shift change report given to Tiffanie Stern, RN (oncoming nurse) by Andrew Chawla RN (offgoing nurse). Report included the following information SBAR, Kardex, ED Summary, Intake/Output, MAR and Recent Results.      Zone Phone: [phone]      Significant changes during shift: colonscopy done    Patient Information      Elaine Mary  76 y.o.  2/23/2017 9:30 PM by Carlie Martin MD. Elaine Larry was admitted from [facility]  Problem List      [address] [facility]   Patient Active Problem List      Diagnosis Date Noted    Syncope 02/24/2017                  Past Medical History:   Diagnosis Date    Baker's cyst of knee       Depression       Hypertension       RA (rheumatoid arthritis) ([facility] [address]                  Core Measures:      CVA: No No  CHF:No No  PNA:No No      Post Op Surgical (If Applicable):       n/a      Activity Status:      OOB to Chair No  Ambulated this shift No   Bed Rest no      Supplemental O2: (If Applicable)      n/a          LINES AND DRAINS:      peripheral left A/C      DVT prophylaxis:      DVT prophylaxis Med- No  DVT prophylaxis SCD or MARISSA- YES      Wounds: (If Applicable)      Wounds- No      Location -      Patient Safety:      Falls Score Total Score: 3  Safety Level_______  Bed Alarm On? No  Sitter?  No      Plan for upcoming shift: monitor pain               Discharge Plan: Yes tomorrow      Active Consults:  IP CONSULT TO GASTROENTEROLOGY

## 2017-03-01 NOTE — PROGRESS NOTES
Patient discharged back to [facility] (Room 17B) with patient's daughter via daughter's personal vehicle. Discharge instructions were discussed, as well as follow-up appointments, medications, and side-effects. An opportunity for questions was presented and patient, and daughter, verbalized an understanding.

## 2017-03-01 NOTE — PROGRESS NOTES
TRANSFER - OUT REPORT:    Verbal report given to Hosea Rios RN (name) on June Britney Douglas  being transferred to [facility] for routine progression of care       Report consisted of patients Situation, Background, Assessment and   Recommendations(SBAR). Information from the following report(s) SBAR, Kardex, Intake/Output, MAR and Recent Results was reviewed with the receiving nurse. Lines:   Peripheral IV 02/25/17 Right Wrist (Active)   Site Assessment Clean, dry, & intact 3/1/2017  7:30 AM   Phlebitis Assessment 0 3/1/2017  7:30 AM   Infiltration Assessment 0 3/1/2017  7:30 AM   Dressing Status Clean, dry, & intact 3/1/2017  7:30 AM   Dressing Type Trach dressing 3/1/2017  7:30 AM   Hub Color/Line Status Capped;Pink 3/1/2017  7:30 AM        Opportunity for questions and clarification was provided.       Patient transported with:   Patients medications from home (eyedrops)

## 2017-03-01 NOTE — ROUTINE PROCESS
Bedside shift change report given to Darlene Phillip RN (oncoming nurse) by Chikis Webber (offgoing nurse). Report included the following information SBAR, Kardex, ED Summary, Intake/Output, MAR and Recent Results.

## 2017-03-01 NOTE — PROGRESS NOTES
Pharmacist Discharge Medication Reconciliation    Significant PMH:   Past Medical History:   Diagnosis Date    Baker's cyst of knee     Depression     Hypertension     RA (rheumatoid arthritis) ([facility] [address]      Chief Complaint for this Admission:   Chief Complaint   Patient presents with    Fall     Pt coming from [facility]; per EMS pt with multiple falls today, last one approx x 30 mins PTA; per daughter, witnessed pt with syncopal episode approx x 1 hour ago while she was visiting; denies hitting head, pt states she doesn't recall syncopal episode    Syncope     x1 hour PTA, as witnessed by daughter. Allergies: Codeine    Discharge Medications:   Current Discharge Medication List        START taking these medications    Details   fluconazole (DIFLUCAN) 200 mg tablet Take 1 Tab by mouth daily for 13 days. FDA advises cautious prescribing of oral fluconazole in pregnancy. Qty: 13 Tab, Refills: 0      pantoprazole (PROTONIX) 40 mg tablet Take 1 Tab by mouth Daily (before breakfast). Qty: 30 Tab, Refills: 0           CONTINUE these medications which have CHANGED    Details   HYDROcodone-acetaminophen (NORCO) 5-325 mg per tablet Take 1 Tab by mouth every eight (8) hours as needed. Max Daily Amount: 3 Tabs. Qty: 10 Tab, Refills: 0           CONTINUE these medications which have NOT CHANGED    Details   traZODone (DESYREL) 150 mg tablet Take 150 mg by mouth nightly. venlafaxine-SR (EFFEXOR-XR) 150 mg capsule Take  by mouth daily. folic acid (FOLVITE) 1 mg tablet Take  by mouth daily. methotrexate (RHEUMATREX) 2.5 mg tablet Take 15 mg by mouth every Sunday. Takes 6 tabs      docusate sodium (COLACE) 100 mg capsule Take 100 mg by mouth two (2) times a day. cefUROXime (CEFTIN) 250 mg tablet Take 250 mg by mouth daily. polyvinyl alcohol (LIQUIFILM TEARS) 1.4 % ophthalmic solution Administer 1 Drop to both eyes as needed.       acetaminophen (TYLENOL) 325 mg tablet Take  by mouth every four (4) hours as needed for Pain. SENNA Take 2 Tabs by mouth once. adalimumab (HUMIRA PEN) 40 mg/0.8 mL pnkt 0.8 mL by SubCUTAneous route every fourteen (14) days. Qty: 2 Kit, Refills: 6      predniSONE (DELTASONE) 5 mg tablet 20mg x 3 days, 15mg x 3 days, 10mg x 3 days, 5mg x 3 days  Qty: 30 Tab, Refills: 1      adalimumab (HUMIRA) 40 mg/0.8 mL injection by SubCUTAneous route. Duplicate      clonazePAM (KLONOPIN) 0.5 mg tablet Take 0.5 mg by mouth three (3) times daily. lovastatin (MEVACOR) 40 mg tablet Take 40 mg by mouth daily. metoprolol (LOPRESSOR) 25 mg tablet Take 25 mg by mouth two (2) times a day. mirtazapine (REMERON) 45 mg tablet Take 45 mg by mouth nightly. OLANZapine (ZYPREXA) 10 mg tablet Take 10 mg by mouth nightly. Takes 15 mg at bedtime      prednisoLONE acetate (PRED FORTE) 1 % ophthalmic suspension Administer 1 Drop to left eye three (3) times daily.            STOP taking these medications       traMADol (ULTRAM) 50 mg tablet Comments:   Reason for Stopping:               The patient's chart, MAR and AVS were reviewed by William Lemon, [address] Divya Harrison

## 2017-04-11 ENCOUNTER — TELEPHONE (OUTPATIENT)
Dept: RHEUMATOLOGY | Age: 77
End: 2017-04-11

## 2017-04-11 NOTE — TELEPHONE ENCOUNTER
Returned patient's call and spoke with patient's nurse Ciara Dawkins she said she will have to get on sight physician and see if her knee needs to be further evaluated.

## 2017-04-11 NOTE — TELEPHONE ENCOUNTER
Patient's brother called due to this patient's Right knee is swollen and she is not able to walk on it. Patient's cell phone is [phone].

## 2017-04-17 ENCOUNTER — OFFICE VISIT (OUTPATIENT)
Dept: RHEUMATOLOGY | Age: 77
End: 2017-04-17

## 2017-04-17 VITALS
WEIGHT: 156 LBS | SYSTOLIC BLOOD PRESSURE: 154 MMHG | BODY MASS INDEX: 27.64 KG/M2 | HEIGHT: 63 IN | RESPIRATION RATE: 22 BRPM | OXYGEN SATURATION: 97 % | TEMPERATURE: 98.2 F | DIASTOLIC BLOOD PRESSURE: 91 MMHG | HEART RATE: 98 BPM

## 2017-04-17 DIAGNOSIS — M17.0 PRIMARY OSTEOARTHRITIS OF BOTH KNEES: ICD-10-CM

## 2017-04-17 DIAGNOSIS — M05.79 SEROPOSITIVE RHEUMATOID ARTHRITIS OF MULTIPLE SITES (HCC): Primary | ICD-10-CM

## 2017-04-17 RX ORDER — TRAMADOL HYDROCHLORIDE 50 MG/1
TABLET ORAL
COMMUNITY
Start: 2017-02-21 | End: 2017-04-17 | Stop reason: ALTCHOICE

## 2017-04-17 RX ORDER — OLANZAPINE 15 MG/1
10 TABLET ORAL
Status: ON HOLD | COMMUNITY
Start: 2017-02-07 | End: 2018-07-07

## 2017-04-17 RX ORDER — LEVOTHYROXINE SODIUM 25 UG/1
37.5 TABLET ORAL
Status: ON HOLD | COMMUNITY
Start: 2017-03-30 | End: 2018-07-07 | Stop reason: CLARIF

## 2017-04-17 RX ORDER — FLUTICASONE PROPIONATE 50 MCG
SPRAY, SUSPENSION (ML) NASAL
COMMUNITY
Start: 2017-02-22 | End: 2018-07-07

## 2017-04-17 RX ORDER — IBUPROFEN 800 MG/1
TABLET ORAL
COMMUNITY
Start: 2017-02-13 | End: 2018-07-07

## 2017-04-17 RX ORDER — LIDOCAINE HYDROCHLORIDE 10 MG/ML
1 INJECTION INFILTRATION; PERINEURAL ONCE
Qty: 1 VIAL | Refills: 0
Start: 2017-04-17 | End: 2017-04-17

## 2017-04-17 NOTE — PROGRESS NOTES
RHEUMATOLOGY PROBLEM LIST AND CHIEF COMPLAINT  1. Rheumatoid arthritis (1995) - polyarthritis, significant deformity of multiple joints with subluxation, positive JOAN (1:160), positive CCP, positive rheumatoid factor    Therapy History:  Prior DMARDs: Remicade (allergy), hydroxychloroquine (not effective)  Current DMARDs: Humira (stopped in 2010, 2015-04/2016, 1/2017-current), methotrexate (current)    2. Osteoarthritis     INTERVAL HISTORY  This is a 78 y.o.  female. Today, the patient complains of pain in the joints. Location: knee  Severity:  8 on a scale of 0-10  Timing: all day   Duration:  2 months  Modifying factors: Humira, Methotrexate  Context/Associated signs and symptoms: The patient reports that she was in the hospital for 6 days since her last visit because \"she fainted and her blood count dropped. \" She reports having a normal endoscopy and colonoscopy, but states that her labs showed anemia so she has started on iron. She states that she has been using a stationary bike for exercise, but this has caused her right knee to become swollen and painful again. She denies any other arthralgia. She continues on Humira 40 mg q2 weeks and methotrexate 15 mg weekly, noting that the nurse at her nursing home \"said she needed to take methotrexate 15 mg weekly despite her new dose of 7.5 mg weekly. \"    RHEUMATOLOGY REVIEW OF SYSTEMS   Positives as per history  Negatives as follows:  CONSTITUTlONAL:  Denies unexplained persistent fevers or weight change  RESPIRATORY:  No pleuritic pain, exertional dyspnea  CARDIOVASCULAR:  Denies chest pain  GASTRO:   Denies heartburn, abdominal pain, nausea, vomiting, diarrhea  SKIN:    Denies rash   MSK:    No morning stiffness >1 hour    PAST MEDICAL HISTORY  Reviewed with patient, significant changes in medical history - Yes, recent hospitalization    FAMILY HISTORY  rheumatoid arthritis     PHYSICAL EXAM  Blood pressure (!) 154/91, pulse 98, temperature 98.2 °F (36.8 °C), temperature source Oral, resp. rate 22, height 5' 2.5\" (1.588 m), weight 156 lb (70.8 kg), SpO2 97 %. GENERAL APPEARANCE: Well-nourished, no acute distress  NECK: No adenopathy  ENT: No oral ulcers  CARDIOVASCULAR: Heart rhythm is regular. No murmur, rub, gallop  CHEST: Normal vesicular breath sounds. No wheezes, rales, pleural friction rubs  ABDOMINAL: The abdomen is soft and nontender. Bowel sounds are normal  SKIN: No rash, palpable purpura, digital ulcer, abnormal thickening   NEUROLOGICAL: antalgic gait, using walker  MUSCULOSKELETAL:   Upper extremities - ulnar deviation with boutonniere deformity and swan-neck deformity of bilateral hands. Subluxation of joints. Heberden's and Vivi's nodes noted. Wrist decreased ROM B/L. Lower extremities - bony prominence and tenderness over bilateral knee joints R>L. Right knee large effusion. Bilateral ankle swelling. Left foot soft tissue swelling and erythema. LABS, RADIOLOGY AND PROCEDURES   Previous labs reviewed - No new labs    ASSESSMENT  1. Rheumatoid arthritis (Established problem -  Stable disease) - The patient continues on Humira and methotrexate and her RA is stable. She did not complain of any symptoms today except for right knee pain which is osteoarthritic. On exam the warmth and tenderness of her ankles had resolved. I want her to continue on Humira 40 mg q2 weeks and I again want her to decrease her methotrexate to 7.5 mg weekly to avoid hair thinning. She should return in 4 months for a follow up. I have recommended seeing hematology if her blood count and anemia issues do not improve. 2. Keratitis - (Established problem -  Stable disease) - Her left eye keratitis is stable. 3. Osteoarthritis - This likely characterizes pain in her hips, knees, and shoulder. OTC meds and tramadol are recommended. I will inject her left knee with deop-medrol 40 mg today.    4. Drug therapy monitoring for toxicity (methotrexate) - CBC, BUN, Cr, AST, ALT and albumin every 4-6 months   5. Bone density - bone scan ordered in 12/2015 but has not been performed, she currently uses vitamin D supplementation. PLAN  1. Humira 40 mg every 14 days  2. Decrease Methotrexate to 7.5 mg weekly with folic acid 1 mg daily  3. Right knee injection  4. Return in 4 months    Ada Guevara MD  Adult and Pediatric Rheumatology     [facility], [address] [place] [address], Phone [phone], Fax [phone]     Visiting  of Pediatrics    Department of Pediatrics, [facility] of [address] [place] [address], [address] [place] [address], Phone [phone], Fax [phone]    There are no Patient Instructions on file for this visit. cc:  Titi Low MD    Written by blanco Esquivel, as dictated by Zenon Guevara M.D. [facility]  OFFICE PROCEDURE PROGRESS NOTE        Chart reviewed for the following:   Darien LIVINGSTON, have reviewed the History, Physical and updated the Allergic reactions for June [facility] performed immediately prior to start of procedure:   Darien LIVINGSTON, have performed the following reviews on June Williams prior to the start of the procedure:            * Patient was identified by name and date of birth   * Agreement on procedure being performed was verified  * Risks and Benefits explained to the patient  * Procedure site verified and marked as necessary  * Patient was positioned for comfort  * Consent was signed and verified     Time: 8:45 am      Date of procedure: 4/17/2017    Procedure performed by:   Darien Cash MD    Provider assisted by: none    Patient assisted by: son    How tolerated by patient: tolerated the procedure well with no complications    Post Procedural Pain Scale: 0 - No Hurt    Comments: none      PROCEDURE  After consent was obtained, using sterile technique the right knee was prepped and Depo-medrol 40 mg and 1ml of lidocaine was then injected and the needle withdrawn. The procedure was well tolerated. The patient is asked to continue to rest for 24 hours before resuming regular activities. It may be more painful for the first 1-2 days. Watch for fever, or increased swelling or persistent pain. Call or return to clinic prn if such symptoms occur.

## 2017-04-20 ENCOUNTER — TELEPHONE (OUTPATIENT)
Dept: RHEUMATOLOGY | Age: 77
End: 2017-04-20

## 2017-04-21 ENCOUNTER — TELEPHONE (OUTPATIENT)
Dept: RHEUMATOLOGY | Age: 77
End: 2017-04-21

## 2017-04-21 NOTE — TELEPHONE ENCOUNTER
[phone] cell #               [phone]                           This is her brothers number. Please call patient regarding injection not working.

## 2017-04-21 NOTE — TELEPHONE ENCOUNTER
Returned patient's call she wanted to know how long it would take for the injection to start working. I advised patient some people notice right away other it may take up to a week. Patient verbalized understanding.

## 2017-04-21 NOTE — TELEPHONE ENCOUNTER
Patient called back to give Dr. Dima Norris nurse this new number to call her instead of the number in the message below this. Cell phone is [phone]. She is at the [facility], she would like to be called directly back on her cell phone.

## 2017-07-20 ENCOUNTER — TELEPHONE (OUTPATIENT)
Dept: RHEUMATOLOGY | Age: 77
End: 2017-07-20

## 2017-08-21 ENCOUNTER — OFFICE VISIT (OUTPATIENT)
Dept: RHEUMATOLOGY | Age: 77
End: 2017-08-21

## 2017-08-21 VITALS
HEIGHT: 63 IN | RESPIRATION RATE: 16 BRPM | WEIGHT: 158 LBS | OXYGEN SATURATION: 95 % | TEMPERATURE: 98.1 F | BODY MASS INDEX: 28 KG/M2 | SYSTOLIC BLOOD PRESSURE: 145 MMHG | DIASTOLIC BLOOD PRESSURE: 91 MMHG | HEART RATE: 106 BPM

## 2017-08-21 DIAGNOSIS — D50.9 IRON DEFICIENCY ANEMIA, UNSPECIFIED IRON DEFICIENCY ANEMIA TYPE: Primary | ICD-10-CM

## 2017-08-21 DIAGNOSIS — M05.79 SEROPOSITIVE RHEUMATOID ARTHRITIS OF MULTIPLE SITES (HCC): ICD-10-CM

## 2017-08-21 RX ORDER — HYDROCODONE BITARTRATE AND ACETAMINOPHEN 5; 325 MG/1; MG/1
1 TABLET ORAL 4 TIMES DAILY
COMMUNITY

## 2017-08-21 RX ORDER — BISMUTH SUBSALICYLATE 262 MG
1 TABLET,CHEWABLE ORAL DAILY
COMMUNITY

## 2017-08-21 RX ORDER — MUPIROCIN CALCIUM 20 MG/G
CREAM TOPICAL
Status: ON HOLD | COMMUNITY
End: 2018-07-07

## 2017-08-21 NOTE — MR AVS SNAPSHOT
Visit Information Date & Time Provider Department Dept. Phone Encounter #  
 8/21/2017  1:20 PM Elizabet Badillo MD [address] [phone] [id number] Follow-up Instructions Return in about 3 months (around 11/21/2017). Upcoming Health Maintenance Date Due DTaP/Tdap/Td series (1 - Tdap) 12/26/1961 ZOSTER VACCINE AGE 60> 10/26/2002 GLAUCOMA SCREENING Q2Y 12/26/2007 Pneumococcal 65+ Low/Medium Risk (1 of 2 - PCV13) 12/26/2007 MEDICARE YEARLY EXAM 12/26/2007 INFLUENZA AGE 9 TO ADULT 8/1/2017 FOBT Q 1 YEAR AGE 50-75 2/25/2018 Allergies as of 8/21/2017  Review Complete On: 8/21/2017 By: Jigar Stephenson LPN Severity Noted Reaction Type Reactions Codeine  01/17/2017    Nausea Only Current Immunizations  Reviewed on 2/25/2017 No immunizations on file. Not reviewed this visit You Were Diagnosed With   
  
 Codes Comments Iron deficiency anemia, unspecified iron deficiency anemia type    -  Primary ICD-10-CM: D50.9 ICD-9-CM: 280.9 Seropositive rheumatoid arthritis of multiple sites [facility])     ICD-10-CM: M05.79 ICD-9-CM: 714.0 Vitals BP Pulse Temp Resp Height(growth percentile) Weight(growth percentile) (!) 145/91 (BP 1 Location: Left arm, BP Patient Position: Sitting) (!) 106 98.1 °F (36.7 °C) (Oral) 16 5' 2.5\" (1.588 m) 158 lb (71.7 kg) SpO2 BMI OB Status Smoking Status 95% 28.44 kg/m2 Postmenopausal Former Smoker Vitals History BMI and BSA Data Body Mass Index Body Surface Area  
 28.44 kg/m 2 1.78 m 2 Preferred Pharmacy Pharmacy Name Phone [address] [facility] [phone] Your Updated Medication List  
  
   
This list is accurate as of: 8/21/17  1:55 PM.  Always use your most recent med list.  
  
  
  
  
 adalimumab 40 mg/0.8 mL injection pen Commonly known as:  HUMIRA PEN  
 0.8 mL by SubCUTAneous route every seven (7) days. BACTROBAN 2 % topical cream  
Generic drug:  mupirocin calcium Apply  to affected area nightly. clonazePAM 0.5 mg tablet Commonly known as:  Herminia Rex Take 0.5 mg by mouth three (3) times daily. docusate sodium 100 mg capsule Commonly known as:  Elsa Petrin Take 100 mg by mouth two (2) times a day. ferrous sulfate 325 mg (65 mg iron) tablet Commonly known as:  Iron (Ferrous Sulfate) Take 1 Tab by mouth two (2) times a day. fluticasone 50 mcg/actuation nasal spray Commonly known as:  FLONASE  
  
 folic acid 1 mg tablet Commonly known as:  Google Take  by mouth daily. HYDROcodone-acetaminophen 5-325 mg per tablet Commonly known as:  [place] No Take  by mouth every six (6) hours as needed for Pain. ibuprofen 800 mg tablet Commonly known as:  MOTRIN  
  
 levothyroxine 25 mcg tablet Commonly known as:  SYNTHROID  
  
 lovastatin 40 mg tablet Commonly known as:  MEVACOR Take 40 mg by mouth daily. methotrexate 2.5 mg tablet Commonly known as:  Stu Shiver Take 7.5 mg by mouth every Sunday. Takes 6 tabs  
  
 metoprolol tartrate 25 mg tablet Commonly known as:  LOPRESSOR Take 25 mg by mouth two (2) times a day. mirtazapine 45 mg tablet Commonly known as:  Kell Nestle Take 45 mg by mouth nightly. multivitamin tablet Commonly known as:  ONE A DAY Take 1 Tab by mouth daily. OLANZapine 15 mg tablet Commonly known as:  ZyPREXA Take 10 mg by mouth nightly. pantoprazole 40 mg tablet Commonly known as:  PROTONIX Take 1 Tab by mouth Daily (before breakfast). polyvinyl alcohol 1.4 % ophthalmic solution Commonly known as:  Garlin Gary Administer 1 Drop to both eyes as needed. prednisoLONE acetate 1 % ophthalmic suspension Commonly known as:  PRED FORTE Administer 1 Drop to left eye three (3) times daily. SENNA Take 2 Tabs by mouth once. traZODone 150 mg tablet Commonly known as:  [place] Odor Take 150 mg by mouth nightly. TYLENOL 325 mg tablet Generic drug:  acetaminophen Take  by mouth every four (4) hours as needed for Pain. venlafaxine- mg capsule Commonly known as:  EFFEXOR-XR Take  by mouth daily. Prescriptions Sent to Pharmacy Refills  
 adalimumab (HUMIRA PEN) 40 mg/0.8 mL injection pen 6 Si.8 mL by SubCUTAneous route every seven (7) days. Class: Normal  
 Pharmacy: [address] #: [phone] Route: SubCUTAneous We Performed the Following C REACTIVE PROTEIN, QT [50729 CPT(R)] CBC+PLATELET+HEM REVIEW [04090 CPT(R)] IRON PROFILE [id number] CPT(R)] METABOLIC PANEL, COMPREHENSIVE [54695 CPT(R)] QUANTIFERON TB GOLD [[id number] Custom] SED RATE (ESR) [id number] CPT(R)] Follow-up Instructions Return in about 3 months (around 2017). Introducing [facility]! Iesha Holm introduces T[employer]rotting patient portal. Now you can access parts of your medical record, email your doctor's office, and request medication refills online. 1. In your internet browser, go to [email or web address] 2. Click on the First Time User? Click Here link in the Sign In box. You will see the New Member Sign Up page. 3. Enter your T[employer]rotting Access Code exactly as it appears below. You will not need to use this code after youve completed the sign-up process. If you do not sign up before the expiration date, you must request a new code. · [employer]ting Access Code: [email or web address] Expires: 2017  1:55 PM 
 
4. Enter the last four digits of your Social Security Number (xxxx) and Date of Birth (mm/dd/yyyy) as indicated and click Submit. You will be taken to the next sign-up page. 5. Create a Triptrotting ID.  This will be your Triptrotting login ID and cannot be changed, so think of one that is secure and easy to remember. 6. Create a [employer] password. You can change your password at any time. 7. Enter your Password Reset Question and Answer. This can be used at a later time if you forget your password. 8. Enter your e-mail address. You will receive e-mail notification when new information is available in [address] 9. Click Sign Up. You can now view and download portions of your medical record. 10. Click the Download Summary menu link to download a portable copy of your medical information. If you have questions, please visit the Frequently Asked Questions section of the [employer] website. Remember, [employer] is NOT to be used for urgent needs. For medical emergencies, dial 911. Now available from your iPhone and Android! Please provide this summary of care documentation to your next provider. Your primary care clinician is listed as Corin Strickland. If you have any questions after today's visit, please call [phone].

## 2017-08-21 NOTE — PROGRESS NOTES
Hugo Hebert is a 78 y.o. female  Chief Complaint   Patient presents with    Arthritis     1. Have you been to the ER, urgent care clinic since your last visit? Hospitalized since your last visit? No    2. Have you seen or consulted any other health care providers outside of the [address] [place] [address] since your last visit? Include any pap smears or colon screening. arvind Richardson, Last seen June 2017 for knees.

## 2017-08-21 NOTE — PROGRESS NOTES
RHEUMATOLOGY PROBLEM LIST AND CHIEF COMPLAINT  1. Rheumatoid arthritis (1995) - polyarthritis, significant deformity of multiple joints with subluxation, positive JOAN (1:160), positive CCP, positive rheumatoid factor    Therapy History:  Prior DMARDs: Remicade (allergy), hydroxychloroquine (not effective)  Current DMARDs: Humira (stopped in 2010, 2015-04/2016, 1/2017-current), methotrexate (current)    2. Osteoarthritis     INTERVAL HISTORY  This is a 78 y.o.  female. Today, the patient complains of pain in the joints. Location: feet, knees, shoulder, ankle  Severity:  8 on a scale of 0-10  Timing: all day   Duration:  2 months  Modifying factors: Humira, Methotrexate  Context/Associated signs and symptoms: The patient reports that she has been in pain for the past two months. She states she takes Humira every two weeks and wants to increase if possible. Son states that her orthopedist took an x-ray in June. X-ray showed no fracture. Patient states her last steroid injection in her was in May 2017, by Dr. Bhavesh Cook. She states the injection from last visit with me didn't help with the pain, but the second one she had a month later helped and continues to help. She states that she has persistent joint stiffness all day. She states the last time she took prednisone po was a long time ago. Patient states she stopped taking methotrexate because of alopecia.      RHEUMATOLOGY REVIEW OF SYSTEMS   Positives as per history  Negatives as follows:  Carlos Marco:  Denies unexplained persistent fevers or weight change  RESPIRATORY:  No pleuritic pain, exertional dyspnea  CARDIOVASCULAR:  Denies chest pain  GASTRO:   Denies heartburn, abdominal pain, nausea, vomiting, diarrhea  SKIN:    Denies rash   MSK:    No morning stiffness >1 hour    PAST MEDICAL HISTORY  Reviewed with patient, significant changes in medical history - no    FAMILY HISTORY  rheumatoid arthritis     PHYSICAL EXAM  Height 5' 2.5\" (1.588 m), weight 158 lb (71.7 kg). GENERAL APPEARANCE: Well-nourished, no acute distress  NECK: No adenopathy, decreased ROM  ENT: No oral ulcers  CARDIOVASCULAR: Heart rhythm is regular. No murmur, rub, gallop  CHEST: Normal vesicular breath sounds. No wheezes, rales, pleural friction rubs  ABDOMINAL: The abdomen is soft and nontender. Bowel sounds are normal  SKIN: No rash, palpable purpura, digital ulcer, abnormal thickening   NEUROLOGICAL: antalgic gait, using walker  MUSCULOSKELETAL:   Upper extremities - ulnar deviation with boutonniere deformity and swan-neck deformity of bilateral hands. Subluxation of joints. Heberden's and Vivi's nodes noted. Wrist decreased ROM B/L. R wrist warmth, with tenderness. Lower extremities - bony prominence and tenderness over bilateral knee joints R>L. Right knee mild warmth, mild effusion. Right ankle swelling - improved. Left ankle swelling - worse. LABS, RADIOLOGY AND PROCEDURES   Previous labs reviewed - No new labs    ASSESSMENT  1. Rheumatoid arthritis (Established problem -  Progressive disease) - The patient isto continue on Humira. I think she's flaring a little due to cessation of methotrexate. I want her to increase Humira 40 mg q1 week. Will get lab work today CBC, CMP, ESR, TB test, Fe panel. Patient to remain off of methotrexate. She should return in 4 months for a follow up. I have recommended seeing hematology if her blood count and anemia issues do not improve. 2. Keratitis - (Established problem -  Stable disease) - Her left eye keratitis is stable. 3. Osteoarthritis - This likely characterizes pain in her hips, knees, and shoulder. OTC meds and tramadol are recommended. 4. Bone density - bone scan ordered in 12/2015 but has not been performed, she currently uses vitamin D supplementation. PLAN  1. Increase Humira 40 mg every 7 days  2. Remain off methotrexate  3. Labs today - CBC, CMP, ESR, TB test, Fe panel  4. Return in 4 months    Ada Rucker MD  Adult and Pediatric Rheumatology     [facility] [facility]ritis [facility] Osteoporosis [facility], [address], Phone [phone], Fax [phone]     Visiting  of Pediatrics    Department of Pediatrics, [facility] of [address] [place] [address], [address] [place] [address], Phone [phone], Fax [phone]    There are no Patient Instructions on file for this visit. cc:  Jeanmarie Keen MD    Written by blanco Farley, as dictated by Felix Roberto. Sonia Rucker M.D. Total face-to face time was 40 minutes, greater than 50% of which was spent in counseling and coordination of care. The diagnosis, treatment and various other items were discussed in detail: Test results, medication options, possible side effects, lifestyle changes.

## 2017-08-22 LAB
ALBUMIN SERPL-MCNC: 3.9 G/DL (ref 3.5–4.8)
ALBUMIN/GLOB SERPL: 1.2 {RATIO} (ref 1.2–2.2)
ALP SERPL-CCNC: 100 IU/L (ref 39–117)
ALT SERPL-CCNC: 9 IU/L (ref 0–32)
AST SERPL-CCNC: 17 IU/L (ref 0–40)
BASOPHILS # BLD MANUAL: 0 X10E3/UL (ref 0–0.2)
BASOPHILS NFR BLD MANUAL: 0 %
BILIRUB SERPL-MCNC: <0.2 MG/DL (ref 0–1.2)
BUN SERPL-MCNC: 11 MG/DL (ref 8–27)
BUN/CREAT SERPL: 20 (ref 12–28)
CALCIUM SERPL-MCNC: 9.3 MG/DL (ref 8.7–10.3)
CHLORIDE SERPL-SCNC: 101 MMOL/L (ref 96–106)
CO2 SERPL-SCNC: 25 MMOL/L (ref 18–29)
CREAT SERPL-MCNC: 0.56 MG/DL (ref 0.57–1)
CRP SERPL-MCNC: 12.5 MG/L (ref 0–4.9)
DIFFERENTIAL COMMENT, 115260: NORMAL
EOSINOPHIL # BLD MANUAL: 0.1 X10E3/UL (ref 0–0.4)
EOSINOPHIL NFR BLD MANUAL: 1 %
ERYTHROCYTE [DISTWIDTH] IN BLOOD BY AUTOMATED COUNT: 13.7 % (ref 12.3–15.4)
ERYTHROCYTE [SEDIMENTATION RATE] IN BLOOD BY WESTERGREN METHOD: 49 MM/HR (ref 0–40)
GLOBULIN SER CALC-MCNC: 3.2 G/DL (ref 1.5–4.5)
GLUCOSE SERPL-MCNC: 116 MG/DL (ref 65–99)
HCT VFR BLD AUTO: 36.7 % (ref 34–46.6)
HGB BLD-MCNC: 11.9 G/DL (ref 11.1–15.9)
IRON SATN MFR SERPL: 26 % (ref 15–55)
IRON SERPL-MCNC: 54 UG/DL (ref 27–139)
LYMPHOCYTES # BLD MANUAL: 1.2 X10E3/UL (ref 0.7–3.1)
LYMPHOCYTES NFR BLD MANUAL: 16 %
MCH RBC QN AUTO: 31.5 PG (ref 26.6–33)
MCHC RBC AUTO-ENTMCNC: 32.4 G/DL (ref 31.5–35.7)
MCV RBC AUTO: 97 FL (ref 79–97)
MONOCYTES # BLD MANUAL: 0.5 X10E3/UL (ref 0.1–0.9)
MONOCYTES NFR BLD MANUAL: 6 %
NEUTROPHILS # BLD MANUAL: 5.9 X10E3/UL (ref 1.4–7)
NEUTROPHILS NFR BLD MANUAL: 77 %
PLATELET # BLD AUTO: 306 X10E3/UL (ref 150–379)
PLATELET BLD QL SMEAR: ADEQUATE
POTASSIUM SERPL-SCNC: 4.4 MMOL/L (ref 3.5–5.2)
PROT SERPL-MCNC: 7.1 G/DL (ref 6–8.5)
RBC # BLD AUTO: 3.78 X10E6/UL (ref 3.77–5.28)
RBC MORPH BLD: NORMAL
SODIUM SERPL-SCNC: 141 MMOL/L (ref 134–144)
TIBC SERPL-MCNC: 205 UG/DL (ref 250–450)
UIBC SERPL-MCNC: 151 UG/DL (ref 118–369)
WBC # BLD AUTO: 7.7 X10E3/UL (ref 3.4–10.8)

## 2017-08-24 LAB
ANNOTATION COMMENT IMP: NORMAL
GAMMA INTERFERON BACKGROUND BLD IA-ACNC: 0.03 IU/ML
M TB IFN-G BLD-IMP: NEGATIVE
M TB IFN-G CD4+ BCKGRND COR BLD-ACNC: 0 IU/ML
M TB IFN-G CD4+ T-CELLS BLD-ACNC: 0.03 IU/ML
MITOGEN IGNF BLD-ACNC: 9.29 IU/ML
QUANTIFERON INCUBATION: NORMAL
SERVICE CMNT-IMP: NORMAL

## 2017-11-03 ENCOUNTER — TELEPHONE (OUTPATIENT)
Dept: RHEUMATOLOGY | Age: 77
End: 2017-11-03

## 2017-11-03 NOTE — TELEPHONE ENCOUNTER
HIPAA verified by two patient identifiers. Spoke with April (nurse) and explained that those are not side effects of Humaria and to try the biweekly  To see if patient feels better, but it will not control the RA,  and to please call office with update.

## 2017-11-03 NOTE — TELEPHONE ENCOUNTER
HIPAA verified by two patient identifiers. Spoke with April from [facility] # [phone]., they are wanting to know if patient can go to bi-weekly with the Humira ,due to patient complaining of hot flush face and rapid heart beat, instead of weekly. Message forwarded to Alfredo Denver.

## 2017-12-13 ENCOUNTER — TELEPHONE (OUTPATIENT)
Dept: RHEUMATOLOGY | Age: 77
End: 2017-12-13

## 2017-12-13 NOTE — TELEPHONE ENCOUNTER
April from [facility], LXRNJ-[phone], Fax  [phone], needs a refill order for Humira.

## 2017-12-14 NOTE — TELEPHONE ENCOUNTER
Spoke with April from [facility] patient needed a refill on her Humira they have contacted [facility] and patient should receive it today.

## 2018-01-10 ENCOUNTER — TELEPHONE (OUTPATIENT)
Dept: RHEUMATOLOGY | Age: 78
End: 2018-01-10

## 2018-01-10 NOTE — TELEPHONE ENCOUNTER
----- Message from Gopi Jimmy sent at 1/10/2018 10:22 AM EST -----  Regarding: FW: [place]/telephone      ----- Message -----     From: Mayank Jones     Sent: 1/5/2018   1:04 PM       To: [facility] Front Office Pool  Subject: [place]/telephone                                  Rashmidavey Koorenetta with [address] stated [address] will no longer cover the [facility]. She is requesting for you to call her. Ismael Ship number is [phone] ext [phone].

## 2018-01-10 NOTE — TELEPHONE ENCOUNTER
Spoke to Alfred Eddy at [facility], and she states they just shipped Humira on 1/4/2018, and will continue to send the patient her Humira. Spoke with Felix Powers at [facility], and they did received the Humira. Humira has been approved by [employer], [id number], good 10/6/2017-1/3/2021.

## 2018-01-18 ENCOUNTER — TELEPHONE (OUTPATIENT)
Dept: RHEUMATOLOGY | Age: 78
End: 2018-01-18

## 2018-01-18 NOTE — TELEPHONE ENCOUNTER
Attempted to contact, but mobile number not working and was unable to get through other number in chart. PA approved for humira pen 10/06/17 - 01/03/2021 through [employer].

## 2018-01-25 ENCOUNTER — TELEPHONE (OUTPATIENT)
Dept: RHEUMATOLOGY | Age: 78
End: 2018-01-25

## 2018-01-25 NOTE — TELEPHONE ENCOUNTER
----- Message from Dean Griffin sent at 1/24/2018  2:27 PM EST -----  Regarding: /Telephone  Reva,daughter is returning a call in reference to the pt's medication. Best contact number is [phone].

## 2018-06-19 ENCOUNTER — TELEPHONE (OUTPATIENT)
Dept: RHEUMATOLOGY | Age: 78
End: 2018-06-19

## 2018-07-04 ENCOUNTER — APPOINTMENT (OUTPATIENT)
Dept: GENERAL RADIOLOGY | Age: 78
End: 2018-07-04
Attending: EMERGENCY MEDICINE
Payer: MEDICARE

## 2018-07-04 ENCOUNTER — APPOINTMENT (OUTPATIENT)
Dept: CT IMAGING | Age: 78
End: 2018-07-04
Attending: EMERGENCY MEDICINE
Payer: MEDICARE

## 2018-07-04 ENCOUNTER — HOSPITAL ENCOUNTER (EMERGENCY)
Age: 78
Discharge: NURSING, REHAB OR LONG-TERM CARE | End: 2018-07-05
Attending: EMERGENCY MEDICINE | Admitting: EMERGENCY MEDICINE
Payer: MEDICARE

## 2018-07-04 DIAGNOSIS — S01.81XA FACIAL LACERATION, INITIAL ENCOUNTER: ICD-10-CM

## 2018-07-04 DIAGNOSIS — S60.222A CONTUSION OF LEFT HAND, INITIAL ENCOUNTER: ICD-10-CM

## 2018-07-04 DIAGNOSIS — T14.8XXA ABRASION: ICD-10-CM

## 2018-07-04 DIAGNOSIS — E87.1 HYPONATREMIA: ICD-10-CM
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
## 2018-07-04 LAB
ALBUMIN SERPL-MCNC: 3 G/DL (ref 3.5–5)
ALBUMIN/GLOB SERPL: 0.7 {RATIO} (ref 1.1–2.2)
ALP SERPL-CCNC: 88 U/L (ref 45–117)
ALT SERPL-CCNC: 16 U/L (ref 12–78)
ANION GAP SERPL CALC-SCNC: 11 MMOL/L (ref 5–15)
APPEARANCE UR: CLEAR
AST SERPL-CCNC: 21 U/L (ref 15–37)
BACTERIA URNS QL MICRO: NEGATIVE /HPF
BASOPHILS # BLD: 0 K/UL (ref 0–0.1)
BASOPHILS NFR BLD: 0 % (ref 0–1)
BILIRUB SERPL-MCNC: 0.3 MG/DL (ref 0.2–1)
BILIRUB UR QL: NEGATIVE
BUN SERPL-MCNC: 10 MG/DL (ref 6–20)
BUN/CREAT SERPL: 21 (ref 12–20)
CALCIUM SERPL-MCNC: 8.4 MG/DL (ref 8.5–10.1)
CHLORIDE SERPL-SCNC: 86 MMOL/L (ref 97–108)
CO2 SERPL-SCNC: 26 MMOL/L (ref 21–32)
COLOR UR: ABNORMAL
CREAT SERPL-MCNC: 0.47 MG/DL (ref 0.55–1.02)
DIFFERENTIAL METHOD BLD: ABNORMAL
EOSINOPHIL # BLD: 0.1 K/UL (ref 0–0.4)
EOSINOPHIL NFR BLD: 1 % (ref 0–7)
EPITH CASTS URNS QL MICRO: ABNORMAL /LPF
ERYTHROCYTE [DISTWIDTH] IN BLOOD BY AUTOMATED COUNT: 12.3 % (ref 11.5–14.5)
GLOBULIN SER CALC-MCNC: 4.3 G/DL (ref 2–4)
GLUCOSE SERPL-MCNC: 110 MG/DL (ref 65–100)
GLUCOSE UR STRIP.AUTO-MCNC: NEGATIVE MG/DL
HCT VFR BLD AUTO: 31.9 % (ref 35–47)
HGB BLD-MCNC: 11.1 G/DL (ref 11.5–16)
HGB UR QL STRIP: NEGATIVE
HYALINE CASTS URNS QL MICRO: ABNORMAL /LPF (ref 0–5)
IMM GRANULOCYTES # BLD: 0.1 K/UL (ref 0–0.04)
IMM GRANULOCYTES NFR BLD AUTO: 1 % (ref 0–0.5)
KETONES UR QL STRIP.AUTO: NEGATIVE MG/DL
LEUKOCYTE ESTERASE UR QL STRIP.AUTO: ABNORMAL
LYMPHOCYTES # BLD: 1.1 K/UL (ref 0.8–3.5)
LYMPHOCYTES NFR BLD: 11 % (ref 12–49)
MCH RBC QN AUTO: 32.3 PG (ref 26–34)
MCHC RBC AUTO-ENTMCNC: 34.8 G/DL (ref 30–36.5)
MCV RBC AUTO: 92.7 FL (ref 80–99)
MONOCYTES # BLD: 0.7 K/UL (ref 0–1)
MONOCYTES NFR BLD: 7 % (ref 5–13)
NEUTS SEG # BLD: 8.2 K/UL (ref 1.8–8)
NEUTS SEG NFR BLD: 81 % (ref 32–75)
NITRITE UR QL STRIP.AUTO: NEGATIVE
NRBC # BLD: 0 K/UL (ref 0–0.01)
NRBC BLD-RTO: 0 PER 100 WBC
PH UR STRIP: 7 [PH] (ref 5–8)
PLATELET # BLD AUTO: 326 K/UL (ref 150–400)
PMV BLD AUTO: 9.2 FL (ref 8.9–12.9)
POTASSIUM SERPL-SCNC: 3.6 MMOL/L (ref 3.5–5.1)
PROT SERPL-MCNC: 7.3 G/DL (ref 6.4–8.2)
PROT UR STRIP-MCNC: NEGATIVE MG/DL
RBC # BLD AUTO: 3.44 M/UL (ref 3.8–5.2)
RBC #/AREA URNS HPF: ABNORMAL /HPF (ref 0–5)
SODIUM SERPL-SCNC: 123 MMOL/L (ref 136–145)
SP GR UR REFRACTOMETRY: 1.01 (ref 1–1.03)
UA: UC IF INDICATED,UAUC: ABNORMAL
UROBILINOGEN UR QL STRIP.AUTO: 0.2 EU/DL (ref 0.2–1)
WBC # BLD AUTO: 10.1 K/UL (ref 3.6–11)
WBC URNS QL MICRO: ABNORMAL /HPF (ref 0–4)

## 2018-07-04 PROCEDURE — 73130 X-RAY EXAM OF HAND: CPT

## 2018-07-04 PROCEDURE — 36415 COLL VENOUS BLD VENIPUNCTURE: CPT | Performed by: EMERGENCY MEDICINE

## 2018-07-04 PROCEDURE — 77030031139 HC SUT VCRL2 J&J -A

## 2018-07-04 PROCEDURE — 99285 EMERGENCY DEPT VISIT HI MDM: CPT

## 2018-07-04 PROCEDURE — 85025 COMPLETE CBC W/AUTO DIFF WBC: CPT | Performed by: EMERGENCY MEDICINE

## 2018-07-04 PROCEDURE — 77030018836 HC SOL IRR NACL ICUM -A

## 2018-07-04 PROCEDURE — 70450 CT HEAD/BRAIN W/O DYE: CPT

## 2018-07-04 PROCEDURE — 74011000250 HC RX REV CODE- 250: Performed by: EMERGENCY MEDICINE

## 2018-07-04 PROCEDURE — 81001 URINALYSIS AUTO W/SCOPE: CPT | Performed by: EMERGENCY MEDICINE

## 2018-07-04 PROCEDURE — 77030031132 HC SUT NYL COVD -A

## 2018-07-04 PROCEDURE — 72125 CT NECK SPINE W/O DYE: CPT

## 2018-07-04 PROCEDURE — 74011250637 HC RX REV CODE- 250/637: Performed by: EMERGENCY MEDICINE

## 2018-07-04 PROCEDURE — 75810000294 HC INTERM/LAYERED WND RPR

## 2018-07-04 PROCEDURE — 80053 COMPREHEN METABOLIC PANEL: CPT | Performed by: EMERGENCY MEDICINE

## 2018-07-04 RX ORDER — OXYCODONE AND ACETAMINOPHEN 5; 325 MG/1; MG/1
2 TABLET ORAL
Status: COMPLETED | OUTPATIENT
Start: 2018-07-04 | End: 2018-07-04

## 2018-07-04 RX ORDER — LIDOCAINE HYDROCHLORIDE AND EPINEPHRINE 20; 5 MG/ML; UG/ML
1.5 INJECTION, SOLUTION EPIDURAL; INFILTRATION; INTRACAUDAL; PERINEURAL ONCE
Status: COMPLETED | OUTPATIENT
Start: 2018-07-04 | End: 2018-07-04

## 2018-07-04 RX ADMIN — OXYCODONE HYDROCHLORIDE AND ACETAMINOPHEN 2 TABLET: 5; 325 TABLET ORAL at 23:24

## 2018-07-04 RX ADMIN — LIDOCAINE HYDROCHLORIDE,EPINEPHRINE BITARTRATE 30 MG: 20; .005 INJECTION, SOLUTION EPIDURAL; INFILTRATION; INTRACAUDAL; PERINEURAL at 21:56

## 2018-07-05 VITALS
DIASTOLIC BLOOD PRESSURE: 79 MMHG | HEIGHT: 62 IN | HEART RATE: 116 BPM | SYSTOLIC BLOOD PRESSURE: 156 MMHG | OXYGEN SATURATION: 93 % | WEIGHT: 164.24 LBS | BODY MASS INDEX: 30.22 KG/M2 | RESPIRATION RATE: 15 BRPM | TEMPERATURE: 97.9 F

## 2018-07-05 LAB
ANION GAP SERPL CALC-SCNC: 6 MMOL/L (ref 5–15)
BUN SERPL-MCNC: 8 MG/DL (ref 6–20)
BUN/CREAT SERPL: 19 (ref 12–20)
CALCIUM SERPL-MCNC: 8.3 MG/DL (ref 8.5–10.1)
CHLORIDE SERPL-SCNC: 96 MMOL/L (ref 97–108)
CO2 SERPL-SCNC: 28 MMOL/L (ref 21–32)
CREAT SERPL-MCNC: 0.42 MG/DL (ref 0.55–1.02)
GLUCOSE SERPL-MCNC: 98 MG/DL (ref 65–100)
POTASSIUM SERPL-SCNC: 3.5 MMOL/L (ref 3.5–5.1)
SODIUM SERPL-SCNC: 130 MMOL/L (ref 136–145)

## 2018-07-05 PROCEDURE — 80048 BASIC METABOLIC PNL TOTAL CA: CPT | Performed by: EMERGENCY MEDICINE

## 2018-07-05 PROCEDURE — 96360 HYDRATION IV INFUSION INIT: CPT

## 2018-07-05 PROCEDURE — 74011250636 HC RX REV CODE- 250/636: Performed by: EMERGENCY MEDICINE

## 2018-07-05 PROCEDURE — 36415 COLL VENOUS BLD VENIPUNCTURE: CPT | Performed by: EMERGENCY MEDICINE

## 2018-07-05 RX ADMIN — SODIUM CHLORIDE 1000 ML: 900 INJECTION, SOLUTION INTRAVENOUS at 00:31

## 2018-07-05 NOTE — ED NOTES
Assumed care of pt from Cyndee Coronado.LOBO. Pt resting quietly and in no acute distress at this time. VSS. Call bell within reach.

## 2018-07-05 NOTE — DISCHARGE INSTRUCTIONS
Preventing Falls: Care Instructions  Your Care Instructions    Getting around your home safely can be a challenge if you have injuries or health problems that make it easy for you to fall. Loose rugs and furniture in walkways are among the dangers for many older people who have problems walking or who have poor eyesight. People who have conditions such as arthritis, osteoporosis, or dementia also have to be careful not to fall. You can make your home safer with a few simple measures. Follow-up care is a key part of your treatment and safety. Be sure to make and go to all appointments, and call your doctor if you are having problems. It's also a good idea to know your test results and keep a list of the medicines you take. How can you care for yourself at home? Taking care of yourself  · You may get dizzy if you do not drink enough water. To prevent dehydration, drink plenty of fluids, enough so that your urine is light yellow or clear like water. Choose water and other caffeine-free clear liquids. If you have kidney, heart, or liver disease and have to limit fluids, talk with your doctor before you increase the amount of fluids you drink. · Exercise regularly to improve your strength, muscle tone, and balance. Walk if you can. Swimming may be a good choice if you cannot walk easily. · Have your vision and hearing checked each year or any time you notice a change. If you have trouble seeing and hearing, you might not be able to avoid objects and could lose your balance. · Know the side effects of the medicines you take. Ask your doctor or pharmacist whether the medicines you take can affect your balance. Sleeping pills or sedatives can affect your balance. · Limit the amount of alcohol you drink. Alcohol can impair your balance and other senses. · Ask your doctor whether calluses or corns on your feet need to be removed.  If you wear loose-fitting shoes because of calluses or corns, you can lose your balance and fall. · Talk to your doctor if you have numbness in your feet. Preventing falls at home  · Remove raised doorway thresholds, throw rugs, and clutter. Repair loose carpet or raised areas in the floor. · Move furniture and electrical cords to keep them out of walking paths. · Use nonskid floor wax, and wipe up spills right away, especially on ceramic tile floors. · If you use a walker or cane, put rubber tips on it. If you use crutches, clean the bottoms of them regularly with an abrasive pad, such as steel wool. · Keep your house well lit, especially \[facility]\"", and outside walkways. Use night-lights in areas such as hallways and bathrooms. Add extra light switches or use remote switches (such as switches that go on or off when you clap your hands) to make it easier to turn lights on if you have to get up during the night. · Install sturdy handrails on stairways. · Move items in your cabinets so that the things you use a lot are on the lower shelves (about waist level). · Keep a cordless phone and a flashlight with new batteries by your bed. If possible, put a phone in each of the main rooms of your house, or carry a cell phone in case you fall and cannot reach a phone. Or, you can wear a device around your neck or wrist. You push a button that sends a signal for help. · Wear low-heeled shoes that fit well and give your feet good support. Use footwear with nonskid soles. Check the heels and soles of your shoes for wear. Repair or replace worn heels or soles. · Do not wear socks without shoes on wood floors. · Walk on the grass when the sidewalks are slippery. If you live in an area that gets snow and ice in the winter, sprinkle salt on slippery steps and sidewalks. Preventing falls in the bath  · Install grab bars and nonskid mats inside and outside your shower or tub and near the toilet and sinks. · Use shower chairs and bath benches.   · Use a hand-held shower head that will allow you to sit while showering. · Get into a tub or shower by putting the weaker leg in first. Get out of a tub or shower with your strong side first.  · Repair loose toilet seats and consider installing a raised toilet seat to make getting on and off the toilet easier. · Keep your bathroom door unlocked while you are in the shower. Where can you learn more? Go to http://theo-sav.info/. Enter [id number] [id number] in the search box to learn more about \"Preventing Falls: Care Instructions. \"  Current as of: May 12, 2017  Content Version: 11.4  © 8421-9315 [employer]. Care instructions adapted under license by [employer] (which disclaims liability or warranty for this information). If you have questions about a medical condition or this instruction, always ask your healthcare professional. [place] [address] any warranty or liability for your use of this information. Cuts: Care Instructions  Your Care Instructions  A cut can happen anywhere on your body. Stitches, staples, skin adhesives, or pieces of tape called Steri-Strips are sometimes used to keep the edges of a cut together and help it heal. Steri-Strips can be used by themselves or with stitches or staples. Sometimes cuts are left open. If the cut went deep and through the skin, the doctor may have closed the cut in two layers. A deeper layer of stitches brings the deep part of the cut together. These stitches will dissolve and don't need to be removed. The upper layer closure, which could be stitches, staples, Steri-Strips, or adhesive, is what you see on the cut. A cut is often covered by a bandage. The doctor has checked you carefully, but problems can develop later. If you notice any problems or new symptoms, get medical treatment right away. Follow-up care is a key part of your treatment and safety. Be sure to make and go to all appointments, and call your doctor if you are having problems.  It's also a good idea to know your test results and keep a list of the medicines you take. How can you care for yourself at home? If a cut is open or closed  · Prop up the sore area on a pillow anytime you sit or lie down during the next 3 days. Try to keep it above the level of your heart. This will help reduce swelling. · Keep the cut dry for the first 24 to 48 hours. After this, you can shower if your doctor okays it. Pat the cut dry. · Don't soak the cut, such as in a bathtub. Your doctor will tell you when it's safe to get the cut wet. · After the first 24 to 48 hours, clean the cut with soap and water 2 times a day unless your doctor gives you different instructions. ¨ Don't use hydrogen peroxide or alcohol, which can slow healing. ¨ You may cover the cut with a thin layer of petroleum jelly and a nonstick bandage. ¨ If the doctor put a bandage over the cut, put on a new bandage after cleaning the cut or if the bandage gets wet or dirty. · Avoid any activity that could cause your cut to reopen. · Be safe with medicines. Read and follow all instructions on the label. ¨ If the doctor gave you a prescription medicine for pain, take it as prescribed. ¨ If you are not taking a prescription pain medicine, ask your doctor if you can take an over-the-counter medicine. If the cut is closed with stitches, staples, or Steri-Strips  · Follow the above instructions for open or closed cuts. · Do not remove the stitches or staples on your own. Your doctor will tell you when to come back to have the stitches or staples removed. · Leave Steri-Strips on until they fall off. If the cut is closed with a skin adhesive  · Follow the above instructions for open or closed cuts. · Leave the skin adhesive on your skin until it falls off on its own. This may take 5 to 10 days. · Do not scratch, rub, or pick at the adhesive. · Do not put the sticky part of a bandage directly on the adhesive.   · Do not put any kind of ointment, cream, or lotion over the area. This can make the adhesive fall off too soon. Do not use hydrogen peroxide or alcohol, which can slow healing. When should you call for help? Call your doctor now or seek immediate medical care if:  ? · You have new pain, or your pain gets worse. ? · The skin near the cut is cold or pale or changes color. ? · You have tingling, weakness, or numbness near the cut.   ? · The cut starts to bleed, and blood soaks through the bandage. Oozing small amounts of blood is normal.   ? · You have trouble moving the area near the cut.   ? · You have symptoms of infection, such as:  ¨ Increased pain, swelling, warmth, or redness around the cut. ¨ Red streaks leading from the cut. ¨ Pus draining from the cut. ¨ A fever. ? Watch closely for changes in your health, and be sure to contact your doctor if:  ? · The cut reopens. ? · You do not get better as expected. Where can you learn more? Go to http://theo-sav.info/. Enter M735 in the search box to learn more about \"Cuts: Care Instructions. \"  Current as of: March 20, 2017  Content Version: 11.4  © 2904-3303 [employer]. Care instructions adapted under license by [employer] (which disclaims liability or warranty for this information). If you have questions about a medical condition or this instruction, always ask your healthcare professional. [place] [address] any warranty or liability for your use of this information. Hyponatremia: Care Instructions  Your Care Instructions    Hyponatremia (say \"gr-iy-mpz-TREE-sultana-uh\") means that you don't have enough sodium in your blood. It can cause nausea, vomiting, and headaches. Or you may not feel hungry. In serious cases, it can cause seizures, a coma, or even death. Hyponatremia is not a disease. It is a problem caused by something else, such as medicines or exercising for a long time in hot weather.   You can get hyponatremia if you lose a lot of fluids and then you drink a lot of water or other liquids that don't have much sodium. You can also get it if you have kidney, liver, heart, or other health problems. Treatment is focused on getting your sodium levels back to normal.  Follow-up care is a key part of your treatment and safety. Be sure to make and go to all appointments, and call your doctor if you are having problems. It's also a good idea to know your test results and keep a list of the medicines you take. How can you care for yourself at home? · If your doctor recommends it, drink fluids that have sodium. Sports drinks are a good choice. Or you can eat salty foods. · If your doctor recommends it, limit the amount of water you drink. And limit fluids that are mostly water. These include tea, coffee, and juice. · Take your medicines exactly as prescribed. Call your doctor if you have any problems with your medicine. · Get your sodium levels tested when your doctor tells you to. When should you call for help? Call 911 anytime you think you may need emergency care. For example, call if:  ? · You have a seizure. ? · You passed out (lost consciousness). ?Call your doctor now or seek immediate medical care if:  ? · You are confused or it is hard to focus. ? · You have little or no appetite. ? · You feel sick to your stomach or you vomit. ? · You have a headache. ? · You have mood changes. ? · You feel more tired than usual.   ? Watch closely for changes in your health, and be sure to contact your doctor if:  ? · You do not get better as expected. Where can you learn more? Go to http://theo-sav.info/. Enter K481 in the search box to learn more about \"Hyponatremia: Care Instructions. \"  Current as of: October 14, 2016  Content Version: 11.4  © 3898-1657 [employer].  Care instructions adapted under license by [employer] (which disclaims liability or warranty for this information). If you have questions about a medical condition or this instruction, always ask your healthcare professional. [address] any warranty or liability for your use of this information. Electrolyte Imbalance: Care Instructions  Your Care Instructions  Electrolytes are minerals in your blood. They include sodium, potassium, calcium, and magnesium. When they are not at the right levels, you can feel very ill. You may not know what is causing it, but you know something is wrong. You may feel weak or numb, have muscle spasms, or twitch. Your heart may beat fast. Symptoms are different with each mineral. Too much is as bad as too little. Minerals help keep your body working as it should. Vomiting, diarrhea, and fever can cause you to lose minerals. A problem with your kidneys can tip a mineral out of balance. So can taking certain medicines. Your doctor may do more tests. He or she may change your medicine and diet. If you are low in one or more minerals, they may be given through a tube into your vein (IV). Your doctor may have you take or drink special fluids or pills. If your kidneys are failing, your blood may be filtered. This is called dialysis. It can put the minerals back in balance. Follow-up care is a key part of your treatment and safety. Be sure to make and go to all appointments, and call your doctor if you are having problems. It's also a good idea to know your test results and keep a list of the medicines you take. How can you care for yourself at home? · Take your medicines exactly as prescribed. Call your doctor if you have any problems with your medicines. You will get more details on the specific medicines your doctor prescribes. · Do not take any medicine without talking to your doctor first. This includes prescription, over-the-counter, and herbal medicines.   · If you have kidney, heart, or liver disease and have to limit fluids, talk with your doctor before you increase the amount of fluids you drink. · Your doctor or dietitian may give you a diet plan to help balance your minerals. Follow the diet carefully. When should you call for help? Call 911 anytime you think you may need emergency care. For example, call if:  ? · You passed out (lost consciousness). ? · Your heartbeat seems to be irregular. It might be speeding up and then slowing down or skipping beats. ?Call your doctor now or seek immediate medical care if:  ? · You have muscle aches. ? · You feel very weak. ? · You are confused or cannot think clearly. ? Watch closely for changes in your health, and be sure to contact your doctor if:  ? · You do not get better as expected. Where can you learn more? Go to http://theo-sav.info/. Enter X055 in the search box to learn more about \"Electrolyte Imbalance: Care Instructions. \"  Current as of: May 12, 2017  Content Version: 11.4  © 3357-4344 [employer]. Care instructions adapted under license by [employer] (which disclaims liability or warranty for this information). If you have questions about a medical condition or this instruction, always ask your healthcare professional. [place] [address] any warranty or liability for your use of this information. Hand Bruises: Care Instructions  Your Care Instructions  Bruises, or contusions, can happen as a result of an impact or fall. Most people think of a bruise as a black-and-blue spot. This happens when small blood vessels get torn and leak blood under the skin. The bruise may turn purplish black, reddish blue, or yellowish green as it heals. But bones and muscles can also get bruised. This may damage the hand but not cause a bruise that you can see. Most bruises aren't serious and will go away on their own in 2 to 4 weeks. But sometimes a more serious hand injury might not heal on its own.  Tell your doctor if you have new symptoms or your injury is not getting better over time. You may have tests to see if you have bone or nerve damage. These tests may include X-rays, a CT scan, or an MRI. If you damaged bones or muscles, you may need more treatment. The doctor has checked you carefully, but problems can develop later. If you notice any problems or new symptoms, get medical treatment right away. Follow-up care is a key part of your treatment and safety. Be sure to make and go to all appointments, and call your doctor if you are having problems. It's also a good idea to know your test results and keep a list of the medicines you take. How can you care for yourself at home? · Put ice or a cold pack on the hand for 10 to 20 minutes at a time. Put a thin cloth between the ice and your skin. · Prop up your hand on a pillow when you ice it or anytime you sit or lie down during the next 3 days. Try to keep your hand above the level of your heart. This will help reduce swelling. · Be safe with medicines. Read and follow all instructions on the label. ¨ If the doctor gave you a prescription medicine for pain, take it as prescribed. ¨ If you are not taking a prescription pain medicine, ask your doctor if you can take an over-the-counter medicine. · Be sure to follow your doctor's advice about moving and exercising your injured hand. When should you call for help? Call your doctor now or seek immediate medical care if:  ? · Your pain gets worse. ? · You have new or worse swelling. ? · You have tingling, weakness, or numbness in the area near the bruise. ? · The area near the bruise is cold or pale. ? · You have symptoms of infection, such as:  ¨ Increased pain, swelling, warmth, or redness. ¨ Red streaks leading from the area. ¨ Pus draining from the area. ¨ A fever. ? Watch closely for changes in your health, and be sure to contact your doctor if:  ? · You do not get better as expected.    Where can you learn more?  Go to http://theo-sav.info/. Enter O591 in the search box to learn more about \"Hand Bruises: Care Instructions. \"  Current as of: March 20, 2017  Content Version: 11.4  © 1355-0581 [employer]. Care instructions adapted under license by [employer] (which disclaims liability or warranty for this information). If you have questions about a medical condition or this instruction, always ask your healthcare professional. [address] any warranty or liability for your use of this information. Wound Check: Care Instructions  Your Care Instructions  People have wounds that need care for many reasons. You may have a cut that needs care after surgery. You may have a cut or puncture wound from an accident. Or you may have a wound because of a condition like diabetes. Whatever the cause of your wound, there are things you can do to care for it at home. Your doctor may also want you to come back for a wound check. The wound check lets the doctor know how your wound is healing and if you need more treatment. Follow-up care is a key part of your treatment and safety. Be sure to make and go to all appointments, and call your doctor if you are having problems. It's also a good idea to know your test results and keep a list of the medicines you take. How can you care for yourself at home? · If your doctor told you how to care for your wound, follow your doctor's instructions. If you did not get instructions, follow this general advice:  ¨ You may cover the wound with a thin layer of petroleum jelly, such as Vaseline, and a nonstick bandage. ¨ Apply more petroleum jelly and replace the bandage as needed. · Keep the wound dry for the first 24 to 48 hours. After this, you can shower if your doctor okays it. Pat the wound dry. · Be safe with medicines. Read and follow all instructions on the label.   ¨ If the doctor gave you a prescription medicine for pain, take it as prescribed. ¨ If you are not taking a prescription pain medicine, ask your doctor if you can take an over-the-counter medicine. · If your doctor prescribed antibiotics, take them as directed. Do not stop taking them just because you feel better. You need to take the full course of antibiotics. · If you have stitches, do not remove them on your own. Your doctor will tell you when to come back to have them removed. · If you have Steri-Strips, leave them on until they fall off. · If possible, prop up the injured area on a pillow anytime you sit or lie down during the next 3 days. Try to keep it above the level of your heart. This will help reduce swelling. When should you call for help? Call your doctor now or seek immediate medical care if:  ? · You have new pain, or the pain gets worse. ? · The skin near the wound is cold or pale or changes color. ? · You have tingling, weakness, or numbness near the wound. ? · The wound starts to bleed, and blood soaks through the bandage. Oozing small amounts of blood is normal.   ? · You have symptoms of infection, such as:  ¨ Increased pain, swelling, warmth, or redness. ¨ Red streaks leading from the wound. ¨ Pus draining from the wound. ¨ A fever. ? Watch closely for changes in your health, and be sure to contact your doctor if:  ? · You do not get better as expected. Where can you learn more? Go to http://theo-sav.info/. Enter P342 in the search box to learn more about \"Wound Check: Care Instructions. \"  Current as of: March 20, 2017  Content Version: 11.4  © 9589-1256 [employer]. Care instructions adapted under license by [employer] (which disclaims liability or warranty for this information).  If you have questions about a medical condition or this instruction, always ask your healthcare professional. [address] any warranty or liability for your use of this information.

## 2018-07-05 NOTE — ED NOTES
Verbal report given to [facility]. [facility] transporting pt back to [facility]. Pt in stable condition. VSS. Safety maintained.

## 2018-07-05 NOTE — ED TRIAGE NOTES
Pt arrived via EMS from [facility] for cc fall. Per pt she was sitting on the side of her bed when she fell forward and landed on her face. Pt has large, deep lac above right forehead and small lac on bridge of nose. Nose appears to be swollen and mild bleeding from both nostrils. Pt has posterior aspect of left hand has noted large bruising, pt denies any hand pain. Pt is A&O x4, c/o 9/10 head pain. Pt in no acute distress, VSS.

## 2018-07-05 NOTE — ED NOTES
Set up transport with REBECCA ROMERO 30-40 min. Provider at bedside for dispo and follow up. Discharge plan reviewed and paperwork signed, teaching completed including treatment received, medications and follow up plan of care, pain level within manageable comfortable limits, IV removed, ambulatory to exit, gait steady, safety maintained.

## 2018-07-05 NOTE — ED PROVIDER NOTES
EMERGENCY DEPARTMENT HISTORY AND PHYSICAL EXAM           Date: 7/4/2018  Patient Name: Chi العراقي    History of Presenting Illness     Chief Complaint   Patient presents with    Laceration     forehead laceration s/p fall from bed       History Provided By: Patient and EMS    HPI: Elaine Trevizo is a 78 y.o. female, pmhx HTN / RA, who presents via EMS as transferred from [facility] to the ED c/o laceration to the R side of her forehead with associated HA s/p fall PTA this evening. Pt states she fell out of bed hitting her head on the floor. She denies any LOC at that time. Pt denies any recent medications for her current sxs. She denies the use of any daily anticoagulants. Pt reports a hx of esophageal stenosis and RA. She further notes recently having surgery on her L eye. Pt unaware when her Tetanus was last updated. She otherwise specifically denies any recent fevers, chills, dizziness, lightheadedness, syncope, LOC, nausea, vomiting, diarrhea, abd pain, CP, SOB, urinary sxs, or changes in BM. PCP: Aneudy Hilton MD    PMHx: Significant for RA, baker's cyst, depression, HTN, esophageal stenosis  PSHx: Significant for hernia repair, colonoscopy, bladder tack  Social Hx: -tobacco (former), -EtOH, -Illicit Drugs     There are no other complaints, changes, or physical findings at this time. Current Outpatient Prescriptions   Medication Sig Dispense Refill    HUMIRA PEN 40 mg/0.8 mL injection pen INJECT 40MG(1 PEN) UNDER SKIN EVERY WEEK. 4 Kit 6    mupirocin calcium (BACTROBAN) 2 % topical cream Apply  to affected area nightly.  HYDROcodone-acetaminophen (NORCO) 5-325 mg per tablet Take  by mouth every six (6) hours as needed for Pain.  multivitamin (ONE A DAY) tablet Take 1 Tab by mouth daily.       fluticasone (FLONASE) 50 mcg/actuation nasal spray       ibuprofen (MOTRIN) 800 mg tablet       levothyroxine (SYNTHROID) 25 mcg tablet       OLANZapine (ZYPREXA) 15 mg tablet Take 10 mg by mouth nightly.  ferrous sulfate (IRON, FERROUS SULFATE,) 325 mg (65 mg iron) tablet Take 1 Tab by mouth two (2) times a day. 30 Tab 0    pantoprazole (PROTONIX) 40 mg tablet Take 1 Tab by mouth Daily (before breakfast). 30 Tab 0    traZODone (DESYREL) 150 mg tablet Take 150 mg by mouth nightly.  venlafaxine-SR (EFFEXOR-XR) 150 mg capsule Take  by mouth daily.  folic acid (FOLVITE) 1 mg tablet Take  by mouth daily.  methotrexate (RHEUMATREX) 2.5 mg tablet Take 7.5 mg by mouth every Sunday. Takes 6 tabs       docusate sodium (COLACE) 100 mg capsule Take 100 mg by mouth two (2) times a day.  polyvinyl alcohol (LIQUIFILM TEARS) 1.4 % ophthalmic solution Administer 1 Drop to both eyes as needed.  acetaminophen (TYLENOL) 325 mg tablet Take  by mouth every four (4) hours as needed for Pain.  SENNA Take 2 Tabs by mouth once.  clonazePAM (KLONOPIN) 0.5 mg tablet Take 0.5 mg by mouth three (3) times daily.  lovastatin (MEVACOR) 40 mg tablet Take 40 mg by mouth daily.  metoprolol (LOPRESSOR) 25 mg tablet Take 25 mg by mouth two (2) times a day.  mirtazapine (REMERON) 45 mg tablet Take 45 mg by mouth nightly.  prednisoLONE acetate (PRED FORTE) 1 % ophthalmic suspension Administer 1 Drop to left eye three (3) times daily. Past History     Past Medical History:  Past Medical History:   Diagnosis Date    Baker's cyst of knee     Depression     Hypertension     RA (rheumatoid arthritis) ([facility] [address]        Past Surgical History:  Past Surgical History:   Procedure Laterality Date    COLONOSCOPY N/A 2/28/2017    COLONOSCOPY performed by Jaya Sloan [facility] at [facility] ENDOSCOPY    HX HERNIA REPAIR      HX UROLOGICAL      \"bladder tack\"       Family History:  No family history on file.     Social History:  Social History   Substance Use Topics    Smoking status: Former Smoker    Smokeless tobacco: Never Used    Alcohol use No Allergies: Allergies   Allergen Reactions    Codeine Nausea Only         Review of Systems   Review of Systems   Constitutional: Negative. Negative for fever. Eyes: Negative. Respiratory: Negative. Negative for shortness of breath. Cardiovascular: Negative for chest pain. Gastrointestinal: Negative for abdominal pain, nausea and vomiting. Endocrine: Negative. Genitourinary: Negative. Negative for difficulty urinating, dysuria and hematuria. Musculoskeletal: Negative. Skin: Positive for wound (laceration). Allergic/Immunologic: Negative. Neurological: Negative. No LOC   Psychiatric/Behavioral: Negative for suicidal ideas. All other systems reviewed and are negative. Physical Exam   Physical Exam   Constitutional: She is oriented to person, place, and time. She appears well-developed and well-nourished. No distress. HENT:   Head: Normocephalic. Head is with laceration. Nose: Nose normal.   Eyes: Conjunctivae and EOM are normal. Pupils are equal, round, and reactive to light. No scleral icterus. L eye covered with hard plastic eye patch from recent surgery, pt unable to open her eye, having been that way since before her fall today   Neck: Normal range of motion. No tracheal deviation present. Cardiovascular: Normal rate, regular rhythm, normal heart sounds and intact distal pulses. Exam reveals no friction rub. No murmur heard. Pulmonary/Chest: Effort normal and breath sounds normal. No stridor. No respiratory distress. She has no wheezes. She has no rales. Abdominal: Soft. Bowel sounds are normal. She exhibits no distension. There is no tenderness. There is no rebound. Musculoskeletal: Normal range of motion. She exhibits no tenderness. Pt with severe/chronic degenerative changes to bilateral hands c/w RA   Neurological: She is alert and oriented to person, place, and time. No cranial nerve deficit. Skin: Skin is warm and dry. No rash noted. She is not diaphoretic. There is erythema (Dorsum of L hand). Psychiatric: She has a normal mood and affect. Her speech is normal and behavior is normal. Judgment and thought content normal. Cognition and memory are normal.   Nursing note and vitals reviewed. Diagnostic Study Results     Labs -     Recent Results (from the past 12 hour(s))   METABOLIC PANEL, COMPREHENSIVE    Collection Time: 07/04/18 10:08 PM   Result Value Ref Range    Sodium 123 (L) 136 - 145 mmol/L    Potassium 3.6 3.5 - 5.1 mmol/L    Chloride 86 (L) 97 - 108 mmol/L    CO2 26 21 - 32 mmol/L    Anion gap 11 5 - 15 mmol/L    Glucose 110 (H) 65 - 100 mg/dL    BUN 10 6 - 20 MG/DL    Creatinine 0.47 (L) 0.55 - 1.02 MG/DL    BUN/Creatinine ratio 21 (H) 12 - 20      GFR est AA >60 >60 ml/min/1.73m2    GFR est non-AA >60 >60 ml/min/1.73m2    Calcium 8.4 (L) 8.5 - 10.1 MG/DL    Bilirubin, total 0.3 0.2 - 1.0 MG/DL    ALT (SGPT) 16 12 - 78 U/L    AST (SGOT) 21 15 - 37 U/L    Alk. phosphatase 88 45 - 117 U/L    Protein, total 7.3 6.4 - 8.2 g/dL    Albumin 3.0 (L) 3.5 - 5.0 g/dL    Globulin 4.3 (H) 2.0 - 4.0 g/dL    A-G Ratio 0.7 (L) 1.1 - 2.2     CBC WITH AUTOMATED DIFF    Collection Time: 07/04/18 10:08 PM   Result Value Ref Range    WBC 10.1 3.6 - 11.0 K/uL    RBC 3.44 (L) 3.80 - 5.20 M/uL    HGB 11.1 (L) 11.5 - 16.0 g/dL    HCT 31.9 (L) 35.0 - 47.0 %    MCV 92.7 80.0 - 99.0 FL    MCH 32.3 26.0 - 34.0 PG    MCHC 34.8 30.0 - 36.5 g/dL    RDW 12.3 11.5 - 14.5 %    PLATELET 043 563 - 310 K/uL    MPV 9.2 8.9 - 12.9 FL    NRBC 0.0 0  WBC    ABSOLUTE NRBC 0.00 0.00 - 0.01 K/uL    NEUTROPHILS 81 (H) 32 - 75 %    LYMPHOCYTES 11 (L) 12 - 49 %    MONOCYTES 7 5 - 13 %    EOSINOPHILS 1 0 - 7 %    BASOPHILS 0 0 - 1 %    IMMATURE GRANULOCYTES 1 (H) 0.0 - 0.5 %    ABS. NEUTROPHILS 8.2 (H) 1.8 - 8.0 K/UL    ABS. LYMPHOCYTES 1.1 0.8 - 3.5 K/UL    ABS. MONOCYTES 0.7 0.0 - 1.0 K/UL    ABS. EOSINOPHILS 0.1 0.0 - 0.4 K/UL    ABS.  BASOPHILS 0.0 0.0 - 0.1 K/UL ABS. IMM. GRANS. 0.1 (H) 0.00 - 0.04 K/UL    DF AUTOMATED     URINALYSIS W/ REFLEX CULTURE    Collection Time: 07/04/18 10:48 PM   Result Value Ref Range    Color YELLOW/STRAW      Appearance CLEAR CLEAR      Specific gravity 1.006 1.003 - 1.030      pH (UA) 7.0 5.0 - 8.0      Protein NEGATIVE  NEG mg/dL    Glucose NEGATIVE  NEG mg/dL    Ketone NEGATIVE  NEG mg/dL    Bilirubin NEGATIVE  NEG      Blood NEGATIVE  NEG      Urobilinogen 0.2 0.2 - 1.0 EU/dL    Nitrites NEGATIVE  NEG      Leukocyte Esterase TRACE (A) NEG      WBC 0-4 0 - 4 /hpf    RBC 0-5 0 - 5 /hpf    Epithelial cells FEW FEW /lpf    Bacteria NEGATIVE  NEG /hpf    UA:UC IF INDICATED CULTURE NOT INDICATED BY UA RESULT CNI      Hyaline cast 0-2 0 - 5 /lpf   METABOLIC PANEL, BASIC    Collection Time: 07/05/18  2:39 AM   Result Value Ref Range    Sodium 130 (L) 136 - 145 mmol/L    Potassium 3.5 3.5 - 5.1 mmol/L    Chloride 96 (L) 97 - 108 mmol/L    CO2 28 21 - 32 mmol/L    Anion gap 6 5 - 15 mmol/L    Glucose 98 65 - 100 mg/dL    BUN 8 6 - 20 MG/DL    Creatinine 0.42 (L) 0.55 - 1.02 MG/DL    BUN/Creatinine ratio 19 12 - 20      GFR est AA >60 >60 ml/min/1.73m2    GFR est non-AA >60 >60 ml/min/1.73m2    Calcium 8.3 (L) 8.5 - 10.1 MG/DL       Radiologic Studies -     XR HAND LT MIN 3 V     EXAM:  XR HAND LT MIN 3 V     INDICATION:  Status post fall with posterior left hand bruising.     COMPARISON: None.     FINDINGS: Three views of the left hand demonstrate no fracture, dislocation or  other acute osseous or articular abnormality. The soft tissues are within  normal limits. Chronic appearing subluxations are seen at all MCP joints. Degenerative changes are noted in the radiocarpal joint and carpal joints.     IMPRESSION  IMPRESSION:  No acute abnormality. Chronic appearing subluxations at all MCP  joints compatible with rheumatoid arthritis.        CT Results  (Last 48 hours)               07/04/18 2240  CT HEAD WO CONT Final result    Impression: IMPRESSION: Frontal soft tissue swelling. No acute abnormality. Narrative:  EXAM:  CT HEAD WO CONT       INDICATION:   Status post fall with frontal laceration       COMPARISON: None. CONTRAST:  None. TECHNIQUE: Unenhanced CT of the head was performed using 5 mm images. Brain and   bone windows were generated. CT dose reduction was achieved through use of a   standardized protocol tailored for this examination and automatic exposure   control for dose modulation. FINDINGS:   The ventricles and sulci are normal in size, shape and configuration and   midline. Mild small vessel ischemic changes are seen in the periventricular   white matter. There is no intracranial hemorrhage, extra-axial collection, mass,   mass effect or midline shift. The basilar cisterns are open. No acute infarct   is identified. The bone windows demonstrate no abnormalities. The visualized   portions of the paranasal sinuses and mastoid air cells are clear. Frontal soft   tissue swelling is noted. 07/04/18 2240  CT SPINE CERV WO CONT Final result    Impression:  IMPRESSION:   Spondylitic changes without acute abnormality. Narrative:  EXAM:  CT CERVICAL SPINE WITHOUT CONTRAST       INDICATION:   Neck pain following fall. COMPARISON: None. CONTRAST:  None. TECHNIQUE: Multislice helical CT of the cervical spine was performed without   intravenous contrast administration. Sagittal and coronal reconstructions were   generated. CT dose reduction was achieved through use of a standardized   protocol tailored for this examination and automatic exposure control for dose   modulation. FINDINGS:       The alignment is within normal limits. There is no fracture or subluxation. The   odontoid process is intact. There is osseous fusion at the C1-2 level. The   prevertebral soft tissues are within normal limits. Spondylosis is noted at   C3-4, C4-5, C5-6, and C6-7.  Posterior osteophyte/disc bulge complexes are   largest at C5-6 and C6-7 causing mild-to-moderate spinal stenosis. Bilateral   neural foraminal narrowing is also noted at these levels secondary to   uncovertebral osteophyte formation. Medical Decision Making   I am the first provider for this patient. I reviewed the vital signs, available nursing notes, past medical history, past surgical history, family history and social history. Vital Signs-Reviewed the patient's vital signs. Patient Vitals for the past 12 hrs:   Temp Pulse Resp BP SpO2   07/05/18 0330 - (!) 116 15 156/79 93 %   07/05/18 0325 - 97 15 - 100 %   07/05/18 0315 - 93 16 - 100 %   07/05/18 0300 - 95 18 129/72 100 %   07/05/18 0230 97.9 °F (36.6 °C) 89 16 114/73 98 %   07/05/18 0200 - 95 16 148/70 99 %   07/05/18 0130 - 89 15 139/69 100 %   07/05/18 0100 - 96 17 125/70 99 %   07/05/18 0030 - 99 17 138/70 99 %   07/04/18 2215 - 96 16 146/70 98 %   07/04/18 2200 - 94 22 148/82 99 %   07/04/18 2159 - 95 19 - 98 %   07/04/18 2147 - 98 - - -   07/04/18 2146 97.1 °F (36.2 °C) - 16 144/88 97 %   07/04/18 2145 - 98 24 144/88 96 %   07/04/18 2133 - - - 145/84 -       Pulse Oximetry Analysis - 98% on RA    Cardiac Monitor:   Rate: 100 bpm  Rhythm: Normal Sinus Rhythm     Records Reviewed: Nursing Notes, Old Medical Records, Previous electrocardiograms, Ambulance Run Sheet, Previous Radiology Studies and Previous Laboratory Studies    Provider Notes (Medical Decision Making):     DDX:  Laceration, ich, hand fracture, contusions, c spine injury, uti    Plan:  Head/neck ct, ua, hand xray, laceration repair, analgesic    Impression:  Facial laceration, hand contusion    ED Course:   Initial assessment performed. The patients presenting problems have been discussed, and they are in agreement with the care plan formulated and outlined with them. I have encouraged them to ask questions as they arise throughout their visit.     I reviewed our electronic medical record system for any past medical records that were available that may contribute to the patients current condition, the nursing notes and and vital signs from today's visit    Nursing notes will be reviewed as they become available in realtime while the pt has been in the ED. Carri Winchester MD      I personally reviewed pt's imaging. Official read by radiology noted above. Carri Winchester MD    PROGRESS NOTE:  12:07 AM  Pt resting comfortably at this time. Pt's son notes the pt had a recent corneal transplant. He states the pt has been c/o drainage from her eye following surgery, but followed up with the surgeon on 6/03/18 at which time he said everything appeared WNL without infection. Family states the pt's nursing home has mentioned the pt had recent swallowing issues with a decreased PO intake. Pt scheduled for an upcoming EGD and swallow study and is currently on a pureed diet. Family reports she was previously rx'd Seroquel, but began to have hallucinations and was subsequently taken off. Family updated on all available lab and imaging findings at this time. Will repeat labs following IVF. Written by CHACHA Laibjosé miguel, as dictated by Carri Winchester MD      Procedure Note - Laceration Repair:  12:43 AM  Procedure by Carri Winchester MD.  Complexity: complex  8cm linear laceration to R forehead was irrigated copiously with NS under jet lavage, prepped with Shur Clens and draped in a sterile fashion. The area was anesthetized with 8 mLs of  Lidocaine 1% with epinephrine via local infiltration. The wound was explored with the following results: No foreign bodies found. The wound was repaired with Two layer suture closure: Subcutaneous Layer:  3 sutures placed, stitch type:simple interrupted, suture: 4-0 vicryl. Skin Layer:  1 sutures placed, stitch type:running locked, suture: 5-0 nylon. The wound was closed with good hemostasis and approximation.   Sterile dressing applied. Estimated blood loss: 3-5mL  The procedure took 16-30 minutes, and pt tolerated well.    3:16 AM  Progress note:  Pt noted to be feeling better, sodium improved to 130, ready for discharge. Discussed lab and imaging findings with pt and/or family, specifically noting no acute abnormalities on imaging studies. Pt will follow up as instructed. All questions have been answered, pt voiced understanding and agreement with plan. If narcotics were prescribed, pt was advised not to drive or operate heavy machinery. If abx were prescribed, pt advised that diarrhea and rash are possible side effects of the medications. Specific return precautions provided in addition to instructions for pt to return to the ED immediately should sx worsen at any time. Wesley Au MD      Diagnosis     Clinical Impression:   1. Fall, initial encounter    2. Hyponatremia    3. Facial laceration, initial encounter    4. Abrasion    5. Contusion of left hand, initial encounter        PLAN:  1. Discharge Medication List as of 7/5/2018  3:16 AM        2. Follow-up Information     Follow up With Details Comments [id number] Sameer Jennings MD Schedule an appointment as soon as possible for a visit in 2 days needs to have sutures removed in 1 week [address]  [address] [facility]  [phone]          Return to ED if worse     Disposition:    DISCHARGE NOTE:  3:16 AM  The patient's results have been reviewed with family and/or caregiver. They verbally convey their understanding and agreement of the patient's signs, symptoms, diagnosis, treatment, and prognosis. They additionally agree to follow up as recommended in the discharge instructions or to return to the Emergency Room should the patient's condition change prior to their follow-up appointment. The family and/or caregiver verbally agrees with the care-plan and all of their questions have been answered.  The discharge instructions have also been provided to the them along with educational information regarding the patient's diagnosis and a list of reasons why the patient would want to return to the ER prior to their follow-up appointment should their condition change. Written by Deanna Marie ED Scribe, as dictated by Denise Frye MD.             Attestations: This note is prepared by Deanna Marie, acting as Scribe for MD Denise Goetz MD : The scribe's documentation has been prepared under my direction and personally reviewed by me in its entirety. I confirm that the note above accurately reflects all work, treatment, procedures, and medical decision making performed by me. This note will not be viewable in [address]

## 2018-07-06 ENCOUNTER — HOSPITAL ENCOUNTER (OUTPATIENT)
Dept: GENERAL RADIOLOGY | Age: 78
Discharge: HOME | End: 2018-07-06
Attending: INTERNAL MEDICINE
Payer: MEDICARE

## 2018-07-06 DIAGNOSIS — R13.10 DIFFICULTY SWALLOWING: ICD-10-CM

## 2018-07-06 PROCEDURE — 74220 X-RAY XM ESOPHAGUS 1CNTRST: CPT

## 2018-07-07 ENCOUNTER — APPOINTMENT (OUTPATIENT)
Dept: CT IMAGING | Age: 78
DRG: 872 | End: 2018-07-07
Attending: EMERGENCY MEDICINE
Payer: MEDICARE

## 2018-07-07 ENCOUNTER — APPOINTMENT (OUTPATIENT)
Dept: GENERAL RADIOLOGY | Age: 78
DRG: 872 | End: 2018-07-07
Attending: EMERGENCY MEDICINE
Payer: MEDICARE

## 2018-07-07 ENCOUNTER — HOSPITAL ENCOUNTER (INPATIENT)
Age: 78
LOS: 2 days | Discharge: NURSING, REHAB OR LONG-TERM CARE | DRG: 872 | End: 2018-07-11
Attending: EMERGENCY MEDICINE | Admitting: INTERNAL MEDICINE
Payer: MEDICARE

## 2018-07-07 DIAGNOSIS — N30.00 ACUTE CYSTITIS WITHOUT HEMATURIA: Primary | ICD-10-CM

## 2018-07-07 DIAGNOSIS — R07.81 PLEURITIC CHEST PAIN: ICD-10-CM

## 2018-07-07 DIAGNOSIS — A41.9 SEPSIS, DUE TO UNSPECIFIED ORGANISM: ICD-10-CM

## 2018-07-07 PROBLEM — M06.9 RA (RHEUMATOID ARTHRITIS) (HCC): Chronic | Status: ACTIVE | Noted: 2018-07-07

## 2018-07-07 PROBLEM — I10 HYPERTENSION: Chronic | Status: ACTIVE | Noted: 2018-07-07

## 2018-07-07 PROBLEM — F32.A DEPRESSION: Chronic | Status: ACTIVE | Noted: 2018-07-07

## 2018-07-07 PROBLEM — F41.9 ANXIETY: Status: ACTIVE | Noted: 2018-07-07

## 2018-07-07 PROBLEM — R55 SYNCOPE: Status: RESOLVED | Noted: 2017-02-24 | Resolved: 2018-07-07

## 2018-07-07 LAB
ALBUMIN SERPL-MCNC: 3.2 G/DL (ref 3.5–5)
ALBUMIN/GLOB SERPL: 0.8 {RATIO} (ref 1.1–2.2)
ALP SERPL-CCNC: 91 U/L (ref 45–117)
ALT SERPL-CCNC: 17 U/L (ref 12–78)
ANION GAP SERPL CALC-SCNC: 7 MMOL/L (ref 5–15)
APPEARANCE UR: ABNORMAL
AST SERPL-CCNC: 16 U/L (ref 15–37)
ATRIAL RATE: 96 BPM
BACTERIA URNS QL MICRO: ABNORMAL /HPF
BASOPHILS # BLD: 0 K/UL (ref 0–0.1)
BASOPHILS NFR BLD: 0 % (ref 0–1)
BILIRUB SERPL-MCNC: 0.4 MG/DL (ref 0.2–1)
BILIRUB UR QL: NEGATIVE
BUN SERPL-MCNC: 13 MG/DL (ref 6–20)
BUN/CREAT SERPL: 24 (ref 12–20)
CALCIUM SERPL-MCNC: 8.9 MG/DL (ref 8.5–10.1)
CALCULATED P AXIS, ECG09: 39 DEGREES
CALCULATED R AXIS, ECG10: -26 DEGREES
CALCULATED T AXIS, ECG11: 7 DEGREES
CHLORIDE SERPL-SCNC: 95 MMOL/L (ref 97–108)
CO2 SERPL-SCNC: 30 MMOL/L (ref 21–32)
COLOR UR: ABNORMAL
CREAT SERPL-MCNC: 0.54 MG/DL (ref 0.55–1.02)
D DIMER PPP FEU-MCNC: 1.78 MG/L FEU (ref 0–0.65)
DIAGNOSIS, 93000: NORMAL
DIFFERENTIAL METHOD BLD: ABNORMAL
EOSINOPHIL # BLD: 0.2 K/UL (ref 0–0.4)
EOSINOPHIL NFR BLD: 2 % (ref 0–7)
EPITH CASTS URNS QL MICRO: ABNORMAL /LPF
ERYTHROCYTE [DISTWIDTH] IN BLOOD BY AUTOMATED COUNT: 12.7 % (ref 11.5–14.5)
GLOBULIN SER CALC-MCNC: 4.2 G/DL (ref 2–4)
GLUCOSE SERPL-MCNC: 101 MG/DL (ref 65–100)
GLUCOSE UR STRIP.AUTO-MCNC: NEGATIVE MG/DL
HCT VFR BLD AUTO: 32.3 % (ref 35–47)
HGB BLD-MCNC: 11 G/DL (ref 11.5–16)
HGB UR QL STRIP: NEGATIVE
HYALINE CASTS URNS QL MICRO: ABNORMAL /LPF (ref 0–5)
IMM GRANULOCYTES # BLD: 0 K/UL (ref 0–0.04)
IMM GRANULOCYTES NFR BLD AUTO: 0 % (ref 0–0.5)
KETONES UR QL STRIP.AUTO: NEGATIVE MG/DL
LACTATE BLD-SCNC: 2.5 MMOL/L (ref 0.4–2)
LACTATE SERPL-SCNC: 1.5 MMOL/L (ref 0.4–2)
LEUKOCYTE ESTERASE UR QL STRIP.AUTO: ABNORMAL
LYMPHOCYTES # BLD: 1.4 K/UL (ref 0.8–3.5)
LYMPHOCYTES NFR BLD: 15 % (ref 12–49)
MCH RBC QN AUTO: 32.3 PG (ref 26–34)
MCHC RBC AUTO-ENTMCNC: 34.1 G/DL (ref 30–36.5)
MCV RBC AUTO: 94.7 FL (ref 80–99)
MONOCYTES # BLD: 0.7 K/UL (ref 0–1)
MONOCYTES NFR BLD: 8 % (ref 5–13)
NEUTS SEG # BLD: 6.8 K/UL (ref 1.8–8)
NEUTS SEG NFR BLD: 75 % (ref 32–75)
NITRITE UR QL STRIP.AUTO: NEGATIVE
NRBC # BLD: 0 K/UL (ref 0–0.01)
NRBC BLD-RTO: 0 PER 100 WBC
P-R INTERVAL, ECG05: 150 MS
PH UR STRIP: 7.5 [PH] (ref 5–8)
PLATELET # BLD AUTO: 302 K/UL (ref 150–400)
PMV BLD AUTO: 9.7 FL (ref 8.9–12.9)
POTASSIUM SERPL-SCNC: 3.8 MMOL/L (ref 3.5–5.1)
PROT SERPL-MCNC: 7.4 G/DL (ref 6.4–8.2)
PROT UR STRIP-MCNC: NEGATIVE MG/DL
Q-T INTERVAL, ECG07: 350 MS
QRS DURATION, ECG06: 88 MS
QTC CALCULATION (BEZET), ECG08: 442 MS
RBC # BLD AUTO: 3.41 M/UL (ref 3.8–5.2)
RBC #/AREA URNS HPF: ABNORMAL /HPF (ref 0–5)
SODIUM SERPL-SCNC: 132 MMOL/L (ref 136–145)
SP GR UR REFRACTOMETRY: 1.02 (ref 1–1.03)
TROPONIN I SERPL-MCNC: <0.05 NG/ML
UROBILINOGEN UR QL STRIP.AUTO: 0.2 EU/DL (ref 0.2–1)
VENTRICULAR RATE, ECG03: 96 BPM
WBC # BLD AUTO: 9.1 K/UL (ref 3.6–11)
WBC URNS QL MICRO: ABNORMAL /HPF (ref 0–4)

## 2018-07-07 PROCEDURE — 77030038269 HC DRN EXT URIN PURWCK BARD -A

## 2018-07-07 PROCEDURE — 74011000258 HC RX REV CODE- 258: Performed by: EMERGENCY MEDICINE

## 2018-07-07 PROCEDURE — 96361 HYDRATE IV INFUSION ADD-ON: CPT

## 2018-07-07 PROCEDURE — 36415 COLL VENOUS BLD VENIPUNCTURE: CPT | Performed by: INTERNAL MEDICINE

## 2018-07-07 PROCEDURE — 81001 URINALYSIS AUTO W/SCOPE: CPT | Performed by: EMERGENCY MEDICINE

## 2018-07-07 PROCEDURE — 74011000258 HC RX REV CODE- 258: Performed by: INTERNAL MEDICINE

## 2018-07-07 PROCEDURE — 74011250637 HC RX REV CODE- 250/637: Performed by: INTERNAL MEDICINE

## 2018-07-07 PROCEDURE — 96375 TX/PRO/DX INJ NEW DRUG ADDON: CPT

## 2018-07-07 PROCEDURE — 87040 BLOOD CULTURE FOR BACTERIA: CPT | Performed by: EMERGENCY MEDICINE

## 2018-07-07 PROCEDURE — 74011636320 HC RX REV CODE- 636/320: Performed by: EMERGENCY MEDICINE

## 2018-07-07 PROCEDURE — 65660000000 HC RM CCU STEPDOWN

## 2018-07-07 PROCEDURE — 74011250636 HC RX REV CODE- 250/636: Performed by: EMERGENCY MEDICINE

## 2018-07-07 PROCEDURE — 85025 COMPLETE CBC W/AUTO DIFF WBC: CPT | Performed by: EMERGENCY MEDICINE

## 2018-07-07 PROCEDURE — 84484 ASSAY OF TROPONIN QUANT: CPT | Performed by: EMERGENCY MEDICINE

## 2018-07-07 PROCEDURE — 71275 CT ANGIOGRAPHY CHEST: CPT

## 2018-07-07 PROCEDURE — 65390000012 HC CONDITION CODE 44 OBSERVATION

## 2018-07-07 PROCEDURE — 74011250637 HC RX REV CODE- 250/637: Performed by: EMERGENCY MEDICINE

## 2018-07-07 PROCEDURE — 83605 ASSAY OF LACTIC ACID: CPT | Performed by: INTERNAL MEDICINE

## 2018-07-07 PROCEDURE — 87077 CULTURE AEROBIC IDENTIFY: CPT | Performed by: EMERGENCY MEDICINE

## 2018-07-07 PROCEDURE — 83605 ASSAY OF LACTIC ACID: CPT

## 2018-07-07 PROCEDURE — 85379 FIBRIN DEGRADATION QUANT: CPT | Performed by: EMERGENCY MEDICINE

## 2018-07-07 PROCEDURE — 74011000250 HC RX REV CODE- 250: Performed by: EMERGENCY MEDICINE

## 2018-07-07 PROCEDURE — 87186 SC STD MICRODIL/AGAR DIL: CPT | Performed by: EMERGENCY MEDICINE

## 2018-07-07 PROCEDURE — 96365 THER/PROPH/DIAG IV INF INIT: CPT

## 2018-07-07 PROCEDURE — 94761 N-INVAS EAR/PLS OXIMETRY MLT: CPT

## 2018-07-07 PROCEDURE — 74011250636 HC RX REV CODE- 250/636: Performed by: INTERNAL MEDICINE

## 2018-07-07 PROCEDURE — 96372 THER/PROPH/DIAG INJ SC/IM: CPT

## 2018-07-07 PROCEDURE — 87086 URINE CULTURE/COLONY COUNT: CPT | Performed by: EMERGENCY MEDICINE

## 2018-07-07 PROCEDURE — 80053 COMPREHEN METABOLIC PANEL: CPT | Performed by: EMERGENCY MEDICINE

## 2018-07-07 PROCEDURE — 93005 ELECTROCARDIOGRAM TRACING: CPT

## 2018-07-07 PROCEDURE — 99285 EMERGENCY DEPT VISIT HI MDM: CPT

## 2018-07-07 RX ORDER — METOPROLOL TARTRATE 25 MG/1
25 TABLET, FILM COATED ORAL 2 TIMES DAILY
Status: DISCONTINUED | OUTPATIENT
Start: 2018-07-07 | End: 2018-07-11 | Stop reason: HOSPADM

## 2018-07-07 RX ORDER — HEPARIN SODIUM 5000 [USP'U]/ML
5000 INJECTION, SOLUTION INTRAVENOUS; SUBCUTANEOUS EVERY 8 HOURS
Status: DISCONTINUED | OUTPATIENT
Start: 2018-07-07 | End: 2018-07-09

## 2018-07-07 RX ORDER — SODIUM CHLORIDE 0.9 % (FLUSH) 0.9 %
5-10 SYRINGE (ML) INJECTION AS NEEDED
Status: DISCONTINUED | OUTPATIENT
Start: 2018-07-07 | End: 2018-07-07 | Stop reason: SDUPTHER

## 2018-07-07 RX ORDER — METHOTREXATE 2.5 MG/1
7.5 TABLET ORAL
Status: DISCONTINUED | OUTPATIENT
Start: 2018-07-08 | End: 2018-07-07

## 2018-07-07 RX ORDER — NEOMYCIN SULFATE, POLYMYXIN B SULFATE, AND DEXAMETHASONE 3.5; 10000; 1 MG/G; [USP'U]/G; MG/G
OINTMENT OPHTHALMIC ONCE
Status: DISCONTINUED | OUTPATIENT
Start: 2018-07-07 | End: 2018-07-07 | Stop reason: SDUPTHER

## 2018-07-07 RX ORDER — ONDANSETRON 2 MG/ML
4 INJECTION INTRAMUSCULAR; INTRAVENOUS
Status: COMPLETED | OUTPATIENT
Start: 2018-07-07 | End: 2018-07-07

## 2018-07-07 RX ORDER — CLONAZEPAM 0.5 MG/1
0.5 TABLET, ORALLY DISINTEGRATING ORAL 3 TIMES DAILY
Status: DISCONTINUED | OUTPATIENT
Start: 2018-07-07 | End: 2018-07-11 | Stop reason: HOSPADM

## 2018-07-07 RX ORDER — SODIUM CHLORIDE 0.9 % (FLUSH) 0.9 %
10 SYRINGE (ML) INJECTION
Status: COMPLETED | OUTPATIENT
Start: 2018-07-07 | End: 2018-07-07

## 2018-07-07 RX ORDER — SODIUM CHLORIDE 0.9 % (FLUSH) 0.9 %
5-10 SYRINGE (ML) INJECTION EVERY 8 HOURS
Status: DISCONTINUED | OUTPATIENT
Start: 2018-07-07 | End: 2018-07-11 | Stop reason: HOSPADM

## 2018-07-07 RX ORDER — NEOMYCIN SULFATE, POLYMYXIN B SULFATE, AND DEXAMETHASONE 3.5; 10000; 1 MG/G; [USP'U]/G; MG/G
OINTMENT OPHTHALMIC 4 TIMES DAILY
Status: DISCONTINUED | OUTPATIENT
Start: 2018-07-07 | End: 2018-07-11 | Stop reason: HOSPADM

## 2018-07-07 RX ORDER — CLONAZEPAM 1 MG/1
1 TABLET ORAL 2 TIMES DAILY
COMMUNITY

## 2018-07-07 RX ORDER — DOCUSATE SODIUM 100 MG/1
100 CAPSULE, LIQUID FILLED ORAL 2 TIMES DAILY
Status: DISCONTINUED | OUTPATIENT
Start: 2018-07-07 | End: 2018-07-11 | Stop reason: HOSPADM

## 2018-07-07 RX ORDER — NALOXONE HYDROCHLORIDE 0.4 MG/ML
0.4 INJECTION, SOLUTION INTRAMUSCULAR; INTRAVENOUS; SUBCUTANEOUS AS NEEDED
COMMUNITY

## 2018-07-07 RX ORDER — DILTIAZEM HYDROCHLORIDE 5 MG/ML
5 INJECTION INTRAVENOUS
Status: COMPLETED | OUTPATIENT
Start: 2018-07-07 | End: 2018-07-07

## 2018-07-07 RX ORDER — ATORVASTATIN CALCIUM 20 MG/1
20 TABLET, FILM COATED ORAL
Status: DISCONTINUED | OUTPATIENT
Start: 2018-07-07 | End: 2018-07-11 | Stop reason: HOSPADM

## 2018-07-07 RX ORDER — QUETIAPINE FUMARATE 100 MG/1
50 TABLET, FILM COATED ORAL 2 TIMES DAILY
COMMUNITY
End: 2019-07-16

## 2018-07-07 RX ORDER — NEOMYCIN SULFATE, POLYMYXIN B SULFATE AND DEXAMETHASONE 3.5; 10000; 1 MG/ML; [USP'U]/ML; MG/ML
1 SUSPENSION/ DROPS OPHTHALMIC
Status: DISCONTINUED | OUTPATIENT
Start: 2018-07-07 | End: 2018-07-07 | Stop reason: SDUPTHER

## 2018-07-07 RX ORDER — HYDROCODONE BITARTRATE AND ACETAMINOPHEN 5; 325 MG/1; MG/1
1 TABLET ORAL
Status: DISCONTINUED | OUTPATIENT
Start: 2018-07-07 | End: 2018-07-11 | Stop reason: HOSPADM

## 2018-07-07 RX ORDER — THERA TABS 400 MCG
1 TAB ORAL DAILY
Status: DISCONTINUED | OUTPATIENT
Start: 2018-07-08 | End: 2018-07-11 | Stop reason: HOSPADM

## 2018-07-07 RX ORDER — LEVOTHYROXINE SODIUM 75 UG/1
37.5 TABLET ORAL
COMMUNITY

## 2018-07-07 RX ORDER — ACETAMINOPHEN 500 MG
500 TABLET ORAL
Status: DISCONTINUED | OUTPATIENT
Start: 2018-07-07 | End: 2018-07-11 | Stop reason: HOSPADM

## 2018-07-07 RX ORDER — TRAZODONE HYDROCHLORIDE 100 MG/1
200 TABLET ORAL
COMMUNITY
End: 2019-07-16

## 2018-07-07 RX ORDER — VENLAFAXINE HYDROCHLORIDE 150 MG/1
150 CAPSULE, EXTENDED RELEASE ORAL DAILY
Status: DISCONTINUED | OUTPATIENT
Start: 2018-07-08 | End: 2018-07-11 | Stop reason: HOSPADM

## 2018-07-07 RX ORDER — PANTOPRAZOLE SODIUM 40 MG/1
40 TABLET, DELAYED RELEASE ORAL
Status: DISCONTINUED | OUTPATIENT
Start: 2018-07-08 | End: 2018-07-10

## 2018-07-07 RX ORDER — SODIUM CHLORIDE 0.9 % (FLUSH) 0.9 %
5-10 SYRINGE (ML) INJECTION AS NEEDED
Status: DISCONTINUED | OUTPATIENT
Start: 2018-07-07 | End: 2018-07-11 | Stop reason: HOSPADM

## 2018-07-07 RX ORDER — BISACODYL 5 MG
5 TABLET, DELAYED RELEASE (ENTERIC COATED) ORAL DAILY PRN
Status: DISCONTINUED | OUTPATIENT
Start: 2018-07-07 | End: 2018-07-11 | Stop reason: HOSPADM

## 2018-07-07 RX ORDER — SODIUM CHLORIDE 9 MG/ML
50 INJECTION, SOLUTION INTRAVENOUS
Status: COMPLETED | OUTPATIENT
Start: 2018-07-07 | End: 2018-07-07

## 2018-07-07 RX ORDER — DEXTROSE MONOHYDRATE AND SODIUM CHLORIDE 5; .9 G/100ML; G/100ML
75 INJECTION, SOLUTION INTRAVENOUS CONTINUOUS
Status: DISPENSED | OUTPATIENT
Start: 2018-07-07 | End: 2018-07-10

## 2018-07-07 RX ORDER — LEVOTHYROXINE SODIUM 25 UG/1
25 TABLET ORAL
Status: DISCONTINUED | OUTPATIENT
Start: 2018-07-08 | End: 2018-07-11 | Stop reason: HOSPADM

## 2018-07-07 RX ORDER — NALOXONE HYDROCHLORIDE 0.4 MG/ML
0.4 INJECTION, SOLUTION INTRAMUSCULAR; INTRAVENOUS; SUBCUTANEOUS AS NEEDED
Status: DISCONTINUED | OUTPATIENT
Start: 2018-07-07 | End: 2018-07-11 | Stop reason: HOSPADM

## 2018-07-07 RX ORDER — OXYCODONE AND ACETAMINOPHEN 5; 325 MG/1; MG/1
2 TABLET ORAL
Status: COMPLETED | OUTPATIENT
Start: 2018-07-07 | End: 2018-07-07

## 2018-07-07 RX ORDER — CLONAZEPAM 0.5 MG/1
1 TABLET, ORALLY DISINTEGRATING ORAL
Status: COMPLETED | OUTPATIENT
Start: 2018-07-07 | End: 2018-07-07

## 2018-07-07 RX ORDER — FOLIC ACID 1 MG/1
1 TABLET ORAL DAILY
Status: DISCONTINUED | OUTPATIENT
Start: 2018-07-08 | End: 2018-07-11 | Stop reason: HOSPADM

## 2018-07-07 RX ORDER — DOCUSATE SODIUM 100 MG/1
100 CAPSULE, LIQUID FILLED ORAL 2 TIMES DAILY
COMMUNITY

## 2018-07-07 RX ORDER — ONDANSETRON 2 MG/ML
2 INJECTION INTRAMUSCULAR; INTRAVENOUS
Status: DISCONTINUED | OUTPATIENT
Start: 2018-07-07 | End: 2018-07-11 | Stop reason: HOSPADM

## 2018-07-07 RX ADMIN — OXYCODONE HYDROCHLORIDE AND ACETAMINOPHEN 2 TABLET: 5; 325 TABLET ORAL at 07:36

## 2018-07-07 RX ADMIN — DILTIAZEM HYDROCHLORIDE 5 MG: 5 INJECTION INTRAVENOUS at 09:35

## 2018-07-07 RX ADMIN — HEPARIN SODIUM 5000 UNITS: 5000 INJECTION, SOLUTION INTRAVENOUS; SUBCUTANEOUS at 13:56

## 2018-07-07 RX ADMIN — DEXTROSE MONOHYDRATE AND SODIUM CHLORIDE 75 ML/HR: 5; .9 INJECTION, SOLUTION INTRAVENOUS at 12:52

## 2018-07-07 RX ADMIN — ATORVASTATIN CALCIUM 20 MG: 20 TABLET, FILM COATED ORAL at 22:05

## 2018-07-07 RX ADMIN — Medication 10 ML: at 16:16

## 2018-07-07 RX ADMIN — Medication 10 ML: at 10:54

## 2018-07-07 RX ADMIN — SODIUM CHLORIDE 50 ML/HR: 900 INJECTION, SOLUTION INTRAVENOUS at 10:54

## 2018-07-07 RX ADMIN — METOPROLOL TARTRATE 25 MG: 25 TABLET ORAL at 17:51

## 2018-07-07 RX ADMIN — IOPAMIDOL 100 ML: 755 INJECTION, SOLUTION INTRAVENOUS at 10:54

## 2018-07-07 RX ADMIN — NEOMYCIN SULFATE, POLYMYXIN B SULFATE, AND DEXAMETHASONE: 3.5; 10000; 1 OINTMENT OPHTHALMIC at 22:07

## 2018-07-07 RX ADMIN — TRAZODONE HYDROCHLORIDE 150 MG: 100 TABLET ORAL at 22:06

## 2018-07-07 RX ADMIN — CEFTRIAXONE 1 G: 1 INJECTION, POWDER, FOR SOLUTION INTRAMUSCULAR; INTRAVENOUS at 10:21

## 2018-07-07 RX ADMIN — CLONAZEPAM 1 MG: 0.5 TABLET, ORALLY DISINTEGRATING ORAL at 12:00

## 2018-07-07 RX ADMIN — DOCUSATE SODIUM 100 MG: 100 CAPSULE, LIQUID FILLED ORAL at 17:51

## 2018-07-07 RX ADMIN — Medication 10 ML: at 22:07

## 2018-07-07 RX ADMIN — SODIUM CHLORIDE 232 ML: 900 INJECTION, SOLUTION INTRAVENOUS at 14:05

## 2018-07-07 RX ADMIN — CLONAZEPAM 0.5 MG: 0.5 TABLET, ORALLY DISINTEGRATING ORAL at 17:50

## 2018-07-07 RX ADMIN — HYDROCODONE BITARTRATE AND ACETAMINOPHEN 1 TABLET: 5; 325 TABLET ORAL at 19:55

## 2018-07-07 RX ADMIN — ONDANSETRON 4 MG: 2 INJECTION, SOLUTION INTRAMUSCULAR; INTRAVENOUS at 08:22

## 2018-07-07 RX ADMIN — SODIUM CHLORIDE 500 ML: 900 INJECTION, SOLUTION INTRAVENOUS at 07:36

## 2018-07-07 RX ADMIN — NEOMYCIN SULFATE, POLYMYXIN B SULFATE, AND DEXAMETHASONE: 3.5; 10000; 1 OINTMENT OPHTHALMIC at 12:21

## 2018-07-07 RX ADMIN — HYDROCODONE BITARTRATE AND ACETAMINOPHEN 1 TABLET: 5; 325 TABLET ORAL at 13:57

## 2018-07-07 RX ADMIN — CLONAZEPAM 0.5 MG: 0.5 TABLET, ORALLY DISINTEGRATING ORAL at 22:06

## 2018-07-07 RX ADMIN — SODIUM CHLORIDE 1000 ML: 900 INJECTION, SOLUTION INTRAVENOUS at 12:12

## 2018-07-07 RX ADMIN — SODIUM CHLORIDE 1000 ML: 900 INJECTION, SOLUTION INTRAVENOUS at 12:00

## 2018-07-07 RX ADMIN — HEPARIN SODIUM 5000 UNITS: 5000 INJECTION, SOLUTION INTRAVENOUS; SUBCUTANEOUS at 22:07

## 2018-07-07 RX ADMIN — METOPROLOL TARTRATE 25 MG: 25 TABLET ORAL at 13:57

## 2018-07-07 RX ADMIN — DOCUSATE SODIUM 100 MG: 100 CAPSULE, LIQUID FILLED ORAL at 13:57

## 2018-07-07 NOTE — ED NOTES
Medicated as ordered. Eye drops requested as ointment. IVF bolus started for CODE purple. Pt turned s/s, placed in clean brief for comfort. Juan David Lopez remains in place.   
 
Dr Amando Zarate back to bedsiide at patients request

## 2018-07-07 NOTE — ED NOTES
Bedside shift change report given to Trev Irene RN (oncoming nurse) by LOBO Morales (offgoing nurse). Report included the following information SBAR, Kardex, OR Summary, Intake/Output, MAR and Recent Results.

## 2018-07-07 NOTE — ED NOTES
Pt has had po second percocet swallowing with water and the iv zofran She does not cough or choke but she again belches a lot.

## 2018-07-07 NOTE — IP AVS SNAPSHOT
[address] [facility] 
[phone] Patient: Estelle Arenas MRN: [id number] :[id number] About your hospitalization You were admitted on:  2018 You last received care in the:  [facility] 3 NEUROSCIENCE TELEMETRY You were discharged on:  2018 Why you were hospitalized Your primary diagnosis was:  Sepsis (Hcc) Your diagnoses also included:  Hypertension, Ra (Rheumatoid Arthritis) (Hcc), Anxiety, Depression Follow-up Information Follow up With Details Comments Contact Info [address] 
[phone] Jonna Powell MD   [address] [facility] [address] [facility] 
[phone] JOHN Blandon Go on 2018 Please follow up on 2018 at 10:00 305 [facility] 202 [facility] 
[phone] Discharge Orders None A check familia indicates which time of day the medication should be taken. My Medications START taking these medications Instructions Each Dose to Equal  
 Morning Noon Evening Bedtime  
 levoFLOXacin 500 mg tablet Commonly known as:  [place] Your last dose was: Your next dose is: Take 1 Tab by mouth daily. 500 mg CONTINUE taking these medications Instructions Each Dose to Equal  
 Morning Noon Evening Bedtime  
 clonazePAM 1 mg tablet Commonly known as:  Heriberto Lam Your last dose was: Your next dose is: Take 1 mg by mouth two (2) times a day. 1 mg  
    
   
   
   
  
 docusate sodium 100 mg capsule Commonly known as:  Sharath Connolly Your last dose was: Your next dose is: Take 100 mg by mouth two (2) times a day. 100 mg  
    
   
   
   
  
 ferrous sulfate 325 mg (65 mg iron) tablet Commonly known as:  Iron (Ferrous Sulfate) Your last dose was: Your next dose is: Take 1 Tab by mouth two (2) times a day. 325 mg  
    
   
   
   
  
 folic acid 1 mg tablet Commonly known as:  [employer] Your last dose was: Your next dose is: Take  by mouth daily. HUMIRA PEN 40 mg/0.8 mL injection pen Generic drug:  adalimumab Your last dose was: Your next dose is: INJECT 40MG(1 PEN) UNDER SKIN EVERY WEEK. HYDROcodone-acetaminophen 5-325 mg per tablet Commonly known as:  Caire South Your last dose was: Your next dose is: Take  by mouth every six (6) hours as needed for Pain.  
     
   
   
   
  
 levothyroxine 75 mcg tablet Commonly known as:  SYNTHROID Your last dose was: Your next dose is: Take 37.5 mcg by mouth Daily (before breakfast). 37.5 mcg  
    
   
   
   
  
 lovastatin 40 mg tablet Commonly known as:  MEVACOR Your last dose was: Your next dose is: Take 40 mg by mouth daily. 40 mg  
    
   
   
   
  
 metoprolol tartrate 25 mg tablet Commonly known as:  LOPRESSOR Your last dose was: Your next dose is: Take 25 mg by mouth two (2) times a day. 25 mg  
    
   
   
   
  
 mirtazapine 45 mg tablet Commonly known as:  Aaron Dangelo Your last dose was: Your next dose is: Take 45 mg by mouth nightly. 45 mg  
    
   
   
   
  
 multivitamin tablet Commonly known as:  ONE A DAY Your last dose was: Your next dose is: Take 1 Tab by mouth daily. 1 Tab  
    
   
   
   
  
 naloxone 0.4 mg/mL injection Commonly known as:  [employer] Your last dose was: Your next dose is: 0.4 mg by IntraVENous route as needed. 0.4 mg  
    
   
   
   
  
 pantoprazole 40 mg tablet Commonly known as:  PROTONIX Your last dose was: Your next dose is: Take 1 Tab by mouth Daily (before breakfast). 40 mg  
    
   
   
   
  
 polyvinyl alcohol 1.4 % ophthalmic solution Commonly known as:  Zohaib Jung Your last dose was: Your next dose is:    
   
   
 Administer 1 Drop to both eyes as needed. 1 Drop  
    
   
   
   
  
 prednisoLONE acetate 1 % ophthalmic suspension Commonly known as:  PRED FORTE Your last dose was: Your next dose is:    
   
   
 Administer 1 Drop to left eye three (3) times daily. 1 Drop QUEtiapine 100 mg tablet Commonly known as:  SEROquel Your last dose was: Your next dose is: Take 50 mg by mouth two (2) times a day. 50 mg  
    
   
   
   
  
 traZODone 100 mg tablet Commonly known as:  Carlos A Chung Your last dose was: Your next dose is: Take 200 mg by mouth nightly. 200 mg  
    
   
   
   
  
 TYLENOL 325 mg tablet Generic drug:  acetaminophen Your last dose was: Your next dose is: Take  by mouth every four (4) hours as needed for Pain. venlafaxine- mg capsule Commonly known as:  EFFEXOR-XR Your last dose was: Your next dose is: Take  by mouth daily. Where to Get Your Medications These medications were sent to [address] [place] [address]  909 [facility], [address] [place] [address] [address] [address] Phone:  [phone]  
  levoFLOXacin 500 mg tablet Opioid Education Prescription Opioids: What You Need to Know: 
 
Prescription opioids can be used to help relieve moderate-to-severe pain and are often prescribed following a surgery or injury, or for certain health conditions.   These medications can be an important part of treatment but also come with serious risks. Opioids are strong pain medicines. Examples include hydrocodone, oxycodone, fentanyl, and morphine. Heroin is an example of an illegal opioid. It is important to work with your health care provider to make sure you are getting the safest, most effective care. WHAT ARE THE RISKS AND SIDE EFFECTS OF OPIOID USE? Prescription opioids carry serious risks of addiction and overdose, especially with prolonged use. An opioid overdose, often marked by slow breathing, can cause sudden death. The use of prescription opioids can have a number of side effects as well, even when taken as directed. · Tolerance-meaning you might need to take more of a medication for the same pain relief · Physical dependence-meaning you have symptoms of withdrawal when the medication is stopped. Withdrawal symptoms can include nausea, sweating, chills, diarrhea, stomach cramps, and muscle aches. Withdrawal can last up to several weeks, depending on which drug you took and how long you took it. · Increased sensitivity to pain · Constipation · Nausea, vomiting, and dry mouth · Sleepiness and dizziness · Confusion · Depression · Low levels of testosterone that can result in lower sex drive, energy, and strength · Itching and sweating RISKS ARE GREATER WITH:      
· History of drug misuse, substance use disorder, or overdose · Mental health conditions (such as depression or anxiety) · Sleep apnea · Older age (72 years or older) · Pregnancy Avoid alcohol while taking prescription opioids. Also, unless specifically advised by your health care provider, medications to avoid include: · Benzodiazepines (such as Xanax or Valium) · Muscle relaxants (such as Soma or Flexeril) · Hypnotics (such as Ambien or Lunesta) · Other prescription opioids KNOW YOUR OPTIONS Talk to your health care provider about ways to manage your pain that don't involve prescription opioids. Some of these options may actually work better and have fewer risks and side effects. Options may include: 
· Pain relievers such as acetaminophen, ibuprofen, and naproxen · Some medications that are also used for depression or seizures · Physical therapy and exercise · Counseling to help patients learn how to cope better with triggers of pain and stress. · Application of heat or cold compress · Massage therapy · Relaxation techniques Be Informed Make sure you know the name of your medication, how much and how often to take it, and its potential risks & side effects. IF YOU ARE PRESCRIBED OPIOIDS FOR PAIN: 
· Never take opioids in greater amounts or more often than prescribed. Remember the goal is not to be pain-free but to manage your pain at a tolerable level. · Follow up with your primary care provider to: · Work together to create a plan on how to manage your pain. · Talk about ways to help manage your pain that don't involve prescription opioids. · Talk about any and all concerns and side effects. · Help prevent misuse and abuse. · Never sell or share prescription opioids · Help prevent misuse and abuse. · Store prescription opioids in a secure place and out of reach of others (this may include visitors, children, friends, and family). · Safely dispose of unused/unwanted prescription opioids: Find your community drug take-back program or your pharmacy mail-back program, or flush them down the toilet, following guidance from the Food and Drug Administration (www.fda.gov/Drugs/ResourcesForYou). · Visit www.cdc.gov/drugoverdose to learn about the risks of opioid abuse and overdose. · If you believe you may be struggling with addiction, tell your health care provider and ask for guidance or call [address] [place], [place] [address] at 2-045-957-WAZL. Discharge Instructions Anxiety Disorder: Care Instructions Your Care Instructions Anxiety is a normal reaction to stress. Difficult situations can cause you to have symptoms such as sweaty palms and a nervous feeling. In an anxiety disorder, the symptoms are far more severe. Constant worry, muscle tension, trouble sleeping, nausea and diarrhea, and other symptoms can make normal daily activities difficult or impossible. These symptoms may occur for no reason, and they can affect your work, school, or social life. Medicines, counseling, and self-care can all help. Follow-up care is a key part of your treatment and safety. Be sure to make and go to all appointments, and call your doctor if you are having problems. It's also a good idea to know your test results and keep a list of the medicines you take. How can you care for yourself at home? · Take medicines exactly as directed. Call your doctor if you think you are having a problem with your medicine. · Go to your counseling sessions and follow-up appointments. · Recognize and accept your anxiety. Then, when you are in a situation that makes you anxious, say to yourself, \"This is not an emergency. I feel uncomfortable, but I am not in danger. I can keep going even if I feel anxious. \" · Be kind to your body: ¨ Relieve tension with exercise or a massage. ¨ Get enough rest. 
¨ Avoid alcohol, caffeine, nicotine, and illegal drugs. They can increase your anxiety level and cause sleep problems. ¨ Learn and do relaxation techniques. See below for more about these techniques. · Engage your mind. Get out and do something you enjoy. Go to a funny movie, or take a walk or hike. Plan your day. Having too much or too little to do can make you anxious. · Keep a record of your symptoms. Discuss your fears with a good friend or family member, or join a support group for people with similar problems. Talking to others sometimes relieves stress. · Get involved in social groups, or volunteer to help others. Being alone sometimes makes things seem worse than they are. · Get at least 30 minutes of exercise on most days of the week to relieve stress. Walking is a good choice. You also may want to do other activities, such as running, swimming, cycling, or playing tennis or team sports. Relaxation techniques Do relaxation exercises 10 to 20 minutes a day. You can play soothing, relaxing music while you do them, if you wish. · Tell others in your house that you are going to do your relaxation exercises. Ask them not to disturb you. · Find a comfortable place, away from all distractions and noise. · Lie down on your back, or sit with your back straight. · Focus on your breathing. Make it slow and steady. · Breathe in through your nose. Breathe out through either your nose or mouth. · Breathe deeply, filling up the area between your navel and your rib cage. Breathe so that your belly goes up and down. · Do not hold your breath. · Breathe like this for 5 to 10 minutes. Notice the feeling of calmness throughout your whole body. As you continue to breathe slowly and deeply, relax by doing the following for another 5 to 10 minutes: · Tighten and relax each muscle group in your body. You can begin at your toes and work your way up to your head. · Imagine your muscle groups relaxing and becoming heavy. · Empty your mind of all thoughts. · Let yourself relax more and more deeply. · Become aware of the state of calmness that surrounds you. · When your relaxation time is over, you can bring yourself back to alertness by moving your fingers and toes and then your hands and feet and then stretching and moving your entire body. Sometimes people fall asleep during relaxation, but they usually wake up shortly afterward.  
· Always give yourself time to return to full alertness before you drive a car or do anything that might cause an accident if you are not fully alert. Never play a relaxation tape while you drive a car. When should you call for help? Call 911 anytime you think you may need emergency care. For example, call if: 
  · You feel you cannot stop from hurting yourself or someone else.  
William Givens the numbers for these national suicide hotlines: 0-464-568-TALK (6-134.976.3931) and 1-268-BTVZQEA (4-232.847.7619). If you or someone you know talks about suicide or feeling hopeless, get help right away. 
 Watch closely for changes in your health, and be sure to contact your doctor if: 
  · You have anxiety or fear that affects your life.  
  · You have symptoms of anxiety that are new or different from those you had before. Where can you learn more? Go to http://theo-sav.info/. Enter P754 in the search box to learn more about \"Anxiety Disorder: Care Instructions. \" Current as of: December 7, 2017 Content Version: 11.7 © 0065-7685 [employer]. Care instructions adapted under license by [employer] (which disclaims liability or warranty for this information). If you have questions about a medical condition or this instruction, always ask your healthcare professional. [address] any warranty or liability for your use of this information. ACO Transitions of Care Introducing [employer] [address] Diane Good offers a voluntary care coordination program to provide high quality service and care to [facility] fee-for-service beneficiaries. Baldo Dacosta was designed to help you enhance your health and well-being through the following services: ? Transitions of Care  support for individuals who are transitioning from one care setting to another (example: Hospital to home). ?  Chronic and Complex Care Coordination  support for individuals and caregivers of those with serious or chronic illnesses or with more than one chronic (ongoing) condition and those who take a number of different medications. If you meet specific medical criteria, a [facility] [address] may call you directly to coordinate your care with your primary care physician and your other care providers. For questions about the [facility] programs, please, contact your physicians office. For general questions or additional information about Accountable Care Organizations: 
Please visit www.medicare.gov/acos. html or call 1-800-MEDICARE (3-825.334.9437) TTY users should call 4-908.226.1985. [employer] Announcement We are excited to announce that we are making your provider's discharge notes available to you in [employer]. You will see these notes when they are completed and signed by the physician that discharged you from your recent hospital stay. If you have any questions or concerns about any information you see in [employer], please call the Health Information Department where you were seen or reach out to your Primary Care Provider for more information about your plan of care. Introducing [facility]! Denise Meza introduces [employer] patient portal. Now you can access parts of your medical record, email your doctor's office, and request medication refills online. 1. In your internet browser, go to [email or web address] 2. Click on the First Time User? Click Here link in the Sign In box. You will see the New Member Sign Up page. 3. Enter your [employer] Access Code exactly as it appears below. You will not need to use this code after youve completed the sign-up process. If you do not sign up before the expiration date, you must request a new code. · [employer] Access Code: [email or web address] Expires: 9/30/2018  1:08 PM 
 
4.  Enter the last four digits of your Social Security Number (xxxx) and Date of Birth (mm/dd/yyyy) as indicated and click Submit. You will be taken to the next sign-up page. 5. Create a Allegorithmict ID. This will be your [employer] login ID and cannot be changed, so think of one that is secure and easy to remember. 6. Create a Allegor[employer]ict password. You can change your password at any time. 7. Enter your Password Reset Question and Answer. This can be used at a later time if you forget your password. 8. Enter your e-mail address. You will receive e-mail notification when new information is available in [address] 9. Click Sign Up. You can now view and download portions of your medical record. 10. Click the Download Summary menu link to download a portable copy of your medical information. If you have questions, please visit the Frequently Asked Questions section of the [employer] website. Remember, [employer] is NOT to be used for urgent needs. For medical emergencies, dial 911. Now available from your iPhone and Android! Introducing Shyam Andino As a [facility] patient, I wanted to make you aware of our electronic visit tool called Shyam Demarcohubertfin. [facility] 24/7 allows you to connect within minutes with a medical provider 24 hours a day, seven days a week via a mobile device or tablet or logging into a secure website from your computer. You can access Shyam Andino from anywhere in the [place]. A virtual visit might be right for you when you have a simple condition and feel like you just dont want to get out of bed, or cant get away from work for an appointment, when your regular [facility] provider is not available (evenings, weekends or holidays), or when youre out of town and need minor care. Electronic visits cost only $49 and if the [facility] 24/7 provider determines a prescription is needed to treat your condition, one can be electronically transmitted to a nearby pharmacy*. Please take a moment to enroll today if you have not already done so. The enrollment process is free and takes just a few minutes. To enroll, please download the Brenda Card 24/7 conrad to your tablet or phone, or visit [email or web address] to enroll on your computer. And, as an [address] [place] [address] patient with a "[employer]" account, the results of your visits will be scanned into your electronic medical record and your primary care provider will be able to view the scanned results. We urge you to continue to see your regular [facility] provider for your ongoing medical care. And while your primary care provider may not be the one available when you seek a [employer] virtual visit, the peace of mind you get from getting a real diagnosis real time can be priceless. For more information on [employer], view our Frequently Asked Questions (FAQs) at [email or web address]. Sincerely, 
 
Helen Holder MD 
Chief Medical Officer [employer] *:  certain medications cannot be prescribed via Nefsis Unresulted Labs-Please follow up with your PCP about these lab tests Order Current Status CULTURE, BLOOD Preliminary result CULTURE, BLOOD Preliminary result Providers Seen During Your Hospitalization Provider Specialty Primary office phone Sixto Chawla MD Emergency Medicine [phone] Charleen Duckworth MD Hospitalist [phone] Randi Castorena MD Internal Medicine [phone] Your Primary Care Physician (PCP) Primary Care Physician Office Phone Office Fax Gwynda Hashimoto [phone] You are allergic to the following Allergen Reactions Codeine Nausea Only Recent Documentation Height Weight Breastfeeding? BMI OB Status Smoking Status 1.575 m 73.8 kg No 29.76 kg/m2 Postmenopausal Former Smoker Emergency Contacts Name Discharge Info Relation Home Work Mobile Reva Garcia DISCHARGE CAREGIVER [3] Daughter [21] [phone] [address] [5] Brother [24] [phone] Nadira Roman UNABLE TO CHOOSE [5] Other Relative [6] [phone] Patient Belongings The following personal items are in your possession at time of discharge: 
  Dental Appliances: None  Visual Aid: Glasses, With patient      Home Medications: None   Jewelry: None  Clothing: None    Other Valuables: None Please provide this summary of care documentation to your next provider. Signatures-by signing, you are acknowledging that this After Visit Summary has been reviewed with you and you have received a copy. Patient Signature:  ____________________________________________________________ Date:  ____________________________________________________________  
  
Mandeep Artist Provider Signature:  ____________________________________________________________ Date:  ____________________________________________________________

## 2018-07-07 NOTE — PROGRESS NOTES
Problem: Pressure Injury - Risk of 
Goal: *Prevention of pressure injury Document Sage Scale and appropriate interventions in the flowsheet. Outcome: Progressing Towards Goal 
Pressure Injury Interventions: 
  
 
Moisture Interventions: Absorbent underpads, Internal/External urinary devices, Limit adult briefs, Minimize layers Activity Interventions: Pressure redistribution bed/mattress(bed type), PT/OT evaluation Mobility Interventions: PT/OT evaluation, Float heels, Pressure redistribution bed/mattress (bed type) Nutrition Interventions: Document food/fluid/supplement intake, Offer support with meals,snacks and hydration Problem: Falls - Risk of 
Goal: *Absence of Falls Document Edgar Brunner Fall Risk and appropriate interventions in the flowsheet. Outcome: Progressing Towards Goal 
Fall Risk Interventions: 
Mobility Interventions: Bed/chair exit alarm, Patient to call before getting OOB, PT Consult for mobility concerns, OT consult for ADLs Medication Interventions: Patient to call before getting OOB, Teach patient to arise slowly, Bed/chair exit alarm Elimination Interventions: Call light in reach, Patient to call for help with toileting needs, Toileting schedule/hourly rounds History of Falls Interventions: Investigate reason for fall, Room close to nurse's station, Door open when patient unattended

## 2018-07-07 NOTE — H&P
History and Physical   
 
   
Pt Name  Elaine Mary Date of Birth 1940 Medical Record Number  [id number] Age  78 y.o. PCP Cheko Hennessy MD  
Admit date:  7/7/2018 Room Number  2274/01  @ [facility] Date of Service  7/7/2018 Admission Diagnoses:  Sepsis ([facility] [address] Certification: We are admitting Elaine Mary 77 y.o. female with a principle diagnosis of Sepsis ([facility] [address] This patient also suffers from other comorbidities listed below. I have a high level of concern for progression of septic process  leading to life threatening complications Assessment and plan: 
Sepsis possibly due to UTI  Hypertension  RA (rheumatoid arthritis) ([facility] [address]  Anxiety  Depression PLAN :  
· Admit to stepdown · IVF and other sepsis orders were initiated by ER doctor. · We will continue broad spectrum abx with GM negative coverage. · Since this pt is immunocompromised from her chronic immunosuppressants we will remain vigilant and escalate Abx and other Rx since her response to infectious process will not be normal.  
· Continue home meds as they were · This pt is quite anxious and we will continue clonopin and antidepressants as marked. · There are discrepancies in her listed medications and we will ask pharmacist to reconcile the meds CODE STATUS  Full Functional Status  Pt is residing at local NH She is essentially full assist due to advanced RA and related deformities Surrogate decision maker: Pt's Brother Cl;arence Prophylaxis  Hep SQ Discharge Plan: SNF/LTC, There are currently no Active Isolations [place]: [place] MED[place] / Plan: [place] MEDICARE PART A & B / Product Type: Medicare /   
Social issues  Date Comment 07/07/18 Prognosis  No family or visitor in the room this morning. Subjective Data History of Present Illness : The pt was sent from the NH with complaints of chest pain.  The pt had reportedly fallen about two days ago and since then she has had intermittent pain in her shoulders, arm and chest wall. The pt was noted to have tachycardia. It is also noted that the pt sustained facial injuries including laceration of her forehead (s/p repair) and other injuries after the GLF about two days ago. This morning ER workup has revealed signs of sepsis with tachycardia, lactic acidosis and urine analysis suggestive of UTI . Review of Systems - History obtained from the patient General ROS: positive for  - chills and fatigue 
negative for - weight gain or weight loss Psychological ROS: positive for - anxiety and depression Ophthalmic ROS: negative Respiratory ROS: no cough, shortness of breath, or wheezing Cardiovascular ROS: no dyspnea on exertion Gastrointestinal ROS: no abdominal pain, change in bowel habits, or black or bloody stools Genito-Urinary ROS: no dysuria, trouble voiding, or hematuria Musculoskeletal ROS: positive for - joint stiffness, joint swelling and muscle pain ROS Past Medical History:  
Diagnosis Date  Baker's cyst of knee  Depression  Hypertension  RA (rheumatoid arthritis) ([facility] [address] Past Surgical History:  
Procedure Laterality Date  COLONOSCOPY N/A 2/28/2017 COLONOSCOPY performed by Manuel Cotto MD at [facility] ENDOSCOPY  
 HX HERNIA REPAIR    
 HX UROLOGICAL \"bladder tack\" Social History Substance Use Topics  Smoking status: Former Smoker  Smokeless tobacco: Never Used  Alcohol use No  
   
No family history on file. Objective data Comment:  Anxious elderly lady lying in ER bed in moderate anxiety. Her nurses in the room Patient Vitals for the past 24 hrs: 
 Temp  
07/07/18 1329 97.8 °F (36.6 °C)  
07/07/18 1200 98.4 °F (36.9 °C)  
07/07/18 0713 97.4 °F (36.3 °C) Patient Vitals for the past 24 hrs: 
 Pulse 07/07/18 1329 (!) 104  
07/07/18 1200 (!) 105  
07/07/18 1019 (!) 113  
07/07/18 1018 (!) 112  
07/07/18 1015 (!) 114  
07/07/18 1012 (!) 107  
07/07/18 1009 (!) 107  
07/07/18 1006 (!) 102  
07/07/18 1003 (!) 104  
07/07/18 1000 (!) 102  
07/07/18 0951 (!) 109  
07/07/18 0948 (!) 116  
07/07/18 0945 (!) 109  
07/07/18 0940 (!) 114  
07/07/18 0935 (!) 116  
07/07/18 0932 (!) 114  
07/07/18 0800 95  
07/07/18 0713 (!) 101 Patient Vitals for the past 24 hrs: 
 Resp  
07/07/18 1329 18  
07/07/18 1019 18  
07/07/18 0932 20  
07/07/18 0800 16  
07/07/18 0713 14 Patient Vitals for the past 24 hrs: 
 BP  
07/07/18 1329 151/67  
07/07/18 1200 123/67  
07/07/18 1019 138/74  
07/07/18 1015 138/74  
07/07/18 1000 134/78  
07/07/18 0945 140/81  
07/07/18 0935 147/85  
07/07/18 0932 137/67  
07/07/18 0900 137/67  
07/07/18 0800 145/72  
07/07/18 0713 138/67 SpO2 Readings from Last 6 Encounters:  
07/07/18 99% 07/05/18 93% 08/21/17 95% 04/17/17 97% 03/01/17 94% 01/17/17 95% O2 Device: Room air Body mass index is 30 kg/(m^2). - Wt Readings from Last 10 Encounters:  
07/07/18 74.4 kg (164 lb) 07/04/18 74.5 kg (164 lb 3.9 oz) 08/21/17 71.7 kg (158 lb) 04/17/17 70.8 kg (156 lb)  
02/23/17 79.8 kg (176 lb)  
01/17/17 76.2 kg (168 lb)  
03/23/16 78.5 kg (173 lb) 12/15/15 79.7 kg (175 lb 9.6 oz) 08/04/15 79.8 kg (176 lb) 06/01/15 77.6 kg (171 lb) Physical Exam:            
General:  Recent facial injuries including clean stitches above right eyebrow and black eyes noted. Alert, cooperative,  
well noursished,  
well developed,  
appears stated age Ears/Eyes:  Hearing intact Sclera anicteric. Pupils equal  
Mouth/Throat:  Mucous membranes normal pink and moist 
Oral pharynx clear Neck:    
Lungs:  Trachea midline Chest excursion symmetrical  
Auscultation B/L Symmetrical with Vesicular breath sounds No Crepitations noted Percussion note resonant on mid Clavicular line; no sign of pneumothorax CVS:  Regular rate and rhythm  
no  murmur, No click, rub or gallop S1 normal  
S2 normal  
Pedal pulses  b/l symmetrical  
Abdomen:  Obese Soft, non-tender Bowel sounds normal 
No distension Percussion note tympanitic Extremities:  Advanced RA changes with complex deformities noted on both hands with severe disfiguration of fingers etc.  
No cyanosis, jaundice 
 +  edema noted No sign of DVT/cord like lesion on palpation No sign of acute trauma Age appropriate OA changes noted. Skin:   
Skin color, texture, turgor normal. no acute rash or lesions Lymph nodes: Musculoskeletal Muscle bulk B/L symmetrical  
Neuro Cranial nerves are intact,  
motor movement b/l symmetrical, Sensory evaluation b/l symmetrical   
Psych:  Alert and oriented, ANXIOS  mood & affect given the clinical scenario Medications reviewed Current Facility-Administered Medications Medication Dose Route Frequency  sodium chloride (NS) flush 5-10 mL  5-10 mL IntraVENous PRN  
 clonazePAM (KlonoPIN) disintegrating tablet 0.5 mg  0.5 mg Oral TID  docusate sodium (COLACE) capsule 100 mg  100 mg Oral BID  [START ON 7/0/1680] folic acid (FOLVITE) tablet 1 mg  1 mg Oral DAILY  HYDROcodone-acetaminophen (NORCO) 5-325 mg per tablet 1 Tab  1 Tab Oral Q6H PRN  
 [START ON 7/8/2018] levothyroxine (SYNTHROID) tablet 25 mcg  25 mcg Oral 6am  
 atorvastatin (LIPITOR) tablet 20 mg  20 mg Oral QHS  metoprolol tartrate (LOPRESSOR) tablet 25 mg  25 mg Oral BID  [START ON 7/8/2018] therapeutic multivitamin (THERAGRAN) tablet 1 Tab  1 Tab Oral DAILY  [START ON 7/8/2018] pantoprazole (PROTONIX) tablet 40 mg  40 mg Oral ACB  traZODone (DESYREL) tablet 150 mg  150 mg Oral QHS  [START ON 7/8/2018] venlafaxine-SR (EFFEXOR-XR) capsule 150 mg  150 mg Oral DAILY  sodium chloride (NS) flush 5-10 mL  5-10 mL IntraVENous Q8H  
 acetaminophen (TYLENOL) tablet 500 mg  500 mg Oral Q4H PRN  
 naloxone (NARCAN) injection 0.4 mg  0.4 mg IntraVENous PRN  
 ondansetron [facility]) injection 2 mg  2 mg IntraVENous Q6H PRN  
 bisacodyl (DULCOLAX) tablet 5 mg  5 mg Oral DAILY PRN  
 heparin (porcine) injection 5,000 Units  5,000 Units SubCUTAneous Q8H  
 dextrose 5% and 0.9% NaCl infusion  75 mL/hr IntraVENous CONTINUOUS  
 [START ON 7/8/2018] cefTRIAXone (ROCEPHIN) 1 g in 0.9% sodium chloride (MBP/ADV) 50 mL  1 g IntraVENous Q24H  
 neomycin-polymyxin-dexamethasone (DEXACINE) 3.5 mg/g-10,000 unit/g-0.1 % ophthalmic ointment   Left Eye QID Relevant other informations: Other medical conditions listed in this following active hospital problem list section; all of these and other pertinent data were taken into consideration when treatment plan is developed and customized to this patient's unique overall circumstances and needs. We have reviewed available old medical records within the constraints of this admission process. Present on Admission:  Hypertension  RA (rheumatoid arthritis) ([facility] [address]  Anxiety  Depression Data Review:  
Recent Days: 
All Micro Results Procedure Component Value Units Date/Time CULTURE, URINE [[id number]] Collected:  07/07/18 0759 Order Status:  Completed Specimen:  Urine from Cath Urine Updated:  07/07/18 1417 CULTURE, BLOOD [[id number]] Collected:  07/07/18 1109 Order Status:  Completed Specimen:  Blood from Blood Updated:  07/07/18 1201 CULTURE, BLOOD [[id number]] Collected:  07/07/18 1148 Order Status:  Completed Specimen:  Blood from Blood Updated:  07/07/18 1159 Recent Labs  
   07/07/18 
 0759 07/04/18 2208 WBC  9.1  10.1 HGB  11.0*  11.1*  
HCT  32.3*  31.9*  
PLT  302  326 Recent Labs  
   07/07/18 
 0759 07/05/18 
 0239  07/04/18 2208 NA  132*  130*  123* K  3.8  3.5  3.6 CL  95*  96*  86* CO2  30  28  26 GLU  101*  98  110* BUN  13  8  10 CREA  0.54*  0.42*  0.47* CA  8.9  8.3*  8.4* ALB  3.2*   --   3.0* TBILI  0.4   --   0.3 SGOT  16   --   21 ALT  17   --   16 Lab Results Component Value Date/Time TSH 1.59 02/24/2017 04:30 AM  
 
 
 
Care Plan discussed with: Patient/Family and Nurse Other medical conditions are listed in the active hospital problem list section; these and other pertinent data were taken into consideration when the treatment plan was developed and customized to this patient's unique overall circumstances and needs. High complexity decision making was performed for this patient who is at high risk for decompensation with multiple organ involvement. Today total floor/unit time was 65 minutes while caring for this patient and greater than 50% of that time was spent with patient (and/or family) coordinating patients clinical issues. Trish Naik MD MPH  FACP                              
7/7/2018  
 
 
__________________________________________________________ FOR SOUND PHYSICIAN ADMINISTRATIVE USE ONLY  
MDM 
 
  
INPT 223 Pedro Brannon MD MPH FACP  
5:08 PM 
7/7/2018

## 2018-07-07 NOTE — ED TRIAGE NOTES
Pt arrived via EMS complaining of left sided shoulder and chest soreness after fall 2 days ago. Pt fell on face and has bruising and laceration to forehead, which has been sutured. Pt has bruising to right knee, left hand, and left shoulder. Pt had left eye surgery one week ago. Will continue to monitor.

## 2018-07-07 NOTE — PROGRESS NOTES
PCU SHIFT NURSING NOTE Shift Summary:  
1330 Patient arrived to unit. I assumed care of patient. VSS. A/Ox4. Patient reports severe pain left shoulder and chest 8/10. Lungs bilaterally clear in upper lobes but diminished in the bases. Voiding using purewick external catheter due to incontinence and lack of mobility. Weakness in all four extremities with severe arthritis in fingers and toes causing joint deformity. Patient and patient's daughter indicate that patient is always in bed and does not walk. 1350 In report I was told the patient takes PO meds without difficulty. Gave patient her Norco cut in half and the patient claims the second half of pill is lodged in her throat. Gave remaining meds crushed in applesauce because patient states that is how she normally takes med at [facility] and [address] [place] [address] in GI consult. Spoke with Dr. Steven Sanchez who stated that Dr. Ginna Syed would be in to see the patient tomorrow. Dr. Steven Sanchez looked at patient's esophageal work-up and stated that it is fine for patient to take PO meds with applesauce until Dr. Ginna Syed able to see her tomorrow. There is no esophageal stricture just slow motility. 1750 Gave patient disintegrating tablet of klonopin. Told her to hold it on her tongue and not to swallow. Patient swallowed pill anyway. 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 The entire eyelid is crusted over seemingly from leftover ointment on the last administration. Tried to gently rub eyelid with warm compress but this caused patient substantial discomfort. Left warm compress on eye to try to loosen crusts. 1930 Bedside and Verbal shift change report given to Shu Borjas RN (oncoming nurse) by Sommer Quiroz RN (offgoing nurse). Report included the following information SBAR, Kardex, ED Summary, Procedure Summary, Intake/Output, MAR and Accordion. Admission Date 7/7/2018 Admission Diagnosis Sepsis ([facility] [address] Consults IP CONSULT TO GASTROENTEROLOGY Consults []PT []OT [x]Speech  
[]Case Management  
  
[] Palliative Cardiac Monitoring Order  
[x]Yes []No  
 
IV drips [x]Yes Drip:           D5 in NS                 Dose: 75 ml/hr Drip:                            Dose: 
Drip:                            Dose:  
[]No  
 
GI Prophylaxis [x]Yes []No  
 
 
 
DVT Prophylaxis SCDs:     
     
 Jimenez stockings:     
  
[x] Medication []Contraindicated []None Activity Level Activity Level: Bed Rest   
 Activity Assistance: Complete care Purposeful Rounding every 1-2 hour? [x]Yes Acevedo Score  Total Score: 3 Bed Alarm (If score 3 or >) [x]Yes  
[] Refused (See signed refusal form in chart) Sage Score  Sage Score: 14 Sage Score (if score 14 or less) []PMT consult  
[]Wound Care consult []Specialty bed  
[] Nutrition consult Needs prior to discharge:  
Home O2 required:   
[]Yes  
[x]No  
 If yes, how much O2 required? Other:  
 Last Bowel Movement:    
  
Influenza Vaccine Pneumonia Vaccine Diet Active Orders Diet DIET NPO Except Meds LDAs Peripheral IV 07/07/18 Right Antecubital (Active) Site Assessment Clean, dry, & intact 7/7/2018  7:55 AM  
Phlebitis Assessment 0 7/7/2018  7:55 AM  
Infiltration Assessment 0 7/7/2018  7:55 AM  
Dressing Status Clean, dry, & intact 7/7/2018  7:55 AM  
Dressing Type Transparent 7/7/2018  7:55 AM  
                  
Urinary Catheter Intake & Output Readmission Risk Assessment Tool Score Medium Risk 12 Total Score 3 Has Seen PCP in Last 6 Months (Yes=3, No=0)  
 9 Pt. Coverage (Medicare=5 , Medicaid, or Self-Pay=4) 4 Charlson Comorbidity Score (Age + Comorbid Conditions) Criteria that do not apply:  
 . Living with Significant Other. Assisted Living. LTAC. SNF. or  
Rehab  Patient Length of Stay (>5 days = 3) IP Visits Last 12 Months (1-3=4, 4=9, >4=11) Expected Length of Stay - - - Actual Length of Stay 0

## 2018-07-07 NOTE — ED PROVIDER NOTES
EMERGENCY DEPARTMENT HISTORY AND PHYSICAL EXAM 
 
 
Date: 7/7/2018 Patient Name: Peter Badillo History of Presenting Illness Chief Complaint Patient presents with  Shoulder Pain History Provided By: Patient HPI: Elaine Mary, 78 y.o. female with PMHx significant for Rheumatoid Arthritis, Depression, HTN, presents via EMS to the ED with cc of new onset left chest wall pain since this morning around 3 am. Pt reports left arm pain but points to her left chest wall when describing her pain. She is staying at [facility] after a fall 2 days ago and they called EMS with concern after pt's complaint of chest wall pain. She describes her pain as an 8/10 constant pain that is non radiating. She states her pain is worse with deep breaths. Per EMS pt had a normal EKG on route but noted she was tachycardic at 102. She was admitted to [facility] 2 days ago after a GLF. She was seen in the ED after her fall with bruising, laceration to the forehead, and c/o knee and hand pain. In the ED her laceration was repaired and she had an x-ray of her knee and hand which were both negative for fractures. She denies any new falls while at [facility]. Pt denies any SOB, abdominal pain, nausea, vomiting, diarrhea, fever, sweating, neck pain, HA, leg pain, dysuria, hematuria, urinary frequency, or any other pain. Social Hx: - Tobacco (former smoker), - EtOH (-), - illicit drug use (-) There are no other complaints, changes, or physical findings at this time. PCP: Berny Roe MD 
 
Current Facility-Administered Medications Medication Dose Route Frequency Provider Last Rate Last Dose  sodium chloride (NS) flush 5-10 mL  5-10 mL IntraVENous PRN Argentina Glass MD      
 neomycin-polymyxin-dexamethasone (DEXACIDIN, MAXITROL) 3.5mg/mL-10,000 unit/mL-0.1 % ophthalmic suspension 1 Drop  1 Drop Left Eye NOW Argentina Glass MD      
 clonazePAM (KlonoPIN) disintegrating tablet 1 mg  1 mg Oral NOW Silvestre Morales Margie Shafer MD      
 
Current Outpatient Prescriptions Medication Sig Dispense Refill  docusate sodium (COLACE) 100 mg capsule Take 100 mg by mouth two (2) times a day.  naloxone (NARCAN) 0.4 mg/mL injection 0.4 mg by IntraVENous route as needed.  HUMIRA PEN 40 mg/0.8 mL injection pen INJECT 40MG(1 PEN) UNDER SKIN EVERY WEEK. 4 Kit 6  
 HYDROcodone-acetaminophen (NORCO) 5-325 mg per tablet Take  by mouth every six (6) hours as needed for Pain.  multivitamin (ONE A DAY) tablet Take 1 Tab by mouth daily.  levothyroxine (SYNTHROID) 25 mcg tablet  ferrous sulfate (IRON, FERROUS SULFATE,) 325 mg (65 mg iron) tablet Take 1 Tab by mouth two (2) times a day. 30 Tab 0  
 pantoprazole (PROTONIX) 40 mg tablet Take 1 Tab by mouth Daily (before breakfast). 30 Tab 0  
 traZODone (DESYREL) 150 mg tablet Take 150 mg by mouth nightly.  venlafaxine-SR (EFFEXOR-XR) 150 mg capsule Take  by mouth daily.  folic acid (FOLVITE) 1 mg tablet Take  by mouth daily.  polyvinyl alcohol (LIQUIFILM TEARS) 1.4 % ophthalmic solution Administer 1 Drop to both eyes as needed.  acetaminophen (TYLENOL) 325 mg tablet Take  by mouth every four (4) hours as needed for Pain.  clonazePAM (KLONOPIN) 0.5 mg tablet Take 0.5 mg by mouth three (3) times daily.  lovastatin (MEVACOR) 40 mg tablet Take 40 mg by mouth daily.  metoprolol (LOPRESSOR) 25 mg tablet Take 25 mg by mouth two (2) times a day.  mupirocin calcium (BACTROBAN) 2 % topical cream Apply  to affected area nightly.  OLANZapine (ZYPREXA) 15 mg tablet Take 10 mg by mouth nightly.  methotrexate (RHEUMATREX) 2.5 mg tablet Take 7.5 mg by mouth every Sunday. Takes 6 tabs  SENNA Take 2 Tabs by mouth once.  mirtazapine (REMERON) 45 mg tablet Take 45 mg by mouth nightly.  prednisoLONE acetate (PRED FORTE) 1 % ophthalmic suspension Administer 1 Drop to left eye three (3) times daily. Past History Past Medical History: 
Past Medical History:  
Diagnosis Date  Baker's cyst of knee  Depression  Hypertension  RA (rheumatoid arthritis) ([facility] [address] Past Surgical History: 
Past Surgical History:  
Procedure Laterality Date  COLONOSCOPY N/A 2/28/2017 COLONOSCOPY performed by Wendy Buchanan MD at hospitals ENDOSCOPY  
 HX HERNIA REPAIR    
 HX UROLOGICAL \"bladder tack\" Family History: No family history on file. Social History: 
Social History Substance Use Topics  Smoking status: Former Smoker  Smokeless tobacco: Never Used  Alcohol use No  
 
 
Allergies: Allergies Allergen Reactions  Codeine Nausea Only Review of Systems Review of Systems Constitutional: Negative. Negative for diaphoresis and fever. HENT: Negative. Respiratory: Negative. Negative for cough and shortness of breath. Cardiovascular: Positive for chest pain (left chest wall). Gastrointestinal: Negative. Negative for abdominal pain, diarrhea, nausea and vomiting. Genitourinary: Negative. Negative for dysuria, frequency and hematuria. Musculoskeletal: Negative. Negative for neck pain. Skin: Positive for wound (repaired laceration and bruising). Allergic/Immunologic: Negative. Neurological: Negative. Negative for headaches. All other systems reviewed and are negative. Physical Exam  
Physical Exam  
 
Vital signs and nursing notes reviewed CONSTITUTIONAL: Alert, in mild distress; well-developed; well-nourished. HEAD:  Normocephalic. Aged bruising of the face including forehead, under eyes, and over top of lip. There is a sutured laceration to the right forehead with edges well approximated without erythema or warmth. EYES: PERRL; EOM's intact. ENTM: Nose: no rhinorrhea; Throat: no erythema or exudate, mucous membranes moist but lips are dry. Neck:  Supple. trachea is midline. RESP: Chest clear, equal breath sounds. - W/R/R 
CV: S1 and S2 WNL;  No murmurs, gallops or rubs. 2+ radial and DP pulses bilaterally. HR is 102 in sinus tachycardia. There is reproducible tenderness to the left chest wall, no rib crepitus. GI: non-distended, normal bowel sounds, abdomen soft and non-tender. No masses or organomegaly. : No costo-vertebral angle tenderness. Pelvis is stable and non tender. BACK:  Non-tender, normal appearance UPPER EXT:  Normal inspection. no joint or soft tissue swelling. Severe rheumatoid joint deformity in the hands with bruising over the dorsal surface of the left hand. LOWER EXT: No edema, no calf tenderness. Severe rheumatoid joint deformities of the feet with aged bruising over the right knee. She is able to lift both legs off the bed. NEURO: Alert and oriented x3, 5/5 strength and light touch sensation intact in bilateral upper and lower extremities. SKIN: No rashes; Warm and dry PSYCH: Normal mood, normal affect Diagnostic Study Results Labs - Recent Results (from the past 12 hour(s)) EKG, 12 LEAD, INITIAL Collection Time: 07/07/18  7:40 AM  
Result Value Ref Range Ventricular Rate 96 BPM  
 Atrial Rate 96 BPM  
 P-R Interval 150 ms QRS Duration 88 ms Q-T Interval 350 ms QTC Calculation (Bezet) 442 ms Calculated P Axis 39 degrees Calculated R Axis -26 degrees Calculated T Axis 7 degrees Diagnosis Normal sinus rhythm Voltage criteria for left ventricular hypertrophy When compared with ECG of 23-FEB-2017 22:31, 
Nonspecific T wave abnormality no longer evident in Lateral leads CBC WITH AUTOMATED DIFF Collection Time: 07/07/18  7:59 AM  
Result Value Ref Range WBC 9.1 3.6 - 11.0 K/uL  
 RBC 3.41 (L) 3.80 - 5.20 M/uL  
 HGB 11.0 (L) 11.5 - 16.0 g/dL HCT 32.3 (L) 35.0 - 47.0 % MCV 94.7 80.0 - 99.0 FL  
 MCH 32.3 26.0 - 34.0 PG  
 MCHC 34.1 30.0 - 36.5 g/dL  
 RDW 12.7 11.5 - 14.5 % PLATELET 679 978 - 894 K/uL MPV 9.7 8.9 - 12.9 FL  
 NRBC 0.0 0  WBC ABSOLUTE NRBC 0.00 0.00 - 0.01 K/uL NEUTROPHILS 75 32 - 75 % LYMPHOCYTES 15 12 - 49 % MONOCYTES 8 5 - 13 % EOSINOPHILS 2 0 - 7 % BASOPHILS 0 0 - 1 % IMMATURE GRANULOCYTES 0 0.0 - 0.5 % ABS. NEUTROPHILS 6.8 1.8 - 8.0 K/UL  
 ABS. LYMPHOCYTES 1.4 0.8 - 3.5 K/UL  
 ABS. MONOCYTES 0.7 0.0 - 1.0 K/UL  
 ABS. EOSINOPHILS 0.2 0.0 - 0.4 K/UL  
 ABS. BASOPHILS 0.0 0.0 - 0.1 K/UL  
 ABS. IMM. GRANS. 0.0 0.00 - 0.04 K/UL  
 DF AUTOMATED METABOLIC PANEL, COMPREHENSIVE Collection Time: 07/07/18  7:59 AM  
Result Value Ref Range Sodium 132 (L) 136 - 145 mmol/L Potassium 3.8 3.5 - 5.1 mmol/L Chloride 95 (L) 97 - 108 mmol/L  
 CO2 30 21 - 32 mmol/L Anion gap 7 5 - 15 mmol/L Glucose 101 (H) 65 - 100 mg/dL BUN 13 6 - 20 MG/DL Creatinine 0.54 (L) 0.55 - 1.02 MG/DL  
 BUN/Creatinine ratio 24 (H) 12 - 20 GFR est AA >60 >60 ml/min/1.73m2 GFR est non-AA >60 >60 ml/min/1.73m2 Calcium 8.9 8.5 - 10.1 MG/DL Bilirubin, total 0.4 0.2 - 1.0 MG/DL  
 ALT (SGPT) 17 12 - 78 U/L  
 AST (SGOT) 16 15 - 37 U/L Alk. phosphatase 91 45 - 117 U/L Protein, total 7.4 6.4 - 8.2 g/dL Albumin 3.2 (L) 3.5 - 5.0 g/dL Globulin 4.2 (H) 2.0 - 4.0 g/dL A-G Ratio 0.8 (L) 1.1 - 2.2 URINALYSIS W/ RFLX MICROSCOPIC Collection Time: 07/07/18  7:59 AM  
Result Value Ref Range Color YELLOW/STRAW Appearance CLOUDY (A) CLEAR Specific gravity 1.017 1.003 - 1.030    
 pH (UA) 7.5 5.0 - 8.0 Protein NEGATIVE  NEG mg/dL Glucose NEGATIVE  NEG mg/dL Ketone NEGATIVE  NEG mg/dL Bilirubin NEGATIVE  NEG Blood NEGATIVE  NEG Urobilinogen 0.2 0.2 - 1.0 EU/dL Nitrites NEGATIVE  NEG Leukocyte Esterase SMALL (A) NEG    
 WBC 20-50 0 - 4 /hpf  
 RBC 0-5 0 - 5 /hpf Epithelial cells FEW FEW /lpf Bacteria 4+ (A) NEG /hpf Hyaline cast 0-2 0 - 5 /lpf  
TROPONIN I Collection Time: 07/07/18  7:59 AM  
Result Value Ref Range  Troponin-I, Qt. <0.05 <0.05 ng/mL D DIMER Collection Time: 07/07/18  7:59 AM  
Result Value Ref Range D-dimer 1.78 (H) 0.00 - 0.65 mg/L FEU  
POC LACTIC ACID Collection Time: 07/07/18 10:34 AM  
Result Value Ref Range Lactic Acid (POC) 2.5 (HH) 0.4 - 2.0 mmol/L Radiologic Studies -  
CT Results  (Last 48 hours) 07/07/18 1054  CTA [facility] CONT Final result Impression:  IMPRESSION:  
1. No evidence of pulmonary embolus. 2.  Paraseptal emphysema. 3. Mild bibasilar atelectasis. Narrative:  INDICATION:   Chest pain, acute, pulmonary embolism (PE) suspected; acute  
pleuritic chest pain and elevated d dimer; eval for PE   
   
COMPARISON:  None TECHNIQUE:  Following the uneventful intravenous administration of 100 cc Isovue  
177, thin helical axial images were obtained through the chest. Postprocessing  
was performed. 3D image postprocessing was performed. CT dose reduction was achieved through the use of a standardized protocol  
tailored for this examination and automatic exposure control for dose  
modulation. FINDINGS:  
   
There are no enlarged mediastinal or hilar lymph nodes. The heart size is  
normal.  There is no pericardial effusion. There is a thyroid isthmus nodule  
measuring 15 mm. No filling defect is seen within the pulmonary arterial system to suggest  
pulmonary embolus. The aorta is normal in caliber. There is no focal air space disease. No pulmonary nodule or mass is seen. There  
are no pleural effusions. There is paraseptal emphysema and mild bibasilar  
atelectasis. Limited evaluation of the upper abdomen demonstrates no abnormality. The osseous  
structures are unremarkable. Medical Decision Making I am the first provider for this patient. I reviewed the vital signs, available nursing notes, past medical history, past surgical history, family history and social history.  
 
Vital Signs-Reviewed the patient's vital signs. Patient Vitals for the past 12 hrs: 
 Temp Pulse Resp BP SpO2  
07/07/18 1019 - (!) 113 18 138/74 100 % 07/07/18 1018 - (!) 112 - - 97 % 07/07/18 1015 - (!) 114 - 138/74 99 % 07/07/18 1012 - (!) 107 - - 97 % 07/07/18 1009 - (!) 107 - - 98 % 07/07/18 1006 - (!) 102 - - 98 % 07/07/18 1003 - (!) 104 - - 98 % 07/07/18 1000 - (!) 102 - 134/78 99 % 07/07/18 0951 - (!) 109 - - 99 % 07/07/18 0948 - (!) 116 - - 99 % 07/07/18 0945 - (!) 109 - 140/81 100 % 07/07/18 0940 - (!) 114 - - 100 % 07/07/18 0935 - (!) 116 - 147/85 (!) 64 %  
07/07/18 0932 - (!) 114 20 137/67 100 % 07/07/18 0900 - - - 137/67 (!) 86 % 07/07/18 0800 - 95 16 145/72 99 % 07/07/18 0713 97.4 °F (36.3 °C) (!) 101 14 138/67 99 % Pulse Oximetry Analysis - 98% on RA Cardiac Monitor:  
Rate: 102 bpm 
Rhythm: Sinus Tachycardia Records Reviewed: Nursing Notes, Old Medical Records, Previous Radiology Studies and Previous Laboratory Studies Provider Notes (Medical Decision Making):  
79 y/o female with recent corneal transplant surgery and fall on July 4th resulting in soft tissue injuries and transitioned to [address] returning with left sided chest discomfort. Suspicion for cardiac ischemia is low. Will screen with D-dimer given pleuritic quality and slight tachycardia today. Will provide pain relief. Small fluid bolus given pt is not eating or drinking well at [address]. Repeat labs given sodium was 123 and then 130 at previous visit. Pt is a fall risk and will stay in bed. UA on the 4th shows no UTI, will screen again given decreased mobility after discharge. ED Course:  
Initial assessment performed. The patients presenting problems have been discussed, and they are in agreement with the care plan formulated and outlined with them. I have encouraged them to ask questions as they arise throughout their visit.  
 
PROGRESS NOTE: 
7:40 AM 
EKG interpretation by Eh Valerio MD: Normal Sinus at 80 with normal access, normal intervals, no actue ischemic changes, meets voltage criteria for LVH. PROGRESS NOTE: 
10:17 AM 
Updated pt on UTI and need for CT scan to r/o PE. Pt having extreme difficulty swallowing ginger ale, reports possible aspiration and choking yesterday. CONSULT NOTE:  
11:17 AM 
Jose Tilley MD spoke with Dr. Lawrence Vinson, Specialty: Hospitalist 
Discussed pt's hx, disposition, and available diagnostic and imaging results. Reviewed care plans. Consultant will evaluate pt for admission. Written by CHACHA Tierney, as dictated by Jose Tilley MD. 
 
10:50 AM - I suspect that this patient has an active infection. 10:50 AM - The patient met criteria for severe sepsis at this time. PROVIDER SEPSIS PHYSICAL EXAM EVAL Vital signs reviewed (see nursing documentation for further details): Vitals:  
 07/07/18 1018 07/07/18 1019 07/07/18 1200 07/07/18 1329 BP:  138/74 123/67 151/67 Pulse: (!) 112 (!) 113 (!) 105 (!) 104 Resp:  18  18 Temp:   98.4 °F (36.9 °C) 97.8 °F (36.6 °C) SpO2: 97% 100% 98% 99% Cardiac exam:Tachycardic Pulmonary exam:Clear Lungs Peripheral pulses:Normal 
 
Capillary refill:Normal 
 
Skin exam:pink Exam performed by Jose Tilley MD 
 
Critical Care Time: CRITICAL CARE NOTE : 
11:26 AM 
IMPENDING DETERIORATION -Cardiovascular and Metabolic ASSOCIATED RISK FACTORS - Shock and Metabolic changes MANAGEMENT- Bedside Assessment and Supervision of Care INTERPRETATION -  Xrays, Blood Pressure and Cardiac Output Measures INTERVENTIONS - hemodynamic mngmt and antibiotic therapy CASE REVIEW - Hospitalist, Nursing and Family TREATMENT RESPONSE -Stable PERFORMED BY - Self NOTES   : 
I have spent 37 minutes of critical care time involved in lab review, consultations with specialist, family decision- making, bedside attention and documentation.  During this entire length of time I was immediately available to the patient Mercedez Taylor MD 
 
Disposition: 
ADMIT NOTE: 
11:17 AM 
Patient is being admitted to the hospital by Dr. Shawna Renteria. The results of their tests and reasons for their admission have been discussed with them and/or available family. They convey agreement and understanding for the need to be admitted and for their admission diagnosis. Consultation has been made with the inpatient physician specialist for hospitalization. PLAN: Admit the pt to the hospitalist.  
 
Diagnosis Clinical Impression: 1. Acute cystitis without hematuria 2. Sepsis, due to unspecified organism ([address] 3. Pleuritic chest pain Attestations: This note is prepared by Jaya Mace acting as Scribe for MD Ifeanyi Piper MD : The scribe's documentation has been prepared under my direction and personally reviewed by me in its entirety. I confirm that the note above accurately reflects all work, treatment, procedures, and medical decision making performed by me.

## 2018-07-07 NOTE — PROGRESS NOTES
Pharmacy Medication Reconciliation  
 
-Clarified PTA med list with RX query. PTA medication list was corrected to the following:  
 
Prior to Admission Medications Prescriptions Last Dose Informant Patient Reported? Taking? HUMIRA PEN 40 mg/0.8 mL injection pen   No Yes Sig: INJECT 40MG(1 PEN) UNDER SKIN EVERY WEEK. HYDROcodone-acetaminophen (NORCO) 5-325 mg per tablet 2018 at Unknown time  Yes Yes Sig: Take  by mouth every six (6) hours as needed for Pain. QUEtiapine (SEROQUEL) 100 mg tablet   Yes Yes Sig: Take 50 mg by mouth two (2) times a day. acetaminophen (TYLENOL) 325 mg tablet   Yes Yes Sig: Take  by mouth every four (4) hours as needed for Pain. clonazePAM (KLONOPIN) 1 mg tablet   Yes Yes Sig: Take 1 mg by mouth two (2) times a day. docusate sodium (COLACE) 100 mg capsule 2018 at Unknown time  Yes Yes Sig: Take 100 mg by mouth two (2) times a day. ferrous sulfate (IRON, FERROUS SULFATE,) 325 mg (65 mg iron) tablet 2018 at Unknown time  No Yes Sig: Take 1 Tab by mouth two (2) times a day. folic acid (FOLVITE) 1 mg tablet 2018 at Unknown time  Yes Yes Sig: Take  by mouth daily. levothyroxine (SYNTHROID) 75 mcg tablet   Yes Yes Sig: Take 37.5 mcg by mouth Daily (before breakfast). lovastatin (MEVACOR) 40 mg tablet 2018 at Unknown time  Yes Yes Sig: Take 40 mg by mouth daily. metoprolol (LOPRESSOR) 25 mg tablet 2018 at Unknown time  Yes Yes Sig: Take 25 mg by mouth two (2) times a day. mirtazapine (REMERON) 45 mg tablet Unknown at Unknown time  Yes No  
Sig: Take 45 mg by mouth nightly. multivitamin (ONE A DAY) tablet 2018 at Unknown time  Yes Yes Sig: Take 1 Tab by mouth daily. naloxone (NARCAN) 0.4 mg/mL injection   Yes Yes Si.4 mg by IntraVENous route as needed. pantoprazole (PROTONIX) 40 mg tablet 2018 at Unknown time  No Yes Sig: Take 1 Tab by mouth Daily (before breakfast).   
polyvinyl alcohol (LIQUIFILM TEARS) 1.4 % ophthalmic solution 7/6/2018 at Unknown time  Yes Yes Sig: Administer 1 Drop to both eyes as needed. prednisoLONE acetate (PRED FORTE) 1 % ophthalmic suspension   Yes No  
Sig: Administer 1 Drop to left eye three (3) times daily. traZODone (DESYREL) 100 mg tablet   Yes Yes Sig: Take 200 mg by mouth nightly. venlafaxine-SR (EFFEXOR-XR) 150 mg capsule 7/6/2018 at Unknown time  Yes Yes Sig: Take  by mouth daily. Facility-Administered Medications: None Thank you, [address], [place]

## 2018-07-07 NOTE — ED NOTES
Dr. Gunjan Hudson asked me to place pt on Cardiac Monitor before giving the diltiazem Noted Sinus Tachy Pt had just had a period of belching constantly about 850am 
She has been having this problem she says ongoing

## 2018-07-07 NOTE — ROUTINE PROCESS
TRANSFER - OUT REPORT: 
 
Verbal report given to Dione(name) on June Williams  being transferred to [facility](unit) for routine progression of care Report consisted of patients Situation, Background, Assessment and  
Recommendations(SBAR). Information from the following report(s) SBAR, Kardex, ED Summary and Cardiac Rhythm Florenciacandelaria Scott was reviewed with the receiving nurse. Lines:  
Peripheral IV 07/07/18 Right Antecubital (Active) Site Assessment Clean, dry, & intact 7/7/2018  7:55 AM  
Phlebitis Assessment 0 7/7/2018  7:55 AM  
Infiltration Assessment 0 7/7/2018  7:55 AM  
Dressing Status Clean, dry, & intact 7/7/2018  7:55 AM  
Dressing Type Transparent 7/7/2018  7:55 AM  
  
 
Opportunity for questions and clarification was provided. Patient transported with: 
 [email or web address]

## 2018-07-07 NOTE — ROUTINE PROCESS
TRANSFER - IN REPORT: 
 
Verbal report received from Mariza Whitten RN (name) on June Terrea Tino  being received from ED (unit) for routine progression of care Report consisted of patients Situation, Background, Assessment and  
Recommendations(SBAR). Information from the following report(s) SBAR, Kardex, ED Summary, Procedure Summary, Intake/Output and MAR was reviewed with the receiving nurse. Opportunity for questions and clarification was provided. Assessment completed upon patients arrival to unit and care assumed.

## 2018-07-07 NOTE — ROUTINE PROCESS
Primary Nurse Edgar Goodpasture and Treva Jones RN performed a dual skin assessment on this patient Impairment noted- see wound doc flow sheet Sage score is 14 Ecchymoses RUE and LLE. Nodules bilaterally on pads of feets with scabbing. Blanchable redness to gluteal fold. Blanchable dark pink spot on heel and spider veins on foot of RLE. Bilateral periorbital ecchymoses, incision with stitches right anterior forehead, ecchymosis upper lip.

## 2018-07-08 LAB
ANION GAP SERPL CALC-SCNC: 4 MMOL/L (ref 5–15)
BASOPHILS # BLD: 0 K/UL (ref 0–0.1)
BASOPHILS NFR BLD: 0 % (ref 0–1)
BUN SERPL-MCNC: 4 MG/DL (ref 6–20)
BUN/CREAT SERPL: 10 (ref 12–20)
CALCIUM SERPL-MCNC: 8.8 MG/DL (ref 8.5–10.1)
CHLORIDE SERPL-SCNC: 102 MMOL/L (ref 97–108)
CO2 SERPL-SCNC: 30 MMOL/L (ref 21–32)
CREAT SERPL-MCNC: 0.41 MG/DL (ref 0.55–1.02)
DIFFERENTIAL METHOD BLD: ABNORMAL
EOSINOPHIL # BLD: 0.1 K/UL (ref 0–0.4)
EOSINOPHIL NFR BLD: 2 % (ref 0–7)
ERYTHROCYTE [DISTWIDTH] IN BLOOD BY AUTOMATED COUNT: 12.9 % (ref 11.5–14.5)
GLUCOSE SERPL-MCNC: 117 MG/DL (ref 65–100)
HCT VFR BLD AUTO: 30.7 % (ref 35–47)
HGB BLD-MCNC: 9.9 G/DL (ref 11.5–16)
IMM GRANULOCYTES # BLD: 0 K/UL (ref 0–0.04)
IMM GRANULOCYTES NFR BLD AUTO: 0 % (ref 0–0.5)
LYMPHOCYTES # BLD: 1.4 K/UL (ref 0.8–3.5)
LYMPHOCYTES NFR BLD: 18 % (ref 12–49)
MCH RBC QN AUTO: 31.4 PG (ref 26–34)
MCHC RBC AUTO-ENTMCNC: 32.2 G/DL (ref 30–36.5)
MCV RBC AUTO: 97.5 FL (ref 80–99)
MONOCYTES # BLD: 0.5 K/UL (ref 0–1)
MONOCYTES NFR BLD: 7 % (ref 5–13)
NEUTS SEG # BLD: 5.6 K/UL (ref 1.8–8)
NEUTS SEG NFR BLD: 72 % (ref 32–75)
NRBC # BLD: 0 K/UL (ref 0–0.01)
NRBC BLD-RTO: 0 PER 100 WBC
PLATELET # BLD AUTO: 277 K/UL (ref 150–400)
PMV BLD AUTO: 9.2 FL (ref 8.9–12.9)
POTASSIUM SERPL-SCNC: 4.1 MMOL/L (ref 3.5–5.1)
RBC # BLD AUTO: 3.15 M/UL (ref 3.8–5.2)
SODIUM SERPL-SCNC: 136 MMOL/L (ref 136–145)
WBC # BLD AUTO: 7.8 K/UL (ref 3.6–11)

## 2018-07-08 PROCEDURE — 74011250637 HC RX REV CODE- 250/637: Performed by: INTERNAL MEDICINE

## 2018-07-08 PROCEDURE — 96375 TX/PRO/DX INJ NEW DRUG ADDON: CPT

## 2018-07-08 PROCEDURE — 36415 COLL VENOUS BLD VENIPUNCTURE: CPT | Performed by: INTERNAL MEDICINE

## 2018-07-08 PROCEDURE — 74011250636 HC RX REV CODE- 250/636: Performed by: INTERNAL MEDICINE

## 2018-07-08 PROCEDURE — 80048 BASIC METABOLIC PNL TOTAL CA: CPT | Performed by: INTERNAL MEDICINE

## 2018-07-08 PROCEDURE — 85025 COMPLETE CBC W/AUTO DIFF WBC: CPT | Performed by: INTERNAL MEDICINE

## 2018-07-08 PROCEDURE — 96366 THER/PROPH/DIAG IV INF ADDON: CPT

## 2018-07-08 PROCEDURE — 74011000258 HC RX REV CODE- 258: Performed by: INTERNAL MEDICINE

## 2018-07-08 PROCEDURE — 99218 HC RM OBSERVATION: CPT

## 2018-07-08 PROCEDURE — 96372 THER/PROPH/DIAG INJ SC/IM: CPT

## 2018-07-08 PROCEDURE — 65390000012 HC CONDITION CODE 44 OBSERVATION

## 2018-07-08 PROCEDURE — 96361 HYDRATE IV INFUSION ADD-ON: CPT

## 2018-07-08 RX ORDER — MORPHINE SULFATE 2 MG/ML
2 INJECTION, SOLUTION INTRAMUSCULAR; INTRAVENOUS
Status: DISCONTINUED | OUTPATIENT
Start: 2018-07-08 | End: 2018-07-10

## 2018-07-08 RX ADMIN — MORPHINE SULFATE 2 MG: 2 INJECTION, SOLUTION INTRAMUSCULAR; INTRAVENOUS at 00:55

## 2018-07-08 RX ADMIN — CLONAZEPAM 0.5 MG: 0.5 TABLET, ORALLY DISINTEGRATING ORAL at 09:45

## 2018-07-08 RX ADMIN — ATORVASTATIN CALCIUM 20 MG: 20 TABLET, FILM COATED ORAL at 21:17

## 2018-07-08 RX ADMIN — DEXTROSE MONOHYDRATE AND SODIUM CHLORIDE 75 ML/HR: 5; .9 INJECTION, SOLUTION INTRAVENOUS at 01:33

## 2018-07-08 RX ADMIN — HEPARIN SODIUM 5000 UNITS: 5000 INJECTION, SOLUTION INTRAVENOUS; SUBCUTANEOUS at 05:09

## 2018-07-08 RX ADMIN — CLONAZEPAM 0.5 MG: 0.5 TABLET, ORALLY DISINTEGRATING ORAL at 16:28

## 2018-07-08 RX ADMIN — Medication 10 ML: at 05:09

## 2018-07-08 RX ADMIN — CEFTRIAXONE: 1 INJECTION, POWDER, FOR SOLUTION INTRAMUSCULAR; INTRAVENOUS at 09:44

## 2018-07-08 RX ADMIN — NEOMYCIN SULFATE, POLYMYXIN B SULFATE, AND DEXAMETHASONE: 3.5; 10000; 1 OINTMENT OPHTHALMIC at 21:29

## 2018-07-08 RX ADMIN — TRAZODONE HYDROCHLORIDE 150 MG: 100 TABLET ORAL at 21:17

## 2018-07-08 RX ADMIN — ACETAMINOPHEN 500 MG: 500 TABLET, FILM COATED ORAL at 13:26

## 2018-07-08 RX ADMIN — CLONAZEPAM 0.5 MG: 0.5 TABLET, ORALLY DISINTEGRATING ORAL at 21:17

## 2018-07-08 RX ADMIN — FOLIC ACID 1 MG: 1 TABLET ORAL at 09:00

## 2018-07-08 RX ADMIN — ACETAMINOPHEN 500 MG: 500 TABLET, FILM COATED ORAL at 20:19

## 2018-07-08 RX ADMIN — NEOMYCIN SULFATE, POLYMYXIN B SULFATE, AND DEXAMETHASONE: 3.5; 10000; 1 OINTMENT OPHTHALMIC at 09:47

## 2018-07-08 RX ADMIN — Medication 10 ML: at 21:17

## 2018-07-08 RX ADMIN — METOPROLOL TARTRATE 25 MG: 25 TABLET ORAL at 17:55

## 2018-07-08 RX ADMIN — METOPROLOL TARTRATE 25 MG: 25 TABLET ORAL at 09:00

## 2018-07-08 RX ADMIN — HEPARIN SODIUM 5000 UNITS: 5000 INJECTION, SOLUTION INTRAVENOUS; SUBCUTANEOUS at 21:17

## 2018-07-08 RX ADMIN — Medication 10 ML: at 13:00

## 2018-07-08 RX ADMIN — HYDROCODONE BITARTRATE AND ACETAMINOPHEN 1 TABLET: 5; 325 TABLET ORAL at 16:28

## 2018-07-08 RX ADMIN — VENLAFAXINE HYDROCHLORIDE 150 MG: 150 CAPSULE, EXTENDED RELEASE ORAL at 09:45

## 2018-07-08 RX ADMIN — NEOMYCIN SULFATE, POLYMYXIN B SULFATE, AND DEXAMETHASONE: 3.5; 10000; 1 OINTMENT OPHTHALMIC at 12:55

## 2018-07-08 RX ADMIN — HYDROCODONE BITARTRATE AND ACETAMINOPHEN 1 TABLET: 5; 325 TABLET ORAL at 09:55

## 2018-07-08 RX ADMIN — MORPHINE SULFATE 2 MG: 2 INJECTION, SOLUTION INTRAMUSCULAR; INTRAVENOUS at 22:17

## 2018-07-08 RX ADMIN — DEXTROSE MONOHYDRATE AND SODIUM CHLORIDE 75 ML/HR: 5; .9 INJECTION, SOLUTION INTRAVENOUS at 14:19

## 2018-07-08 RX ADMIN — HEPARIN SODIUM 5000 UNITS: 5000 INJECTION, SOLUTION INTRAVENOUS; SUBCUTANEOUS at 12:55

## 2018-07-08 RX ADMIN — NEOMYCIN SULFATE, POLYMYXIN B SULFATE, AND DEXAMETHASONE: 3.5; 10000; 1 OINTMENT OPHTHALMIC at 17:55

## 2018-07-08 NOTE — PROGRESS NOTES
Hospitalist Progress Note NAME: Toy Martinez :  1940 MRN:  [id number] Assessment / Plan: 
Sepsis possibly due to UTI, from UA  POA Lactic acid Nl, BP is Within Acceptable Range. WBC Within Acceptable Range. Afebrile. will wait for blood and urine cultures. Will continue to monitor vitals. Hypertension: continue the metoprolol RA (rheumatoid arthritis) ([address] Humira Q week Anxiety and Depression Continue effexor and clonipine. Dysphagia , unspecified yet. s/p barium swallow Report as below Markedly tertiary wave activity involving the esophagus with significant 
to-and-fro action of contrast within the esophagus and relatively poor emptying. GI called. Will keep her NPO except meds Wound on the forehead and bruising around the right eye , after a fall from a week ago before hospital admission. Eye drops and wound care. Body mass index is 30 kg/(m^2).: 30.0 - 39.9 Obese Code status: Full Prophylaxis: Hep SQ Recommended Disposition: TBD Subjective: Chief Complaint / Reason for Physician Visit She said her eye bothering her, she has eye drop to apply and she said she fall over a week ago hurt her forehead. .  Discussed with RN events overnight. Review of Systems: 
Symptom Y/N Comments  Symptom Y/N Comments Fever/Chills n   Chest Pain n   
Poor Appetite    Edema Cough n   Abdominal Pain n   
Sputum    Joint Pain SOB/MADERA n   Pruritis/Rash Nausea/vomit    Tolerating PT/OT Diarrhea    Tolerating Diet Constipation    Other Could NOT obtain due to:   
 
Objective: VITALS:  
Last 24hrs VS reviewed since prior progress note. Most recent are: 
Patient Vitals for the past 24 hrs: 
 Temp Pulse Resp BP SpO2  
18 0849 - - - - 99 % 18 0847 98 °F (36.7 °C) 87 17 137/71 98 % 18 0412 98 °F (36.7 °C) (!) 105 16 133/73 98 % 18 0300 - 90 - - -  
18 0200 - 88 - - 98 % 18 0100 - (!) 104 - - 99 % 18 0003 - 100 - - -  
07/07/18 2337 98 °F (36.7 °C) 83 18 147/81 97 % 07/07/18 2300 - 96 - - 100 % 07/07/18 2200 - 90 - - 100 % 07/07/18 2100 - 93 - - 99 % 07/07/18 2014 97.9 °F (36.6 °C) 85 18 136/75 99 % 07/07/18 2000 - 90 - - 100 % 07/07/18 1900 - 92 - - -  
07/07/18 1329 97.8 °F (36.6 °C) (!) 104 18 151/67 99 % 07/07/18 1200 98.4 °F (36.9 °C) (!) 105 - 123/67 98 % 07/07/18 1019 - (!) 113 18 138/74 100 % 07/07/18 1018 - (!) 112 - - 97 % 07/07/18 1015 - (!) 114 - 138/74 99 % 07/07/18 1012 - (!) 107 - - 97 % 07/07/18 1009 - (!) 107 - - 98 % 07/07/18 1006 - (!) 102 - - 98 % 07/07/18 1003 - (!) 104 - - 98 % 07/07/18 1000 - (!) 102 - 134/78 99 % 07/07/18 0951 - (!) 109 - - 99 % 07/07/18 0948 - (!) 116 - - 99 % 07/07/18 0945 - (!) 109 - 140/81 100 % 07/07/18 0940 - (!) 114 - - 100 % 07/07/18 0935 - (!) 116 - 147/85 (!) 64 %  
07/07/18 0932 - (!) 114 20 137/67 100 % Intake/Output Summary (Last 24 hours) at 07/08/18 4970 Last data filed at 07/08/18 9303 Gross per 24 hour Intake          1321.25 ml Output             2500 ml Net         -1178.75 ml PHYSICAL EXAM: 
General: WD, WN. Alert, cooperative, no acute distress , wound on forehead after the fall healing well, and bruising around the right eye after the fall before the hospital admission. EENT:  EOMI. Anicteric sclerae. MMM Resp:  CTA bilaterally, no wheezing or rales. No accessory muscle use CV:  Regular  rhythm,  No edema GI:  Soft, Non distended, Non tender.  +Bowel sounds Neurologic:  Alert and oriented X 3, normal speech, Psych:   Good insight. Not anxious nor agitated Deformities in both upper and lower extremity , the rheumatoid arthritis seen. Reviewed most current lab test results and cultures  YES Reviewed most current radiology test results   YES Review and summation of old records today    NO Reviewed patient's current orders and MAR    YES 
PMH/SH reviewed - no change compared to H&P 
________________________________________________________________________ Care Plan discussed with: 
  Comments Patient Family RN Care Manager Consultant Multidiciplinary team rounds were held today with , nursing, pharmacist and clinical coordinator. Patient's plan of care was discussed; medications were reviewed and discharge planning was addressed. ________________________________________________________________________ Total NON critical care TIME:  45   Minutes Total CRITICAL CARE TIME Spent:   Minutes non procedure based Comments >50% of visit spent in counseling and coordination of care    
________________________________________________________________________ Tracey Arriaza MD  
 
Procedures: see electronic medical records for all procedures/Xrays and details which were not copied into this note but were reviewed prior to creation of Plan. LABS: 
I reviewed today's most current labs and imaging studies. Pertinent labs include: 
Recent Labs  
   07/08/18 0415  07/07/18 
 0759 WBC  7.8  9.1 HGB  9.9*  11.0*  
HCT  30.7*  32.3*  
PLT  277  302 Recent Labs  
   07/08/18 
 0415  07/07/18 
 0759 NA  136  132* K  4.1  3.8 CL  102  95* CO2  30  30 GLU  117*  101* BUN  4*  13  
CREA  0.41*  0.54* CA  8.8  8.9 ALB   --   3.2* TBILI   --   0.4 SGOT   --   16 ALT   --   17 Signed:  Tracey Arriaza MD

## 2018-07-08 NOTE — CONSULTS
Gastroenterology Consultation Note  Dr. Sascha Collier    NAME: Una Degroot : 1940 MRN: [id number]   PCP: Tanesha Cohen MD  Date/Time:  2018 12:01 PM  Subjective:   REASON FOR CONSULT:      Elaine Zabala is a 78 y.o.  female who I was asked to see for difficulty swallowing. She has a h/o RA and resides in [address] [place] [address] and has been noticing progressive difficulty with swallowing all food consistencies over the past month. She points toward her sternum and says that the food \"won't go down past here. \"  No aspiration or coughing with eating. She has had to cut down on her oral intake and has lost some weight and \"felt weak. \"  Then she suffered a fall at her nursing home and was taken to [facility]. Initial evaluation was with Barium Swallow 18 showing:    FINDINGS: Preliminary image of the chest demonstrates top normal to borderline  heart size. Coarsened interstitial markings in both lungs. No confluent airspace  disease. The patient swallows contrast without difficulty.     There is marked tertiary wave activity with significant to and fro motion of  contrast within the esophagus with relatively poor emptying into the stomach. No  obvious mass lesion or area of abnormal constriction identified. Mild mucosal thickening of esophageal folds suggesting possible esophagitis. IMPRESSION:  Markedly tertiary wave activity involving the esophagus with significant  to-and-fro action of contrast within the esophagus and relatively poor emptying.     She recently underwent EGD and Colonoscopy 18 for iron deficiency anemia and on EGD was noted to have Candida Esophagitis as well as 3 cm HH with nonobstructing esophageal ring. Not clear from records if she received fluconazole tx for Candida.     Past Medical History:   Diagnosis Date    Baker's cyst of knee     Depression     Hypertension     RA (rheumatoid [address]) ([facility] [address]       Past Surgical History: Procedure Laterality Date    COLONOSCOPY N/A 2/28/2017    COLONOSCOPY performed by Bhargav Gonzalez MD at [facility] ENDOSCOPY    HX HERNIA REPAIR      HX UROLOGICAL      \"bladder tack\"     Social History   Substance Use Topics    Smoking status: Former Smoker    Smokeless tobacco: Never Used    Alcohol use No      No family history on file. Allergies   Allergen Reactions    Codeine Nausea Only      Home Medications:  Prior to Admission Medications   Prescriptions Last Dose Informant Patient Reported? Taking? HUMIRA PEN 40 mg/0.8 mL injection pen 6/29/2018  No Yes   Sig: INJECT 40MG(1 PEN) UNDER SKIN EVERY WEEK. HYDROcodone-acetaminophen (NORCO) 5-325 mg per tablet 7/8/2018 at Unknown time  Yes Yes   Sig: Take  by mouth every six (6) hours as needed for Pain. QUEtiapine (SEROQUEL) 100 mg tablet 7/6/2018  Yes Yes   Sig: Take 50 mg by mouth two (2) times a day. acetaminophen (TYLENOL) 325 mg tablet 7/2/2018 at  time  Yes Yes   Sig: Take  by mouth every four (4) hours as needed for Pain. clonazePAM (KLONOPIN) 1 mg tablet 7/8/2018 at time  Yes Yes   Sig: Take 1 mg by mouth two (2) times a day. docusate sodium (COLACE) 100 mg capsule 7/8/2018 at Unknown time  Yes Yes   Sig: Take 100 mg by mouth two (2) times a day. ferrous sulfate (IRON, FERROUS SULFATE,) 325 mg (65 mg iron) tablet 7/8/2018  No Yes   Sig: Take 1 Tab by mouth two (2) times a day. folic acid (FOLVITE) 1 mg tablet 7/8/2018 at Unknown time  Yes Yes   Sig: Take  by mouth daily. levothyroxine (SYNTHROID) 75 mcg tablet 7/6/2018  Yes Yes   Sig: Take 37.5 mcg by mouth Daily (before breakfast). lovastatin (MEVACOR) 40 mg tablet 7/6/2018 at Unknown time  Yes Yes   Sig: Take 40 mg by mouth daily. metoprolol (LOPRESSOR) 25 mg tablet 7/8/2018 at Unknown time  Yes Yes   Sig: Take 25 mg by mouth two (2) times a day. mirtazapine (REMERON) 45 mg tablet 7/6/2018 at Unknown time  Yes No   Sig: Take 45 mg by mouth nightly.    multivitamin (ONE A DAY) tablet 2018 at Unknown time  Yes Yes   Sig: Take 1 Tab by mouth daily. naloxone (NARCAN) 0.4 mg/mL injection   Yes Yes   Si.4 mg by IntraVENous route as needed. pantoprazole (PROTONIX) 40 mg tablet 2018 at Unknown time  No Yes   Sig: Take 1 Tab by mouth Daily (before breakfast). polyvinyl alcohol (LIQUIFILM TEARS) 1.4 % ophthalmic solution 2018 at Unknown time  Yes Yes   Sig: Administer 1 Drop to both eyes as needed. prednisoLONE acetate (PRED FORTE) 1 % ophthalmic suspension 2018  Yes No   Sig: Administer 1 Drop to left eye three (3) times daily. traZODone (DESYREL) 100 mg tablet 2018 at Unknown time  Yes Yes   Sig: Take 200 mg by mouth nightly. venlafaxine-SR (EFFEXOR-XR) 150 mg capsule 2018 at Unknown time  Yes Yes   Sig: Take  by mouth daily.       Facility-Administered Medications: None     Hospital medications:  Current Facility-Administered Medications   Medication Dose Route Frequency    morphine injection 2 mg  2 mg IntraVENous Q4H PRN    sodium chloride (NS) flush 5-10 mL  5-10 mL IntraVENous PRN    clonazePAM (KlonoPIN) disintegrating tablet 0.5 mg  0.5 mg Oral TID    docusate sodium (COLACE) capsule 100 mg  100 mg Oral BID    folic acid (FOLVITE) tablet 1 mg  1 mg Oral DAILY    HYDROcodone-acetaminophen (NORCO) 5-325 mg per tablet 1 Tab  1 Tab Oral Q6H PRN    levothyroxine (SYNTHROID) tablet 25 mcg  25 mcg Oral 6am    atorvastatin (LIPITOR) tablet 20 mg  20 mg Oral QHS    metoprolol tartrate (LOPRESSOR) tablet 25 mg  25 mg Oral BID    therapeutic multivitamin (THERAGRAN) tablet 1 Tab  1 Tab Oral DAILY    pantoprazole (PROTONIX) tablet 40 mg  40 mg Oral ACB    traZODone (DESYREL) tablet 150 mg  150 mg Oral QHS    venlafaxine-SR (EFFEXOR-XR) capsule 150 mg  150 mg Oral DAILY    sodium chloride (NS) flush 5-10 mL  5-10 mL IntraVENous Q8H    acetaminophen (TYLENOL) tablet 500 mg  500 mg Oral Q4H PRN    naloxone (NARCAN) injection 0.4 mg  0.4 mg IntraVENous PRN    ondansetron (ZOFRAN) injection 2 mg  2 mg IntraVENous Q6H PRN    bisacodyl (DULCOLAX) tablet 5 mg  5 mg Oral DAILY PRN    heparin (porcine) injection 5,000 Units  5,000 Units SubCUTAneous Q8H    dextrose 5% and 0.9% NaCl infusion  75 mL/hr IntraVENous CONTINUOUS    cefTRIAXone (ROCEPHIN) 1 g in 0.9% sodium chloride (MBP/ADV) 50 mL  1 g IntraVENous Q24H    neomycin-polymyxin-dexamethasone (DEXACINE) 3.5 mg/g-10,000 unit/g-0.1 % ophthalmic ointment   Left Eye QID     REVIEW OF SYSTEMS:      History obtained from chart review and the patient  General ROS: positive for  - fatigue and weight loss  Psychological ROS: negative  Hematological and Lymphatic ROS: positive for - bleeding problems  Respiratory ROS: positive for - shortness of breath  Cardiovascular ROS: negative for - chest pain or palpitations  Gastrointestinal ROS: see HPI  Genito-Urinary ROS: no dysuria, trouble voiding, or hematuria  Musculoskeletal ROS: negative  Neurological ROS: no TIA or stroke symptoms  Dermatological ROS: negative    Objective:   VITALS:    Visit Vitals    /70 (BP 1 Location: Left arm, BP Patient Position: At rest)    Pulse 83    Temp 98 °F (36.7 °C)    Resp 18    Ht 5' 2\" (1.575 m)    Wt 73.9 kg (163 lb)    SpO2 99%    Breastfeeding No    BMI 29.81 kg/m2     Temp (24hrs), Av °F (36.7 °C), Min:97.8 °F (36.6 °C), Max:98 °F (36.7 °C)    PHYSICAL EXAM:   General:    Alert, cooperative, no distress, appears stated age.n  Wearing sunglasses with staples on scalp wound     Head:   Normocephalic  Eyes:   Conjunctivae clear, anicteric sclerae. Pupils are equal  Nose:  Nares normal. No drainage or sinus tenderness. Throat:    Lips, mucosa, and tongue normal.  No Thrush  Neck:  Supple, symmetrical,  no adenopathy, thyroid: non tender  Lungs:   CTA bilaterally. No wheezing/rhonchi/rales. Heart:   Regular rate and rhythm,  no murmur, rub or gallop. Abdomen:   Soft, non-tender. Not distended.   Bowel sounds normal. No masses. No hepatosplenomegaly. No shifting dullness. Rectal:  deferred  Extremities: No cyanosis. No edema. No clubbing  Skin:     + eccymoses on scalp and UE b/l  Lymph: Cervical, supraclavicular normal.  Psych:  Good insight. Not depressed. Not anxious or agitated. Neurologic: Awake and alert elderly WF      LAB DATA REVIEWED:    Lab Results   Component Value Date/Time    WBC 7.8 07/08/2018 04:15 AM    HGB 9.9 (L) 07/08/2018 04:15 AM    HCT 30.7 (L) 07/08/2018 04:15 AM    PLATELET 701 00/97/5045 04:15 AM    MCV 97.5 07/08/2018 04:15 AM     Lab Results   Component Value Date/Time    ALT (SGPT) 17 07/07/2018 07:59 AM    AST (SGOT) 16 07/07/2018 07:59 AM    Alk. phosphatase 91 07/07/2018 07:59 AM    Bilirubin, total 0.4 07/07/2018 07:59 AM     Lab Results   Component Value Date/Time    Sodium 136 07/08/2018 04:15 AM    Potassium 4.1 07/08/2018 04:15 AM    Chloride 102 07/08/2018 04:15 AM    CO2 30 07/08/2018 04:15 AM    Anion gap 4 (L) 07/08/2018 04:15 AM    Glucose 117 (H) 07/08/2018 04:15 AM    BUN 4 (L) 07/08/2018 04:15 AM    Creatinine 0.41 (L) 07/08/2018 04:15 AM    BUN/Creatinine ratio 10 (L) 07/08/2018 04:15 AM    GFR est AA >60 07/08/2018 04:15 AM    GFR est non-AA >60 07/08/2018 04:15 AM    Calcium 8.8 07/08/2018 04:15 AM     No results found for: LPSE  Lab Results   Component Value Date/Time    INR 1.1 02/23/2017 10:40 PM    Prothrombin time 10.9 02/23/2017 10:40 PM       Impression:    Dysphagia, progressive    Abnormal esophageal motility on barium swallow    Recently dx candida esophagitis    Sepsis     Hypertension     RA (rheumatoid arthritis)    Anxiety & Depression  Plan:  Patient with progressive dysphagia with abnormal motility noted on her barium swallow for which could have a motility disorder or achalasia and this could account for her previously dx Candida esopahgitis that could still be present and exacerbating her dysphagia.   -first step in evaluation would be to repeat EGD to see if stricture present, r/o Candida and to perform dilation of esophagus and see if this helps  -keep on pureed diet  -would have speech path to see  -treat for Candida pending EGD result  -eventually will likely require esophageal manometry that can be outpatient  -keep HOB elevated  -PEG option discussed briefly with pt and she was rather shocked by this option and hopes to avoid it       ______________________________________________  Care Plan discussed with:    [x]    Patient   []    Family   [x]    Nursing   []    Attending   ___________________________________________________  GI: Sangeeta Wilkerson MD

## 2018-07-08 NOTE — PROGRESS NOTES
Bedside report received from [facility]  and care assumed. Report given with SBAR , recent labs, land MAR . Pt in bed bilateral perioribal bruising noted. Pt s/p corneal transplant on 7/3, dark shade in place. Pt c/o pill stuck in throat . Carie Melgar reassured pt that pill was disintegrating  Pill and that it should have passed . Pt requesting ginger ale and apparently had been given although orders are NPO . Re iterated this point with pt who continues to requests water and ginger ale outside of medications. 2000 pt medicated for pain with Norco , crushed in apple sauce which pt tolerated without incident ( no coughing or choking noted ) . 2100 pt calling for eye ointment and continues to complain about 1400 pill still being stuck . 2200 pt given med's in apple sauce , followed with small amount of water . Pt continually belches after each sip and even forces herself to do so . Discussed need to observe MD orders and cannot give her water or ginger ale at will . 2230 Eye ointment attempted however lids difficult to . Able to get half of lids  to place ointment. 2310 pt has called out 15 times for water states there is a pill stuck despite several sips she continues to ring out . 0030 pt requesting pain medication however due to pt constant complaint of pill being stuck , Tele hospitalist consulted for IV pain medication to alleviate or decrease pt anxiety related pill being stuck in throat. Pt made aware of change and will offer medication as soon as readily available . 0130 pt hesitant to take IV pain medication insists I give her PO med stating the pill finally went down . Pt reminded that she called out 15-20 times stating the pill was stuck . Medicated with morphine 2 mg IV . Pt now resting in bed 
0600 pt resting, remained NPO after 0100 
0700 Bedside shift change report given to [facility] RN (oncoming nurse) by me (offgoing nurse). Report given with SBAR, MAR and Recent Results.

## 2018-07-08 NOTE — ROUTINE PROCESS
Bedside shift change report given to [id number] (oncoming nurse) by Massiel Gutierrez RN (offgoing nurse). Report included the following information SBAR.

## 2018-07-08 NOTE — PROGRESS NOTES
Problem: Pressure Injury - Risk of  Goal: *Prevention of pressure injury  Document Sage Scale and appropriate interventions in the flowsheet. Outcome: Progressing Towards Goal  Pressure Injury Interventions:       Moisture Interventions: Internal/External urinary devices    Activity Interventions: Assess need for specialty bed, Increase time out of bed    Mobility Interventions: PT/OT evaluation    Nutrition Interventions: Document food/fluid/supplement intake                    Problem: Falls - Risk of  Goal: *Absence of Falls  Document Reinaldo Fall Risk and appropriate interventions in the flowsheet. Fall Risk Interventions:  Mobility Interventions: Bed/chair exit alarm         Medication Interventions: Assess postural VS orthostatic hypotension, Bed/chair exit alarm    Elimination Interventions: Call light in reach    History of Falls Interventions:  Investigate reason for fall

## 2018-07-08 NOTE — PROGRESS NOTES
Problem: Dysphagia (Adult)  Goal: Interventions  Outcome: Progressing Towards Goal  Pt NPO except for meds

## 2018-07-09 ENCOUNTER — ANESTHESIA (OUTPATIENT)
Dept: ENDOSCOPY | Age: 78
DRG: 872 | End: 2018-07-09
Payer: MEDICARE

## 2018-07-09 ENCOUNTER — ANESTHESIA EVENT (OUTPATIENT)
Dept: ENDOSCOPY | Age: 78
DRG: 872 | End: 2018-07-09
Payer: MEDICARE

## 2018-07-09 LAB
ANION GAP SERPL CALC-SCNC: 6 MMOL/L (ref 5–15)
BACTERIA SPEC CULT: ABNORMAL
BASOPHILS # BLD: 0 K/UL (ref 0–0.1)
BASOPHILS NFR BLD: 0 % (ref 0–1)
BUN SERPL-MCNC: 7 MG/DL (ref 6–20)
BUN/CREAT SERPL: 18 (ref 12–20)
CALCIUM SERPL-MCNC: 8.8 MG/DL (ref 8.5–10.1)
CC UR VC: ABNORMAL
CHLORIDE SERPL-SCNC: 102 MMOL/L (ref 97–108)
CO2 SERPL-SCNC: 29 MMOL/L (ref 21–32)
CREAT SERPL-MCNC: 0.4 MG/DL (ref 0.55–1.02)
DIFFERENTIAL METHOD BLD: ABNORMAL
EOSINOPHIL # BLD: 0.1 K/UL (ref 0–0.4)
EOSINOPHIL NFR BLD: 2 % (ref 0–7)
ERYTHROCYTE [DISTWIDTH] IN BLOOD BY AUTOMATED COUNT: 12.8 % (ref 11.5–14.5)
GLUCOSE SERPL-MCNC: 94 MG/DL (ref 65–100)
HCT VFR BLD AUTO: 30.7 % (ref 35–47)
HGB BLD-MCNC: 9.8 G/DL (ref 11.5–16)
IMM GRANULOCYTES # BLD: 0 K/UL (ref 0–0.04)
IMM GRANULOCYTES NFR BLD AUTO: 0 % (ref 0–0.5)
LYMPHOCYTES # BLD: 1.5 K/UL (ref 0.8–3.5)
LYMPHOCYTES NFR BLD: 32 % (ref 12–49)
MCH RBC QN AUTO: 31.6 PG (ref 26–34)
MCHC RBC AUTO-ENTMCNC: 31.9 G/DL (ref 30–36.5)
MCV RBC AUTO: 99 FL (ref 80–99)
MONOCYTES # BLD: 0.3 K/UL (ref 0–1)
MONOCYTES NFR BLD: 7 % (ref 5–13)
NEUTS SEG # BLD: 2.7 K/UL (ref 1.8–8)
NEUTS SEG NFR BLD: 58 % (ref 32–75)
NRBC # BLD: 0 K/UL (ref 0–0.01)
NRBC BLD-RTO: 0 PER 100 WBC
PLATELET # BLD AUTO: 246 K/UL (ref 150–400)
PMV BLD AUTO: 9.5 FL (ref 8.9–12.9)
POTASSIUM SERPL-SCNC: 3.6 MMOL/L (ref 3.5–5.1)
RBC # BLD AUTO: 3.1 M/UL (ref 3.8–5.2)
SERVICE CMNT-IMP: ABNORMAL
SODIUM SERPL-SCNC: 137 MMOL/L (ref 136–145)
WBC # BLD AUTO: 4.6 K/UL (ref 3.6–11)

## 2018-07-09 PROCEDURE — 74011250636 HC RX REV CODE- 250/636

## 2018-07-09 PROCEDURE — 88312 SPECIAL STAINS GROUP 1: CPT | Performed by: INTERNAL MEDICINE

## 2018-07-09 PROCEDURE — 74011250637 HC RX REV CODE- 250/637: Performed by: SPECIALIST

## 2018-07-09 PROCEDURE — 97161 PT EVAL LOW COMPLEX 20 MIN: CPT | Performed by: PHYSICAL THERAPIST

## 2018-07-09 PROCEDURE — 74011000258 HC RX REV CODE- 258: Performed by: INTERNAL MEDICINE

## 2018-07-09 PROCEDURE — 74011250636 HC RX REV CODE- 250/636: Performed by: INTERNAL MEDICINE

## 2018-07-09 PROCEDURE — 97165 OT EVAL LOW COMPLEX 30 MIN: CPT

## 2018-07-09 PROCEDURE — 88305 TISSUE EXAM BY PATHOLOGIST: CPT | Performed by: SPECIALIST

## 2018-07-09 PROCEDURE — 74011250636 HC RX REV CODE- 250/636: Performed by: SPECIALIST

## 2018-07-09 PROCEDURE — 80048 BASIC METABOLIC PNL TOTAL CA: CPT | Performed by: INTERNAL MEDICINE

## 2018-07-09 PROCEDURE — G8987 SELF CARE CURRENT STATUS: HCPCS

## 2018-07-09 PROCEDURE — 65660000000 HC RM CCU STEPDOWN

## 2018-07-09 PROCEDURE — 74011000250 HC RX REV CODE- 250

## 2018-07-09 PROCEDURE — 96372 THER/PROPH/DIAG INJ SC/IM: CPT

## 2018-07-09 PROCEDURE — 97530 THERAPEUTIC ACTIVITIES: CPT | Performed by: PHYSICAL THERAPIST

## 2018-07-09 PROCEDURE — 77030018712 HC DEV BLLN INFL BSC -B: Performed by: SPECIALIST

## 2018-07-09 PROCEDURE — 96361 HYDRATE IV INFUSION ADD-ON: CPT

## 2018-07-09 PROCEDURE — 76060000031 HC ANESTHESIA FIRST 0.5 HR: Performed by: SPECIALIST

## 2018-07-09 PROCEDURE — 74011000250 HC RX REV CODE- 250: Performed by: EMERGENCY MEDICINE

## 2018-07-09 PROCEDURE — 0D758ZZ DILATION OF ESOPHAGUS, VIA NATURAL OR ARTIFICIAL OPENING ENDOSCOPIC: ICD-10-PCS | Performed by: SPECIALIST

## 2018-07-09 PROCEDURE — 0DB28ZX EXCISION OF MIDDLE ESOPHAGUS, VIA NATURAL OR ARTIFICIAL OPENING ENDOSCOPIC, DIAGNOSTIC: ICD-10-PCS | Performed by: SPECIALIST

## 2018-07-09 PROCEDURE — 36415 COLL VENOUS BLD VENIPUNCTURE: CPT | Performed by: INTERNAL MEDICINE

## 2018-07-09 PROCEDURE — G8988 SELF CARE GOAL STATUS: HCPCS

## 2018-07-09 PROCEDURE — 99218 HC RM OBSERVATION: CPT

## 2018-07-09 PROCEDURE — G8979 MOBILITY GOAL STATUS: HCPCS | Performed by: PHYSICAL THERAPIST

## 2018-07-09 PROCEDURE — 88112 CYTOPATH CELL ENHANCE TECH: CPT | Performed by: INTERNAL MEDICINE

## 2018-07-09 PROCEDURE — 77030019988 HC FCPS ENDOSC DISP BSC -B: Performed by: SPECIALIST

## 2018-07-09 PROCEDURE — 97535 SELF CARE MNGMENT TRAINING: CPT

## 2018-07-09 PROCEDURE — 76040000019: Performed by: SPECIALIST

## 2018-07-09 PROCEDURE — 74011250637 HC RX REV CODE- 250/637: Performed by: INTERNAL MEDICINE

## 2018-07-09 PROCEDURE — 85025 COMPLETE CBC W/AUTO DIFF WBC: CPT | Performed by: INTERNAL MEDICINE

## 2018-07-09 PROCEDURE — 92610 EVALUATE SWALLOWING FUNCTION: CPT

## 2018-07-09 PROCEDURE — G8978 MOBILITY CURRENT STATUS: HCPCS | Performed by: PHYSICAL THERAPIST

## 2018-07-09 PROCEDURE — 96366 THER/PROPH/DIAG IV INF ADDON: CPT

## 2018-07-09 PROCEDURE — 0DD28ZX EXTRACTION OF MIDDLE ESOPHAGUS, VIA NATURAL OR ARTIFICIAL OPENING ENDOSCOPIC, DIAGNOSTIC: ICD-10-PCS | Performed by: SPECIALIST

## 2018-07-09 PROCEDURE — 77030038269 HC DRN EXT URIN PURWCK BARD -A

## 2018-07-09 PROCEDURE — 96375 TX/PRO/DX INJ NEW DRUG ADDON: CPT

## 2018-07-09 RX ORDER — LIDOCAINE HYDROCHLORIDE 20 MG/ML
INJECTION, SOLUTION EPIDURAL; INFILTRATION; INTRACAUDAL; PERINEURAL AS NEEDED
Status: DISCONTINUED | OUTPATIENT
Start: 2018-07-09 | End: 2018-07-09 | Stop reason: HOSPADM

## 2018-07-09 RX ORDER — FENTANYL CITRATE 50 UG/ML
50 INJECTION, SOLUTION INTRAMUSCULAR; INTRAVENOUS
Status: DISCONTINUED | OUTPATIENT
Start: 2018-07-09 | End: 2018-07-09 | Stop reason: HOSPADM

## 2018-07-09 RX ORDER — LEVOFLOXACIN 5 MG/ML
750 INJECTION, SOLUTION INTRAVENOUS EVERY 24 HOURS
Status: DISCONTINUED | OUTPATIENT
Start: 2018-07-09 | End: 2018-07-11 | Stop reason: HOSPADM

## 2018-07-09 RX ORDER — DEXTROMETHORPHAN/PSEUDOEPHED 2.5-7.5/.8
1.2 DROPS ORAL
Status: DISCONTINUED | OUTPATIENT
Start: 2018-07-09 | End: 2018-07-09 | Stop reason: HOSPADM

## 2018-07-09 RX ORDER — NALOXONE HYDROCHLORIDE 0.4 MG/ML
0.4 INJECTION, SOLUTION INTRAMUSCULAR; INTRAVENOUS; SUBCUTANEOUS
Status: DISCONTINUED | OUTPATIENT
Start: 2018-07-09 | End: 2018-07-09 | Stop reason: HOSPADM

## 2018-07-09 RX ORDER — SODIUM CHLORIDE 0.9 % (FLUSH) 0.9 %
5-10 SYRINGE (ML) INJECTION AS NEEDED
Status: ACTIVE | OUTPATIENT
Start: 2018-07-09 | End: 2018-07-09

## 2018-07-09 RX ORDER — SODIUM CHLORIDE 0.9 % (FLUSH) 0.9 %
5-10 SYRINGE (ML) INJECTION EVERY 8 HOURS
Status: COMPLETED | OUTPATIENT
Start: 2018-07-09 | End: 2018-07-09

## 2018-07-09 RX ORDER — PROPOFOL 10 MG/ML
INJECTION, EMULSION INTRAVENOUS AS NEEDED
Status: DISCONTINUED | OUTPATIENT
Start: 2018-07-09 | End: 2018-07-09 | Stop reason: HOSPADM

## 2018-07-09 RX ORDER — ENOXAPARIN SODIUM 100 MG/ML
40 INJECTION SUBCUTANEOUS EVERY 24 HOURS
Status: DISCONTINUED | OUTPATIENT
Start: 2018-07-09 | End: 2018-07-11 | Stop reason: HOSPADM

## 2018-07-09 RX ORDER — LEVOFLOXACIN 5 MG/ML
750 INJECTION, SOLUTION INTRAVENOUS EVERY 24 HOURS
Status: DISCONTINUED | OUTPATIENT
Start: 2018-07-09 | End: 2018-07-09

## 2018-07-09 RX ORDER — MIDAZOLAM HYDROCHLORIDE 1 MG/ML
.25-5 INJECTION, SOLUTION INTRAMUSCULAR; INTRAVENOUS
Status: DISCONTINUED | OUTPATIENT
Start: 2018-07-09 | End: 2018-07-09 | Stop reason: HOSPADM

## 2018-07-09 RX ORDER — SODIUM CHLORIDE 9 MG/ML
50 INJECTION, SOLUTION INTRAVENOUS CONTINUOUS
Status: DISPENSED | OUTPATIENT
Start: 2018-07-09 | End: 2018-07-09

## 2018-07-09 RX ORDER — FLUMAZENIL 0.1 MG/ML
0.2 INJECTION INTRAVENOUS
Status: DISCONTINUED | OUTPATIENT
Start: 2018-07-09 | End: 2018-07-09 | Stop reason: HOSPADM

## 2018-07-09 RX ORDER — NYSTATIN 100000 [USP'U]/ML
500000 SUSPENSION ORAL 4 TIMES DAILY
Status: DISCONTINUED | OUTPATIENT
Start: 2018-07-09 | End: 2018-07-11 | Stop reason: HOSPADM

## 2018-07-09 RX ORDER — GLYCOPYRROLATE 0.2 MG/ML
INJECTION INTRAMUSCULAR; INTRAVENOUS AS NEEDED
Status: DISCONTINUED | OUTPATIENT
Start: 2018-07-09 | End: 2018-07-09 | Stop reason: HOSPADM

## 2018-07-09 RX ADMIN — NYSTATIN 500000 UNITS: 100000 SUSPENSION ORAL at 21:47

## 2018-07-09 RX ADMIN — NYSTATIN 500000 UNITS: 100000 SUSPENSION ORAL at 18:35

## 2018-07-09 RX ADMIN — CEFTRIAXONE 1 G: 1 INJECTION, POWDER, FOR SOLUTION INTRAMUSCULAR; INTRAVENOUS at 09:09

## 2018-07-09 RX ADMIN — Medication 5 ML: at 15:00

## 2018-07-09 RX ADMIN — LIDOCAINE HYDROCHLORIDE 100 MG: 20 INJECTION, SOLUTION EPIDURAL; INFILTRATION; INTRACAUDAL; PERINEURAL at 14:08

## 2018-07-09 RX ADMIN — METOPROLOL TARTRATE 25 MG: 25 TABLET ORAL at 08:51

## 2018-07-09 RX ADMIN — Medication 10 ML: at 21:48

## 2018-07-09 RX ADMIN — Medication 10 ML: at 05:58

## 2018-07-09 RX ADMIN — MORPHINE SULFATE 2 MG: 2 INJECTION, SOLUTION INTRAMUSCULAR; INTRAVENOUS at 10:21

## 2018-07-09 RX ADMIN — CLONAZEPAM 0.5 MG: 0.5 TABLET, ORALLY DISINTEGRATING ORAL at 08:51

## 2018-07-09 RX ADMIN — METOPROLOL TARTRATE 25 MG: 25 TABLET ORAL at 18:35

## 2018-07-09 RX ADMIN — NEOMYCIN SULFATE, POLYMYXIN B SULFATE, AND DEXAMETHASONE: 3.5; 10000; 1 OINTMENT OPHTHALMIC at 10:21

## 2018-07-09 RX ADMIN — MORPHINE SULFATE 2 MG: 2 INJECTION, SOLUTION INTRAMUSCULAR; INTRAVENOUS at 05:17

## 2018-07-09 RX ADMIN — ENOXAPARIN SODIUM 40 MG: 100 INJECTION SUBCUTANEOUS at 15:19

## 2018-07-09 RX ADMIN — CLONAZEPAM 0.5 MG: 0.5 TABLET, ORALLY DISINTEGRATING ORAL at 21:48

## 2018-07-09 RX ADMIN — LEVOFLOXACIN 750 MG: 5 INJECTION, SOLUTION INTRAVENOUS at 15:19

## 2018-07-09 RX ADMIN — MORPHINE SULFATE 2 MG: 2 INJECTION, SOLUTION INTRAMUSCULAR; INTRAVENOUS at 20:32

## 2018-07-09 RX ADMIN — ACETAMINOPHEN 500 MG: 500 TABLET, FILM COATED ORAL at 00:15

## 2018-07-09 RX ADMIN — NEOMYCIN SULFATE, POLYMYXIN B SULFATE, AND DEXAMETHASONE: 3.5; 10000; 1 OINTMENT OPHTHALMIC at 21:48

## 2018-07-09 RX ADMIN — ATORVASTATIN CALCIUM 20 MG: 20 TABLET, FILM COATED ORAL at 21:47

## 2018-07-09 RX ADMIN — Medication 5 ML: at 14:00

## 2018-07-09 RX ADMIN — VENLAFAXINE HYDROCHLORIDE 150 MG: 150 CAPSULE, EXTENDED RELEASE ORAL at 08:51

## 2018-07-09 RX ADMIN — HYDROCODONE BITARTRATE AND ACETAMINOPHEN 1 TABLET: 5; 325 TABLET ORAL at 21:47

## 2018-07-09 RX ADMIN — HYDROCODONE BITARTRATE AND ACETAMINOPHEN 1 TABLET: 5; 325 TABLET ORAL at 05:58

## 2018-07-09 RX ADMIN — HEPARIN SODIUM 5000 UNITS: 5000 INJECTION, SOLUTION INTRAVENOUS; SUBCUTANEOUS at 05:13

## 2018-07-09 RX ADMIN — FOLIC ACID 1 MG: 1 TABLET ORAL at 08:51

## 2018-07-09 RX ADMIN — SODIUM CHLORIDE 50 ML/HR: 900 INJECTION, SOLUTION INTRAVENOUS at 14:07

## 2018-07-09 RX ADMIN — DOCUSATE SODIUM 100 MG: 100 CAPSULE, LIQUID FILLED ORAL at 18:35

## 2018-07-09 RX ADMIN — THERA TABS 1 TABLET: TAB at 08:51

## 2018-07-09 RX ADMIN — NEOMYCIN SULFATE, POLYMYXIN B SULFATE, AND DEXAMETHASONE: 3.5; 10000; 1 OINTMENT OPHTHALMIC at 13:00

## 2018-07-09 RX ADMIN — DOCUSATE SODIUM 100 MG: 100 CAPSULE, LIQUID FILLED ORAL at 08:51

## 2018-07-09 RX ADMIN — LEVOTHYROXINE SODIUM 25 MCG: 25 TABLET ORAL at 05:58

## 2018-07-09 RX ADMIN — PANTOPRAZOLE SODIUM 40 MG: 40 TABLET, DELAYED RELEASE ORAL at 09:09

## 2018-07-09 RX ADMIN — HYDROCODONE BITARTRATE AND ACETAMINOPHEN 1 TABLET: 5; 325 TABLET ORAL at 12:05

## 2018-07-09 RX ADMIN — GLYCOPYRROLATE 0.1 MG: 0.2 INJECTION INTRAMUSCULAR; INTRAVENOUS at 14:08

## 2018-07-09 RX ADMIN — PROPOFOL 40 MG: 10 INJECTION, EMULSION INTRAVENOUS at 14:08

## 2018-07-09 RX ADMIN — NEOMYCIN SULFATE, POLYMYXIN B SULFATE, AND DEXAMETHASONE: 3.5; 10000; 1 OINTMENT OPHTHALMIC at 18:36

## 2018-07-09 RX ADMIN — PROPOFOL 20 MG: 10 INJECTION, EMULSION INTRAVENOUS at 14:15

## 2018-07-09 RX ADMIN — TRAZODONE HYDROCHLORIDE 150 MG: 100 TABLET ORAL at 21:47

## 2018-07-09 RX ADMIN — CLONAZEPAM 0.5 MG: 0.5 TABLET, ORALLY DISINTEGRATING ORAL at 16:11

## 2018-07-09 RX ADMIN — HYDROCODONE BITARTRATE AND ACETAMINOPHEN 1 TABLET: 5; 325 TABLET ORAL at 00:15

## 2018-07-09 RX ADMIN — ACETAMINOPHEN 500 MG: 500 TABLET, FILM COATED ORAL at 21:48

## 2018-07-09 NOTE — ANESTHESIA POSTPROCEDURE EVALUATION
Post-Anesthesia Evaluation and Assessment    Patient: Magui Parr MRN: [id number]  SSN: [id number]    YOB: 1940  Age: 78 y.o. Sex: female       Cardiovascular Function/Vital Signs  Visit Vitals    /88    Pulse 95    Temp 36.8 °C (98.2 °F)    Resp 18    Ht 5' 2\" (1.575 m)    Wt 73.9 kg (163 lb)    SpO2 (!) 77%    Breastfeeding No    BMI 29.81 kg/m2       Patient is status post total IV anesthesia, general anesthesia for Procedure(s):  ESOPHAGOGASTRODUODENOSCOPY (EGD)  ESOPHAGEAL DILATION  ESOPHAGOGASTRODUODENAL (EGD) BIOPSY  ENDOSCOPIC BRUSHING. Nausea/Vomiting: None    Postoperative hydration reviewed and adequate. Pain:  Pain Scale 1: Numeric (0 - 10) (07/09/18 1438)  Pain Intensity 1: 0 (07/09/18 1438)   Managed    Neurological Status:   Neuro  Neurologic State: Alert (07/09/18 1100)  Orientation Level: Oriented X4 (07/09/18 0930)  Cognition: Follows commands; Appropriate for age attention/concentration (07/09/18 0930)  Speech: Clear (07/08/18 1935)  Assessment L Pupil: Deferred (07/08/18 1935)  LUE Motor Response: Purposeful;Weak (07/08/18 1935)  LLE Motor Response: Purposeful (07/08/18 1935)  RUE Motor Response: Purposeful;Weak (07/08/18 1935)  RLE Motor Response: Purposeful (07/08/18 1935)   At baseline    Mental Status and Level of Consciousness: Arousable    Pulmonary Status:   O2 Device: Room air (07/09/18 1441)   Adequate oxygenation and airway patent    Complications related to anesthesia: None    Post-anesthesia assessment completed.  No concerns    Signed By: Livia Malone MD     July 9, 2018

## 2018-07-09 NOTE — PROCEDURES
Esophagogastroduodenoscopy Procedure Note      Elaine Mary  1940  [id number]    Indication:  Esophageal dysphagia with abnormal esophageal motility on barium swallow     Endoscopist: Ba Mendoza MD    Referring Provider:  Hamilton Damon MD    Sedation:  MAC anesthesia Propofol    Procedure Details:  After infomed consent was obtained for the procedure, with all risks and benefits of procedure explained the patient was taken to the endoscopy suite and placed in the left lateral decubitus position. Following sequential administration of sedation as per above, the endoscope was inserted into the mouth and advanced under direct vision to second portion of the duodenum. A careful inspection was made as the gastroscope was withdrawn, including a retroflexed view of the proximal stomach; findings and interventions are described below. Findings: The esophagus had increased contractions with an increase in amount of saliva with some exudate (whitish) seen s/p brushing. There was resistance to scope traversing the EG junction but no stricture noted and mucosa was normal (s/p random bx of mid esophagus to r/o EoE)  S/P Empiric dilation with 54 FR Savary over wire followed by 57 FR Savary over wire with resistance to passage. Normal stomach mucosa. Normal EG junction seen on retroflexion. There was normal duodenal mucosa from bulb to second portion. Therapies:   esophageal dilation with savary sizes 54 , 57 FR  biopsy of esophagus  brushing for fungus    Specimen:  Specimens were collected as described and send to the laboratory. Complications:   None were encountered during the procedure. EBL:  < 10 ml.           Recommendations:   -f/u path  -pureed diet  -suspicious for possible achalasia and will need esophageal manometry    Ba Mendoza MD  7/9/2018  2:21 PM

## 2018-07-09 NOTE — PROGRESS NOTES
Problem: Self Care Deficits Care Plan (Adult)  Goal: *Acute Goals and Plan of Care (Insert Text)  Occupational Therapy Goals  Initiated 7/9/2018  1. Patient will perform self-feeding with minimal assistance/contact guard assist within 7 day(s). 2.  Patient will perform grooming seated on EOB with moderate assistance  within 7 day(s). 3.  Patient will perform bathing seated on EOB with moderate assistance  within 7 day(s). 4.  Patient will perform toilet transfers with minimal assistance/contact guard assist within 7 day(s). 5.  Patient will perform all aspects of toileting with moderate assistance  within 7 day(s). 6.  Patient will participate in upper extremity therapeutic exercise/activities with moderate assistance  for 5 minutes within 7 day(s). 7.  Patient will utilize energy conservation techniques during functional activities with verbal cues within 7 day(s). Occupational Therapy EVALUATION  Patient: Humaira Stauffer (78 y.o. female)  Date: 7/9/2018  Primary Diagnosis: Sepsis ([address]  Sepsis ([address]  Dysphagia  Sepsis ([address]  Procedure(s) (LRB):  ESOPHAGOGASTRODUODENOSCOPY (EGD) (N/A)  ESOPHAGEAL DILATION (N/A)  ESOPHAGOGASTRODUODENAL (EGD) BIOPSY (N/A)  ENDOSCOPIC BRUSHING (N/A) Day of Surgery   Precautions:        ASSESSMENT :  Based on the objective data described below, the patient presents with generalized weakness, decreased endurance, strength, functional mobility, and ADLs. Pt was living at [facility] and states that she has not walked in about 2 weeks due to feeling weak. Pt reports that she was total care for her ADLs, and did not get out of bed. She was cleared to be seen for therapy and all VSS. Pt had corneal transplants approx 9 days ago and pt states that she does not have any vision but appears to be able to see somewhat. Pt was supine in bed and was SBA for bed mobility, and she was able to stand with CGA of 1.   Pt was not willing to take steps forward but did side step to the [facility], with use of RW and pt was CGA for side stepping . She was SBA for sit to supine and left in bed due to pt had EGD later today. Pt at this time is very self limiting and states that she is not able to stand and take steps but did well with this. Recommend that pt have further therapy at discharge at rehab at [facility]. Pt does have RA and deformities in B hands and weak pincer  on B hands but states that she has been able to feed herself in the past.      Patient will benefit from skilled intervention to address the above impairments. Patients rehabilitation potential is considered to be Fair  Factors which may influence rehabilitation potential include:   [x]             None noted  []             Mental ability/status  []             Medical condition  []             Home/family situation and support systems  []             Safety awareness  []             Pain tolerance/management  []             Other:      PLAN :  Recommendations and Planned Interventions:  [x]               Self Care Training                  []        Therapeutic Activities  [x]               Functional Mobility Training    []        Cognitive Retraining  [x]               Therapeutic Exercises           [x]        Endurance Activities  []               Balance Training                   []        Neuromuscular Re-Education  []               Visual/Perceptual Training     [x]   Home Safety Training  [x]               Patient Education                 [x]        Family Training/Education  []               Other (comment):    Frequency/Duration: Patient will be followed by occupational therapy 3 times a week to address goals. Discharge Recommendations: [facility]  Further Equipment Recommendations for Discharge: tbd     SUBJECTIVE:   Patient stated I cant stand because of those nodules on my feet.     OBJECTIVE DATA SUMMARY:   HISTORY:   Past Medical History:   Diagnosis Date    Baker's cyst of knee     Depression     Hypertension     RA [address]rheumatoid [address]) [address] [address] [facility]      Past Surgical History:   Procedure Laterality Date    COLONOSCOPY N/A 2/28/2017    COLONOSCOPY performed by Governor Kym MD at [facility] ENDOSCOPY    HX HERNIA REPAIR      HX UROLOGICAL      \"bladder tack\"       Prior Level of Function/Environment/Context: pt resides in SNF and reports that she is total care for ADLs and has not been out of bed for approx 2 weeks. Expanded or extensive additional review of patient history:     Home Situation  Home Environment: Long term care  One/Two Story Residence: One story  Living Alone: Yes  Support Systems: Long term acute care  Patient Expects to be Discharged to[de-identified] Skilled nursing facility  Current DME Used/Available at Home: Vonna Papa, rolling  Tub or Shower Type: Shower    Hand dominance: Right    EXAMINATION OF PERFORMANCE DEFICITS:  Cognitive/Behavioral Status:  Neurologic State: Alert  Orientation Level: Oriented X4  Cognition: Follows commands  Perception:  (pt reports that her vision is poor)  Perseveration: No perseveration noted  Safety/Judgement: Awareness of environment; Fall prevention    Skin: in good health     Edema: none noted    Hearing:   Auditory  Auditory Impairment: None    Vision/Perceptual:                 impaired                    Range of Motion:    AROM: Generally decreased, functional (except B hands are grossly impaired 2/2 RA)                         Strength:    Strength: Generally decreased, functional                Coordination:  Coordination: Grossly decreased, non-functional (in B hands)  Fine Motor Skills-Upper: Left Impaired;Right Impaired    Gross Motor Skills-Upper: Left Impaired;Right Impaired    Tone & Sensation:    Tone: Normal                         Balance:  Sitting: Intact  Standing: Impaired  Standing - Static: Good;Constant support  Standing - Dynamic : Fair (using RW to side step to head of bed)    Functional Mobility and Transfers for ADLs:  Bed Mobility:  Supine to Sit: Stand-by assistance; Additional time  Sit to Supine: Stand-by assistance; Additional time  Scooting: Stand-by assistance; Additional time    Transfers:  Sit to Stand: Contact guard assistance  Stand to Sit: Contact guard assistance  Bed to Chair:  (pt refused to sit up)    ADL Assessment:  Feeding: Maximum assistance    Oral Facial Hygiene/Grooming: Maximum assistance    Bathing: Maximum assistance    Upper Body Dressing: Maximum assistance    Lower Body Dressing: Maximum assistance    Toileting: Maximum assistance           Cognitive Retraining  Safety/Judgement: Awareness of environment; Fall prevention       Functional Measure:  Barthel Index:    Bathin  Bladder: 0  Bowels: 0  Groomin  Dressin  Feedin  Mobility: 0  Stairs: 0  Toilet Use: 0  Transfer (Bed to Chair and Back): 0  Total: 0       Barthel and G-code impairment scale:  Percentage of impairment CH  0% CI  1-19% CJ  20-39% CK  40-59% CL  60-79% CM  80-99% CN  100%   Barthel Score 0-100 100 99-80 79-60 59-40 20-39 1-19   0   Barthel Score 0-20 20 17-19 13-16 9-12 5-8 1-4 0      The Barthel ADL Index: Guidelines  1. The index should be used as a record of what a patient does, not as a record of what a patient could do. 2. The main aim is to establish degree of independence from any help, physical or verbal, however minor and for whatever reason. 3. The need for supervision renders the patient not independent. 4. A patient's performance should be established using the best available evidence. Asking the patient, friends/relatives and nurses are the usual sources, but direct observation and common sense are also important. However direct testing is not needed. 5. Usually the patient's performance over the preceding 24-48 hours is important, but occasionally longer periods will be relevant. 6. Middle categories imply that the patient supplies over 50 per cent of the effort. 7. Use of aids to be independent is allowed.     Pratibha Hash., Barthel DBlazeW. (1965). Functional evaluation: the Barthel Index. [facility] (14)2. Sandria Cogan [place], J.J.M.F, Pily Pritchard, Aundrea Cary, Har[place], [address] (1999). Measuring the change indisability after inpatient rehabilitation; comparison of the responsiveness of the Barthel Index and Functional [place] Measure. Journal of Neurology, Neurosurgery, and Psychiatry, 66(4), 816-536. AWAIS Dill, BRYCE Celis, & Markos Hernandez M.A. (2004.) Assessment of post-stroke quality of life in cost-effectiveness studies: The usefulness of the Barthel Index and the EuroQoL-5D. Quality of Life Research, 13, 051-36         G codes: In compliance with CMSs Claims Based Outcome Reporting, the following G-code set was chosen for this patient based on their primary functional limitation being treated: The outcome measure chosen to determine the severity of the functional limitation was the Barthel with a score of 0/100 which was correlated with the impairment scale. ? Self Care:     - CURRENT STATUS: CN - 100% impaired, limited or restricted    - GOAL STATUS: CM - 80%-99% impaired, limited or restricted    - D/C STATUS:  ---------------To be determined---------------     Occupational Therapy Evaluation Charge Determination   History Examination Decision-Making   MEDIUM Complexity : Expanded review of history including physical, cognitive and psychosocial  history  MEDIUM Complexity : 3-5 performance deficits relating to physical, cognitive , or psychosocial skils that result in activity limitations and / or participation restrictions MEDIUM Complexity : Patient may present with comorbidities that affect occupational performnce.  Miniml to moderate modification of tasks or assistance (eg, physical or verbal ) with assesment(s) is necessary to enable patient to complete evaluation       Based on the above components, the patient evaluation is determined to be of the following complexity level: MEDIUM  Pain:  Pain Scale 1: Numeric (0 - 10)  Pain Intensity 1: 0  Pain Location 1: Eye;Knee  Pain Orientation 1: Left  Pain Description 1: Constant; Aching  Pain Intervention(s) 1: Medication (see MAR)  Activity Tolerance:   vss/fair  Please refer to the flowsheet for vital signs taken during this treatment. After treatment:   [] Patient left in no apparent distress sitting up in chair  [x] Patient left in no apparent distress in bed  [x] Call bell left within reach  [x] Nursing notified  [] Caregiver present  [x] Bed alarm activated    COMMUNICATION/EDUCATION:   The patients plan of care was discussed with: Physical Therapist and Registered Nurse. [x] Home safety education was provided and the patient/caregiver indicated understanding. [] Patient/family have participated as able in goal setting and plan of care. [] Patient/family agree to work toward stated goals and plan of care. [x] Patient understands intent and goals of therapy, but is neutral about his/her participation. [] Patient is unable to participate in goal setting and plan of care. This patients plan of care is appropriate for delegation to [facility].     Thank you for this referral.  Luisa Ortiz, OT  Time Calculation: 22 mins

## 2018-07-09 NOTE — PROGRESS NOTES
1530  Report rec'd from [facility] given to Tamiko Ferrell

## 2018-07-09 NOTE — PHYSICIAN ADVISORY
Letter of Status Determination:   Recommend hospitalization status upgraded from   INPATIENT  to INPATIENT  Status     Pt Name:  Humaira Stauffer   MR#   [address] [facility] # [id number] /  [id number]   [facility]#  [id number]   [address] [place] [address]  2274/01  @ [facility]   Hospitalization date  7/7/2018  7:06 AM   Current Attending Physician  Angeles Rogers, *   Principal diagnosis  Sepsis [facility])      Clinicals  78 y.o. y.o  female hospitalized with above diagnosis   The pt is now diagnosed with UTI with her urine cx growing Moganella sp. She is immunocompromised from her chronic RA Rx. Due to appropriate and necessary medical care, this pt's hospitalization has now exceeded two midnights . [facility] criteria   Does  NOT apply    STATUS DETERMINATION  This patient is at above high risk of deterioration based on documented presenting clinical data, comorbid conditions, high risk of adverse events and current acute care course. Ms. Humaira Stauffer now meets Inpatient Admission status criteria in accordance with CMS regulation Section 43 .3. Specifically, due to medical necessity the patient's stay now exceeds Two Midnights. It is our recommendation that this patient's hospitalization status should be upgraded from  INPATIENT to INPATIENT status. The final decision of the patient's hospitalization status depends on the attending physician's judgment            Additional comments     Payor: Ashlee Salmon / Plan: [address] / Product Type: Medicare /         Rosalind Espinoza MD MPH FACP     Physician [address]   President Medical Staff, [facility]    Cell  [phone]        [phone]    .

## 2018-07-09 NOTE — PROGRESS NOTES
Elaine Mary  1940  [id number]    Situation:  Verbal report received from: Franny DIAMOND  Procedure: Procedure(s):  ESOPHAGOGASTRODUODENOSCOPY (EGD)  ESOPHAGEAL DILATION  ESOPHAGOGASTRODUODENAL (EGD) BIOPSY    Background:    Preoperative diagnosis: Dysphagia  Postoperative diagnosis: Dysphagia    :  Dr. Tamara Edward  Assistant(s): Endoscopy Technician-1: Carmela Workman  Endoscopy RN-1: Jenny Gordon RN    Specimens:   ID Type Source Tests Collected by Time Destination   1 : Mid Esophagus BX Preservative   Nadeen Martinez MD 7/9/2018 1416 Pathology   1 : esophageal brushing Fresh   Nadeen Martinez MD 7/9/2018 1417 Cytology     H. Pylori  no    Assessment:  Intra-procedure medications     Anesthesia gave intra-procedure sedation and medications, see anesthesia flow sheet yes    Intravenous fluids: NS@ KVO     Vital signs stable       Abdominal assessment: round and soft       Recommendation:  Discharge patient per MD order  .   Return to floor  Family or Friend   Brother here and spoke  Permission to share finding with family or friend yes

## 2018-07-09 NOTE — PROGRESS NOTES
Hospitalist Progress Note NAME: Pedrito Roque :  1940 MRN:  [id number] Assessment / Plan: 
Sepsis possibly due to UTI, from UA  POA Now on Levaquin based on culture. morgenela grew of urine cx. Lactic acid Nl, BP is Within Acceptable Range. WBC Within Acceptable Range. Afebrile. will wait for blood and urine cultures. Will continue to monitor vitals. Dysphagia , unspecified yet. s/p barium swallow Report as below Markedly tertiary wave activity involving the esophagus with significant 
to-and-fro action of contrast within the esophagus and relatively poor emptying. GI Inputs and recommendations Appreciated. S/p ESOPHAGOGASTRODUODENOSCOPY (EGD). ESOPHAGEAL DILATION 
ESOPHAGOGASTRODUODENAL (EGD) BIOPSY and ENDOSCOPIC BRUSHING. Hypertension: continue the metoprolol RA (rheumatoid arthritis) ([address] Humira Q week Anxiety and Depression Continue effexor and clonipine. Wound on the forehead and bruising around the right eye , after a fall from a week ago before hospital admission. Eye drops and wound care. Body mass index is 29.81 kg/(m^2).: 30.0 - 39.9 Obese Code status: Full Prophylaxis: Hep SQ Recommended Disposition: TBD Subjective: Chief Complaint / Reason for Physician Visit Patient is ready for EGD , and discussed about the procedure and the overall plan of care with patient.looks a bit worried. .  Discussed with RN events overnight. Review of Systems: 
Symptom Y/N Comments  Symptom Y/N Comments Fever/Chills n   Chest Pain n   
Poor Appetite    Edema Cough n   Abdominal Pain n   
Sputum    Joint Pain SOB/MADERA n   Pruritis/Rash Nausea/vomit    Tolerating PT/OT Diarrhea    Tolerating Diet Constipation    Other Could NOT obtain due to:   
 
Objective: VITALS:  
Last 24hrs VS reviewed since prior progress note.  Most recent are: 
Patient Vitals for the past 24 hrs: 
 Temp Pulse Resp BP SpO2  
18 0341 97.8 °F (36.6 °C) 71 20 122/54 99 % 07/09/18 0001 97.3 °F (36.3 °C) 72 20 119/55 100 % 07/08/18 1935 98 °F (36.7 °C) 83 19 113/62 99 % 07/08/18 1630 98 °F (36.7 °C) - 20 129/66 98 % 07/08/18 1117 98 °F (36.7 °C) 83 18 128/70 99 % 07/08/18 0849 - - - - 99 % 07/08/18 0847 98 °F (36.7 °C) 87 17 137/71 98 % Intake/Output Summary (Last 24 hours) at 07/09/18 5320 Last data filed at 07/08/18 4873 Gross per 24 hour Intake                0 ml Output              400 ml Net             -400 ml PHYSICAL EXAM: 
General: WD, WN. Alert, cooperative, no acute distress , wound on forehead after the fall healing well, and bruising around the right eye after the fall before the hospital admission. EENT:  EOMI. Anicteric sclerae. MMM Resp:  CTA bilaterally, no wheezing or rales. No accessory muscle use CV:  Regular  rhythm,  No edema GI:  Soft, Non distended, Non tender.  +Bowel sounds Neurologic:  Alert and oriented X 3, normal speech, Psych:   Good insight. Not anxious nor agitated Deformities in both upper and lower extremity , the rheumatoid arthritis seen. Reviewed most current lab test results and cultures  YES Reviewed most current radiology test results   YES Review and summation of old records today    NO Reviewed patient's current orders and MAR    YES 
PMH/ reviewed - no change compared to H&P 
________________________________________________________________________ Care Plan discussed with: 
  Comments Patient Family RN Care Manager Consultant Multidiciplinary team rounds were held today with , nursing, pharmacist and clinical coordinator. Patient's plan of care was discussed; medications were reviewed and discharge planning was addressed. ________________________________________________________________________ Total NON critical care TIME:  45   Minutes Total CRITICAL CARE TIME Spent:   Minutes non procedure based   Comments >50% of visit spent in counseling and coordination of care    
________________________________________________________________________ Angeles Rogers MD  
 
Procedures: see electronic medical records for all procedures/Xrays and details which were not copied into this note but were reviewed prior to creation of Plan. LABS: 
I reviewed today's most current labs and imaging studies. Pertinent labs include: 
Recent Labs  
   07/09/18 0455 07/08/18 0415 07/07/18 
 0759 WBC  4.6  7.8  9.1 HGB  9.8*  9.9*  11.0*  
HCT  30.7*  30.7*  32.3*  
PLT  246  277  302 Recent Labs  
   07/09/18 0455 07/08/18 0415  07/07/18 
 5925 NA  137  136  132* K  3.6  4.1  3.8 CL  102  102  95* CO2  29  30  30 GLU  94  117*  101* BUN  7  4*  13  
CREA  0.40*  0.41*  0.54* CA  8.8  8.8  8.9 ALB   --    --   3.2* TBILI   --    --   0.4 SGOT   --    --   16 ALT   --    --   17 Signed:  Angeles Rogers MD

## 2018-07-09 NOTE — PROGRESS NOTES
TRANSFER - IN REPORT:    [facility] on June Edson Miki being received from PCU room 2274 (unit) for ordered procedure      Report consisted of patients Situation, Background, Assessment and   Recommendations(SBAR). Information from the following report(s) SBAR, ED Summary, Intake/Output, MAR and Recent Results was reviewed with the receiving nurse. Opportunity for questions and clarification was provided. Will give report to primary ENDO nurse upon arrival to unit.

## 2018-07-09 NOTE — PROGRESS NOTES
0715: Report received from LOBO Keita, [address], ED SUMMARY, MAR, RECENT RESULTS were discussed. Abelino Garcia, LOBO    1623: Pt requesting to speak with MD again and wants to make him aware of \"what she's been going through\". Explained to pt that the MD is aware, she insists he come to her bedside. Paged MD.     7766: Spoke with Dr. Neelam Keane in regards to pt concerns. Waiting for EGD to be complete.  Will continue to monitor pt/concerns    1545: Report given to Tyrese Barnard

## 2018-07-09 NOTE — ANESTHESIA PREPROCEDURE EVALUATION
Anesthetic History   No history of anesthetic complications            Review of Systems / Medical History  Patient summary reviewed, nursing notes reviewed and pertinent labs reviewed    Pulmonary          Smoker      Comments: Former smoker   Neuro/Psych         Psychiatric history     Cardiovascular    Hypertension: well controlled              Exercise tolerance: <4 METS     GI/Hepatic/Renal  Within defined limits              Endo/Other        Obesity, arthritis and anemia     Other Findings   Comments: Syncope  Rheumatoid Arthritis           Physical Exam    Airway  Mallampati: II  TM Distance: 4 - 6 cm  Neck ROM: normal range of motion   Mouth opening: Normal     Cardiovascular    Rhythm: regular  Rate: normal         Dental  No notable dental hx       Pulmonary  Breath sounds clear to auscultation               Abdominal  GI exam deferred       Other Findings            Anesthetic Plan    ASA: 3  Anesthesia type: total IV anesthesia and general          Induction: Intravenous  Anesthetic plan and risks discussed with: Patient      Propofol MAC

## 2018-07-09 NOTE — PROGRESS NOTES
Pt was incontinent of urine, incontinent care done and entire linen change completed. Pt transported back to inpatient room per stretcher per Latasha from Transport and pt in stable condition.

## 2018-07-09 NOTE — PROGRESS NOTES
Problem: Dysphagia (Adult)  Goal: *Acute Goals and Plan of Care (Insert Text)  Speech pathology goals  Initiated 7/9/2018  1. Patient will tolerate diet per GI with no overt s/s aspiration within 7 days  Speech LAnguage Pathology bedside swallow evaluation  Patient: Humaira Stauffer (78 y.o. female)  Date: 7/9/2018  Primary Diagnosis: Sepsis ([address]  Sepsis ([facility] [address]  Dysphagia  Procedure(s) (LRB):  ESOPHAGOGASTRODUODENOSCOPY (EGD) (N/A)  ESOPHAGEAL DILATION (N/A) Day of Surgery   Precautions:        ASSESSMENT :  Patient NPO except meds for EGD this afternoon. Patient observed with 2 bites of applesauce with meds crushed and 2 sips of thin liquid, all given by RN. Based on the objective data described below, the patient presents with an intact oropharyngeal swallow. Patient with timely oral prep, timely swallow initiation, functional hyolaryngeal elevation/excursion, and no overt s/s aspiration observed with minimal trials. Extensive belching noted. Patient denied oropharyngeal dysphagia. Patient with known esophageal dysphagia. Esophagram completed 7/6/18 showed, \"The patient swallows contrast without difficulty. There is marked tertiary wave activity with significant to and fro motion of contrast within the esophagus with relatively poor emptying into the stomach. No  obvious mass lesion or area of abnormal constriction identified. Mild mucosal thickening of esophageal folds suggesting possible esophagitis. \" Patient reported that she was \"choking,\" however upon further questioning, patient reported globus sensation at the sternum. Will follow up after EGD as needed. Patient will benefit from skilled intervention to address the above impairments.   Patients rehabilitation potential is considered to be Fair  Factors which may influence rehabilitation potential include:   []            None noted  []            Mental ability/status  [x]            Medical condition  []            Home/family situation and support systems  []            Safety awareness  []            Pain tolerance/management  []            Other:      PLAN :  Recommendations and Planned Interventions:  --Diet per GI  --Will follow for diet tolerance once cleared for resuming PO intake  Frequency/Duration: Patient will be followed by speech-language pathology 1 time a week to address goals. Discharge Recommendations: None     SUBJECTIVE:   Patient stated I need to talk to the doctor.  RN aware. Patient alert, cooperative. Oriented x4. OBJECTIVE:     Past Medical History:   Diagnosis Date    Baker's cyst of knee     Depression     Hypertension     RA (rheumatoid arthritis) ([facility] [address]      Past Surgical History:   Procedure Laterality Date    COLONOSCOPY N/A 2/28/2017    COLONOSCOPY performed [facility] Manuel Cotto [facility]COPY    HX HERNIA REPAIR      HX UROLOGICAL      \"bladder tack\"     Prior Level of Function/Home Situation:   Home Situation  Home Environment: Long term care  One/Two Story Residence: One story  Living Alone: Yes  Support Systems: Long term acute care  Patient Expects to be Discharged to[de-identified] Skilled nursing facility  Current DME Used/Available at Home: [address] [place] prior to admission: puree/thin  Current Diet:  NPO except meds   Cognitive and Communication Status:  Neurologic State: Alert, Appropriate for age  Orientation Level: Oriented X4  Cognition: Follows commands, Appropriate for age attention/concentration  Perception: Appears intact  Perseveration: No perseveration noted  Safety/Judgement: Awareness of environment     P.O. Trials:  Patient Position: upright in bed  Vocal quality prior to P.O.: No impairment  Consistency Presented: Thin liquid;Puree (2 sips thin, 2 bites applesauce with meds crushed)  How Presented: Other (comment); Spoon;Straw (RN-fed)     Bolus Acceptance: No impairment  Bolus Formation/Control: No impairment     Propulsion: No impairment  Oral Residue: None  Initiation of Swallow: No impairment  Laryngeal Elevation: Functional  Aspiration Signs/Symptoms: None  Pharyngeal Phase Characteristics: No impairment, issues, or problems              Oral Phase Severity: No impairment  Pharyngeal Phase Severity : No impairment    NOMS:   The NOMS functional outcome measure was used to quantify this patient's level of swallowing impairment. Based on the NOMS, the patient was determined to be at level 5 for swallow function     G Codes: In compliance with CMSs Claims Based Outcome Reporting, the following G-code set was chosen for this patient based the use of the NOMS functional outcome to quantify this patient's level of swallowing impairment. Using the NOMS, the patient was determined to be at level 5 for swallow function which correlates with the CJ= 20-39% level of severity. Based on the objective assessment provided within this note, the current, goal, and discharge g-codes are as follows:    Swallow  Swallowing:   Swallow Current Status CJ= 20-39%   Swallow Goal Status CJ= 20-39%      NOMS Swallowing Levels:  Level 1 (CN): NPO  Level 2 (CM): NPO but takes consistency in therapy  Level 3 (CL): Takes less than 50% of nutrition p.o. and continues with nonoral feedings; and/or safe with mod cues; and/or max diet restriction  Level 4 (CK): Safe swallow but needs mod cues; and/or mod diet restriction; and/or still requires some nonoral feeding/supplements  Level 5 (CJ): Safe swallow with min diet restriction; and/or needs min cues  Level 6 (CI): Independent with p.o.; rare cues; usually self cues; may need to avoid some foods or needs extra time  Level 7 ([address] [place] [address]): Independent for all p.o.  DOUGLAS. (2003). National Outcomes Measurement System (NOMS): Adult Speech-Language Pathology User's Guide.        Pain:  Pain Scale 1: Numeric (0 - 10)  Pain Intensity 1: 2  Pain Location 1: Shoulder;Leg;Knee;Head  After treatment:   []            Patient left in no apparent distress sitting up in chair  [x] Patient left in no apparent distress in bed  [x]            Call bell left within reach  [x]            Nursing notified  []            Caregiver present  []            Bed alarm activated    COMMUNICATION/EDUCATION:   The patients plan of care including recommendations, planned interventions, and recommended diet changes were discussed with: Registered Nurse. [x]            Patient/family have participated as able in goal setting and plan of care. [x]            Patient/family agree to work toward stated goals and plan of care. []            Patient understands intent and goals of therapy, but is neutral about his/her participation. []            Patient is unable to participate in goal setting and plan of care.     Thank you for this referral.  Rosalina Mena, SLP

## 2018-07-09 NOTE — PROGRESS NOTES
TRANSFER - OUT REPORT:    Verbal report given to Lorrie DIAMOND(name) on June Payton Dandy  being transferred to PCU(unit) for routine post - op       Report consisted of patients Situation, Background, Assessment and   Recommendations(SBAR). Information from the following report(s) SBAR, Procedure Summary, MAR and Recent Results was reviewed with the receiving nurse. Lines:   Peripheral IV 07/07/18 Right Antecubital (Active)   Site Assessment Clean, dry, & intact 7/9/2018  2:40 PM   Phlebitis Assessment 0 7/9/2018  2:40 PM   Infiltration Assessment 0 7/9/2018  2:40 PM   Dressing Status Clean, dry, & intact 7/9/2018  2:40 PM   Dressing Type Transparent 7/9/2018  2:40 PM   Hub Color/Line Status Infusing 7/9/2018  2:40 PM   Alcohol Cap Used Yes 7/8/2018  9:10 AM        Opportunity for questions and clarification was provided.

## 2018-07-09 NOTE — PROGRESS NOTES
1900: Shift report received Assessment and Vitals completed   2015: Patient requesting Tylenol for headache medicated per orders medication Crushed in applesauce   2200: Patient requesting \"The pain medication that goes in IV\" medicated with 2mg of morphine pain 8/10 in Left shoulder, Leg, Knee, Hand   2230: Patient rang call bell stated \"That medication did not phase me\" Pain 6/10 patient laying on left side, repositioned in the bed   0010: Patient rang call bell asking for \"Hydrocodone and Tylenol\" for left sided pain and headache I explained to patient that hydrocodone has tylenol in it and that she should be careful with how much tylenol is taken. Patient states \"I know but I really need it\"-- Medicated with Hydrocodone and tylenol per patient request crushed in applesauce   0100: Rounded on patient who is asleep in bed appears to be resting comfortably  0515:  Woke patient to draw blood, Patient requesting more pain medication pain 7/10 Morphine given per order   0540: Patient rang call bell complaining of not being able to breath or take deep breath O2-98% on room air, lungs clear, patient forcefully making herself belch taking deep breaths and bearing down- Patient request more pain medication I explained to patient that if she already feels as though she cant breath pain could make that worse patient states \" I can breath now I need my pain medication\" Medicated with Hydrocodone per order crushed in applesauce patient had no problems with swallowing   0630: Patient resting comfortably in bed with no complaints appears to be asleep     Patient continues to ring call bell constantly through out the night asking for different types of medication

## 2018-07-09 NOTE — PROGRESS NOTES
Anesthesia reports 60mg Propofol, 100mg Lidocaine and 200mL NS given during procedure. Received report from anesthesia staff on vital signs and status of patient.

## 2018-07-09 NOTE — PROGRESS NOTES
Physical Therapy Goals  Initiated 7/9/2018  1. Patient will move from supine to sit and sit to supine , scoot up and down and roll side to side in bed with supervision/set-up within 7 day(s). 2.  Patient will transfer from bed to chair and chair to bed with supervision/set-up using the least restrictive device within 7 day(s). 3.  Patient will perform sit to stand with supervision/set-up within 7 day(s). 4.  Patient will ambulate with supervision/set-up for 75 feet with the least restrictive device within 7 day(s). physical Therapy EVALUATION  Patient: Margaret Cortez (78 y.o. female)  Date: 7/9/2018  Primary Diagnosis: Sepsis ([facility] [address]  Sepsis ([facility] [address]  Dysphagia  Procedure(s) (LRB):  ESOPHAGOGASTRODUODENOSCOPY (EGD) (N/A)  ESOPHAGEAL DILATION (N/A) Day of Surgery   Precautions: falls       ASSESSMENT :  Based on the objective data described below, the patient presents with generalized weakness and impaired functional mobility, balance and endurance. Patient appears very anxious and requires increased encouragement to participate in therapy. Patient with long h/o RA with severe structural deformities of fingers and toes. Patient able to complete bed mobility with SBA and transfers with CGA. Patient able to side step to head of bed using RW with CGA of 2- slow but steady overall. Patient requires additional time to complete all mobility tasks. Patient is a LTC resident. As baseline she is ambulatory with a RW. She had a fall 2 weeks ago and reports decreased activity level since then which appears related to fear. Recommend SNF following discharge to improve overall functional mobility prior to returning to LTC. Patient will benefit from skilled intervention to address the above impairments.   Patients rehabilitation potential is considered to be Fair  Factors which may influence rehabilitation potential include:   []         None noted  [x]         Mental ability/status  []         Medical condition  [] Home/family situation and support systems  []         Safety awareness  []         Pain tolerance/management  []         Other:      PLAN :  Recommendations and Planned Interventions:  [x]           Bed Mobility Training             []    Neuromuscular Re-Education  [x]           Transfer Training                   []    Orthotic/Prosthetic Training  [x]           Gait Training                         []    Modalities  [x]           Therapeutic Exercises           []    Edema Management/Control  [x]           Therapeutic Activities            [x]    Patient and Family Training/Education  []           Other (comment):    Frequency/Duration: Patient will be followed by physical therapy  3 times a week to address goals. Discharge Recommendations: Skilled Nursing Facility  Further Equipment Recommendations for Discharge: TBD by SNF     SUBJECTIVE:   Patient stated I can't breathe! Carol Jovelpool - with further questioning she is feeling a lump in her throat/upper chest    OBJECTIVE DATA SUMMARY:   HISTORY:    Past Medical History:   Diagnosis Date    Baker's cyst of knee     Depression     Hypertension     RA (rheumatoid arthritis) ([facility] [address]      Past Surgical History:   Procedure Laterality Date    COLONOSCOPY N/A 2/28/2017    COLONOSCOPY performed by Baltazar Phillip MD at [facility]"" ENDOSCOPY    HX HERNIA REPAIR      HX UROLOGICAL      \"bladder tack\"     Prior Level of Function/Home Situation: difficult to obtain PLOF; prior to fall 2 weeks ago patient was ambulating with RW; assist with ADLs secondary to severe RA      Home Situation  Home Environment: Long term care  One/Two Story Residence: One story  Living Alone: Yes  Support Systems: Long term acute care  Patient Expects to be Discharged to[de-identified] Skilled nursing facility  Current DME Used/Available at Home: [employer], rolling  Tub or Shower Type: Shower    EXAMINATION/PRESENTATION/DECISION MAKING:   Critical Behavior:  Neurologic State: Alert  Orientation Level: Oriented X4  Cognition: Follows commands, Appropriate for age attention/concentration  Safety/Judgement: Awareness of environment  Hearing: Auditory  Auditory Impairment: None    Range Of Motion:  AROM: Generally decreased, functional (except B hands are grossly impaired 2/2 RA)                       Strength:    Strength: Generally decreased, functional                    Tone & Sensation:   Tone: Normal                              Coordination:  Coordination: Grossly decreased, non-functional (in B hands)  Functional Mobility:  Bed Mobility:     Supine to Sit: Stand-by assistance; Additional time  Sit to Supine: Stand-by assistance; Additional time  Scooting: Stand-by assistance; Additional time  Transfers:  Sit to Stand: Contact guard assistance  Stand to Sit: Contact guard assistance                       Balance:   Sitting: Intact  Standing: Impaired  Standing - Static: Good;Constant support  Standing - Dynamic : Fair (using RW to side step to head of bed)  Ambulation/Gait Training:  Distance (ft):  (side stepped 2 feet to head of bed)  Assistive Device: Walker, rolling;Gait belt  Ambulation - Level of Assistance: Contact guard assistance;Assist x2; Additional time        Gait Abnormalities: Decreased step clearance        Base of Support: Widened     Speed/Esther: Delayed      Patient sided stepped to head of bed using RW for support; CGA of 2; no overt LOB noted           Functional Measure:    Elder Mobility Scale    5/20         EMS and G-code impairment scale:  Percentage of impairment CH  0% CI  1-19% CJ  20-39% CK  40-59% CL  60-79% CM  80-99% CN  100%   EMS Score 0-20 20 17-19 13-16 9-12 5-8 1-4 0      Scores under 10  generally these patients are dependent in mobility maneuvers; require help with  basic ADL, such as transfers, toileting and dressing.     Scores between 10  13  generally these patients are borderline in terms of safe mobility and  independence in ADL i.e. they require some help with some mobility maneuvers. Scores over 14  Generally these patients are able to perform mobility maneuvers alone and safely  and are independent in basic ADL.       '    G codes: In compliance with CMSs Claims Based Outcome Reporting, the following G-code set was chosen for this patient based on their primary functional limitation being treated: The outcome measure chosen to determine the severity of the functional limitation was the Elderly Mobility scale with a score of 5/20 which was correlated with the impairment scale. ? Mobility - Walking and Moving Around:     - CURRENT STATUS: CL - 60%-79% impaired, limited or restricted    - GOAL STATUS: CI - 1%-19% impaired, limited or restricted    - D/C STATUS:  ---------------To be determined---------------          Pain:  Pain Scale 1: Numeric (0 - 10)  Pain Intensity 1: 6  Pain Location 1: Eye;Knee  Pain Orientation 1: Left  Pain Description 1: Constant; Aching  Pain Intervention(s) 1: Medication (see MAR)  Activity Tolerance:   VSS  Please refer to the flowsheet for vital signs taken during this treatment. After treatment:   []         Patient left in no apparent distress sitting up in chair  [x]         Patient left in no apparent distress in bed  [x]         Call bell left within reach  [x]         Nursing notified  []         Caregiver present  [x]         Bed alarm activated    COMMUNICATION/EDUCATION:   The patients plan of care was discussed with: Occupational Therapist, Registered Nurse,  and Rehabilitation Attendant. [x]         Fall prevention education was provided and the patient/caregiver indicated understanding. [x]         Patient/family have participated as able in goal setting and plan of care. [x]         Patient/family agree to work toward stated goals and plan of care. []         Patient understands intent and goals of therapy, but is neutral about his/her participation.   []         Patient is unable to participate in goal setting and plan of care.     Thank you for this referral.  Joe Howard, PT   Time Calculation: 22 mins

## 2018-07-09 NOTE — PROGRESS NOTES
9:42AM  Initial Assessment:  CM met with pt to complete assessment and discuss d/c planning. Pt is a 79 yo female admitted for sepsis. She confirmed her demographic information. Pt is a LTC resident at [facility] where she has been living for 2 years. Pt names her dtr, Marleny Lew, as well as other friends and family as her primary supports. Referral sent through CC to [facility] to Jigar duarte who confirmed that pt is LTC with them. CM will continue to follow and assist with d/c planning. Care Management Interventions  PCP Verified by CM: Yes Chelo Garnica MD)  Transition of Care Consult (CM Consult): Long Term Care  Physical Therapy Consult: Yes  Occupational Therapy Consult: Yes  Speech Therapy Consult: No  Current Support Network: [address] [place] [address] (LTC at [facility])  Confirm Follow Up Transport: Family  Plan discussed with Pt/Family/Caregiver: Yes  Freedom of Choice Offered: Yes  Discharge Location  Discharge Placement: Jace Anthony, MSW  Care Manager          Reason for Admission:   Sepsis                   RRAT Score:     18             Do you (patient/family) have any concerns for transition/discharge? No concerns identified               Plan for utilizing home health:     None    Likelihood of readmission?    Moderate             Transition of Care Plan:      LTC

## 2018-07-09 NOTE — PROGRESS NOTES
Endoscope was pre-cleaned at the bedside immediately following procedure by Armand Coats.     Glasses returned to [facility]

## 2018-07-10 LAB
ANION GAP SERPL CALC-SCNC: 7 MMOL/L (ref 5–15)
BASOPHILS # BLD: 0 K/UL (ref 0–0.1)
BASOPHILS NFR BLD: 0 % (ref 0–1)
BUN SERPL-MCNC: 8 MG/DL (ref 6–20)
BUN/CREAT SERPL: 19 (ref 12–20)
CALCIUM SERPL-MCNC: 8.7 MG/DL (ref 8.5–10.1)
CHLORIDE SERPL-SCNC: 100 MMOL/L (ref 97–108)
CO2 SERPL-SCNC: 28 MMOL/L (ref 21–32)
CREAT SERPL-MCNC: 0.43 MG/DL (ref 0.55–1.02)
DIFFERENTIAL METHOD BLD: ABNORMAL
EOSINOPHIL # BLD: 0.1 K/UL (ref 0–0.4)
EOSINOPHIL NFR BLD: 2 % (ref 0–7)
ERYTHROCYTE [DISTWIDTH] IN BLOOD BY AUTOMATED COUNT: 12.6 % (ref 11.5–14.5)
GLUCOSE SERPL-MCNC: 96 MG/DL (ref 65–100)
HCT VFR BLD AUTO: 30.4 % (ref 35–47)
HGB BLD-MCNC: 9.8 G/DL (ref 11.5–16)
IMM GRANULOCYTES # BLD: 0 K/UL (ref 0–0.04)
IMM GRANULOCYTES NFR BLD AUTO: 0 % (ref 0–0.5)
LYMPHOCYTES # BLD: 1.1 K/UL (ref 0.8–3.5)
LYMPHOCYTES NFR BLD: 21 % (ref 12–49)
MCH RBC QN AUTO: 31.3 PG (ref 26–34)
MCHC RBC AUTO-ENTMCNC: 32.2 G/DL (ref 30–36.5)
MCV RBC AUTO: 97.1 FL (ref 80–99)
MONOCYTES # BLD: 0.4 K/UL (ref 0–1)
MONOCYTES NFR BLD: 7 % (ref 5–13)
NEUTS SEG # BLD: 3.5 K/UL (ref 1.8–8)
NEUTS SEG NFR BLD: 69 % (ref 32–75)
NRBC # BLD: 0 K/UL (ref 0–0.01)
NRBC BLD-RTO: 0 PER 100 WBC
PLATELET # BLD AUTO: 247 K/UL (ref 150–400)
PMV BLD AUTO: 9.4 FL (ref 8.9–12.9)
POTASSIUM SERPL-SCNC: 3.7 MMOL/L (ref 3.5–5.1)
RBC # BLD AUTO: 3.13 M/UL (ref 3.8–5.2)
SODIUM SERPL-SCNC: 135 MMOL/L (ref 136–145)
WBC # BLD AUTO: 5 K/UL (ref 3.6–11)

## 2018-07-10 PROCEDURE — 85025 COMPLETE CBC W/AUTO DIFF WBC: CPT | Performed by: INTERNAL MEDICINE

## 2018-07-10 PROCEDURE — 74011250636 HC RX REV CODE- 250/636: Performed by: INTERNAL MEDICINE

## 2018-07-10 PROCEDURE — 97535 SELF CARE MNGMENT TRAINING: CPT | Performed by: OCCUPATIONAL THERAPIST

## 2018-07-10 PROCEDURE — 92526 ORAL FUNCTION THERAPY: CPT

## 2018-07-10 PROCEDURE — 74011250637 HC RX REV CODE- 250/637: Performed by: SPECIALIST

## 2018-07-10 PROCEDURE — 77030038269 HC DRN EXT URIN PURWCK BARD -A

## 2018-07-10 PROCEDURE — 65660000000 HC RM CCU STEPDOWN

## 2018-07-10 PROCEDURE — 36415 COLL VENOUS BLD VENIPUNCTURE: CPT | Performed by: INTERNAL MEDICINE

## 2018-07-10 PROCEDURE — 80048 BASIC METABOLIC PNL TOTAL CA: CPT | Performed by: INTERNAL MEDICINE

## 2018-07-10 PROCEDURE — 74011250637 HC RX REV CODE- 250/637: Performed by: INTERNAL MEDICINE

## 2018-07-10 PROCEDURE — 74011000258 HC RX REV CODE- 258: Performed by: INTERNAL MEDICINE

## 2018-07-10 RX ORDER — MORPHINE SULFATE 4 MG/ML
2 INJECTION INTRAVENOUS
Status: DISCONTINUED | OUTPATIENT
Start: 2018-07-10 | End: 2018-07-11 | Stop reason: HOSPADM

## 2018-07-10 RX ORDER — PANTOPRAZOLE SODIUM 40 MG/1
40 TABLET, DELAYED RELEASE ORAL
Status: DISCONTINUED | OUTPATIENT
Start: 2018-07-10 | End: 2018-07-11 | Stop reason: HOSPADM

## 2018-07-10 RX ORDER — BACLOFEN 10 MG/1
5 TABLET ORAL 3 TIMES DAILY
Status: DISCONTINUED | OUTPATIENT
Start: 2018-07-10 | End: 2018-07-11 | Stop reason: HOSPADM

## 2018-07-10 RX ADMIN — NYSTATIN 500000 UNITS: 100000 SUSPENSION ORAL at 08:15

## 2018-07-10 RX ADMIN — Medication 10 ML: at 13:49

## 2018-07-10 RX ADMIN — NEOMYCIN SULFATE, POLYMYXIN B SULFATE, AND DEXAMETHASONE: 3.5; 10000; 1 OINTMENT OPHTHALMIC at 16:52

## 2018-07-10 RX ADMIN — THERA TABS 1 TABLET: TAB at 08:16

## 2018-07-10 RX ADMIN — CLONAZEPAM 0.5 MG: 0.5 TABLET, ORALLY DISINTEGRATING ORAL at 16:31

## 2018-07-10 RX ADMIN — METOPROLOL TARTRATE 25 MG: 25 TABLET ORAL at 08:16

## 2018-07-10 RX ADMIN — NYSTATIN 500000 UNITS: 100000 SUSPENSION ORAL at 21:32

## 2018-07-10 RX ADMIN — Medication 10 ML: at 02:06

## 2018-07-10 RX ADMIN — FOLIC ACID 1 MG: 1 TABLET ORAL at 08:16

## 2018-07-10 RX ADMIN — NYSTATIN 500000 UNITS: 100000 SUSPENSION ORAL at 13:44

## 2018-07-10 RX ADMIN — HYDROCODONE BITARTRATE AND ACETAMINOPHEN 1 TABLET: 5; 325 TABLET ORAL at 04:28

## 2018-07-10 RX ADMIN — DEXTROSE MONOHYDRATE AND SODIUM CHLORIDE 75 ML/HR: 5; .9 INJECTION, SOLUTION INTRAVENOUS at 02:05

## 2018-07-10 RX ADMIN — ATORVASTATIN CALCIUM 20 MG: 20 TABLET, FILM COATED ORAL at 21:32

## 2018-07-10 RX ADMIN — ENOXAPARIN SODIUM 40 MG: 100 INJECTION SUBCUTANEOUS at 16:29

## 2018-07-10 RX ADMIN — NEOMYCIN SULFATE, POLYMYXIN B SULFATE, AND DEXAMETHASONE: 3.5; 10000; 1 OINTMENT OPHTHALMIC at 21:33

## 2018-07-10 RX ADMIN — MORPHINE SULFATE 2 MG: 2 INJECTION, SOLUTION INTRAMUSCULAR; INTRAVENOUS at 02:06

## 2018-07-10 RX ADMIN — PANTOPRAZOLE SODIUM 40 MG: 40 TABLET, DELAYED RELEASE ORAL at 16:30

## 2018-07-10 RX ADMIN — TRAZODONE HYDROCHLORIDE 150 MG: 100 TABLET ORAL at 21:32

## 2018-07-10 RX ADMIN — NEOMYCIN SULFATE, POLYMYXIN B SULFATE, AND DEXAMETHASONE: 3.5; 10000; 1 OINTMENT OPHTHALMIC at 13:44

## 2018-07-10 RX ADMIN — VENLAFAXINE HYDROCHLORIDE 150 MG: 150 CAPSULE, EXTENDED RELEASE ORAL at 08:16

## 2018-07-10 RX ADMIN — BACLOFEN 5 MG: 10 TABLET ORAL at 21:32

## 2018-07-10 RX ADMIN — ACETAMINOPHEN 500 MG: 500 TABLET, FILM COATED ORAL at 21:32

## 2018-07-10 RX ADMIN — CLONAZEPAM 0.5 MG: 0.5 TABLET, ORALLY DISINTEGRATING ORAL at 21:32

## 2018-07-10 RX ADMIN — NEOMYCIN SULFATE, POLYMYXIN B SULFATE, AND DEXAMETHASONE: 3.5; 10000; 1 OINTMENT OPHTHALMIC at 08:16

## 2018-07-10 RX ADMIN — BACLOFEN 5 MG: 10 TABLET ORAL at 08:15

## 2018-07-10 RX ADMIN — LEVOFLOXACIN 750 MG: 5 INJECTION, SOLUTION INTRAVENOUS at 13:49

## 2018-07-10 RX ADMIN — Medication 10 ML: at 21:33

## 2018-07-10 RX ADMIN — LEVOTHYROXINE SODIUM 25 MCG: 25 TABLET ORAL at 04:28

## 2018-07-10 RX ADMIN — DOCUSATE SODIUM 100 MG: 100 CAPSULE, LIQUID FILLED ORAL at 16:29

## 2018-07-10 RX ADMIN — PANTOPRAZOLE SODIUM 40 MG: 40 TABLET, DELAYED RELEASE ORAL at 08:16

## 2018-07-10 RX ADMIN — DOCUSATE SODIUM 100 MG: 100 CAPSULE, LIQUID FILLED ORAL at 08:16

## 2018-07-10 RX ADMIN — BACLOFEN 5 MG: 10 TABLET ORAL at 16:30

## 2018-07-10 RX ADMIN — CLONAZEPAM 0.5 MG: 0.5 TABLET, ORALLY DISINTEGRATING ORAL at 08:16

## 2018-07-10 RX ADMIN — NYSTATIN 500000 UNITS: 100000 SUSPENSION ORAL at 16:29

## 2018-07-10 NOTE — PROGRESS NOTES
Bedside and Verbal shift change report given to LOBO Gutiérrez (oncoming nurse) by LOBO Orellana (offgoing nurse). Report included the following information SBAR, Kardex, Recent Results, Med Rec Status and Cardiac Rhythm SR.      Zone Phone:   [phone]        Significant changes during shift:  none     Patient Information      78 yr old, admitted on 7/7/18, patient of Dr. Stanislav White, admitted from [address]  Problem List               Past Medical History:   Diagnosis Date    Baker's cyst of knee       Depression       Hypertension       RA (rheumatoid arthritis) ([facility] [address]                       Past Surgical History:   Procedure Laterality Date    COLONOSCOPY N/A 2/28/2017      COLONOSCOPY performed by Governor Kym MD at [facility] ENDOSCOPY    HX HERNIA REPAIR          HX UROLOGICAL            \"bladder tack\"                Core Measures:      CVA: No  CHF: No  PNA: No       Activity Status:      OOB to Chair: No  Ambulated this shift No  Bed Rest Yes      Supplemental N1: (YM Applicable)      0xygen 2 L NC PRN          LINES AND DRAINS:      PIV      DVT prophylaxis:      DVT prophylaxis Med- Yes; Lovenox  DVT prophylaxis SCD or MARISSA- No       Wounds: (If Applicable)      Wounds- R forehead laceration    Location  R Forehead    Patient Safety:      Falls Score Total Score:  3  Safety Level_______  Bed Alarm On? Yes  Sitter?  No      Plan for upcoming shift: MRCP  procedure tomorrow      Discharge Plan: TBD      Active Consults:  GI

## 2018-07-10 NOTE — PROGRESS NOTES
Problem: Self Care Deficits Care Plan (Adult)  Goal: *Acute Goals and Plan of Care (Insert Text)  Occupational Therapy Goals  Initiated 7/9/2018  1. Patient will perform self-feeding with minimal assistance/contact guard assist within 7 day(s). 2.  Patient will perform grooming seated on EOB with moderate assistance  within 7 day(s). 3.  Patient will perform bathing seated on EOB with moderate assistance  within 7 day(s). 4.  Patient will perform toilet transfers with minimal assistance/contact guard assist within 7 day(s). 5.  Patient will perform all aspects of toileting with moderate assistance  within 7 day(s). 6.  Patient will participate in upper extremity therapeutic exercise/activities with moderate assistance  for 5 minutes within 7 day(s). 7.  Patient will utilize energy conservation techniques during functional activities with verbal cues within 7 day(s). Occupational Therapy TREATMENT  Patient: Luma Hogan (78 y.o. female)  Date: 7/10/2018  Diagnosis: Sepsis ([address]  Sepsis ([address]  Dysphagia  Sepsis ([address]  manometry Sepsis ([address]  Procedure(s) (LRB):  ESOPHAGOGASTRODUODENOSCOPY (EGD) (N/A)  ESOPHAGEAL DILATION (N/A)  ESOPHAGOGASTRODUODENAL (EGD) BIOPSY (N/A)  ENDOSCOPIC BRUSHING (N/A) 1 Day Post-Op  Precautions:      ASSESSMENT:  Patient demonstrating minimal motivation, asking to be fed after demonstrating the ability to feed herself. Despite her severe RA related hand deformities, she was able to feed her self at a supervision/setup level and also performed some grooming with supervision setup. Patient still c/o feeling as though she is choking and burps frequently, but was not in any distress when swallowing her food. Nursing well aware of patient c/o feeling as though she choking.     Progression toward goals:  []       Improving appropriately and progressing toward goals  [x]       Improving slowly and progressing toward goals  []       Not making progress toward goals and plan of care will be adjusted     PLAN:  Patient continues to benefit from skilled intervention to address the above impairments. Continue treatment per established plan of care. Discharge Recommendations:  Return to LTC VS. SNF           OBJECTIVE DATA SUMMARY:   Cognitive/Behavioral Status:  Neurologic State: Alert  Orientation Level: Oriented X4  Cognition: Follows commands; Appropriate for age attention/concentration        Safety/Judgement: Awareness of environment; Insight into deficits  Functional Mobility and Transfers for ADLs:  Bed Mobility:    Balance:  Session performed in bed with HOB raised  ADL Intervention:  Feeding  Feeding Assistance:  (No modification to fork or spoon needed. Cup with handle used)  Container Management: Total assistance (dependent)  Utensil Management: Supervision/set-up  Food to Mouth: Supervision/set-up  Drink to Mouth: Supervision/set-up  Cues: Verbal cues provided    Grooming  Brushing/Combing Hair: Supervision/set-up (performed in bed with HOB raised)    Cognitive Retraining  Safety/Judgement: Awareness of environment; Insight into deficits  Pain:  Pain Scale 1: Numeric (0 - 10)  Pain Intensity 1: 0  Pain Location 1: Generalized     Pain Description 1: Sharp  Pain Intervention(s) 1: Medication (see MAR)    After treatment:   [x] Patient left in no apparent distress sitting up in chair  [] Patient left in no apparent distress in bed  [x] Call bell left within reach  [x] Nursing notified  [] Caregiver present  [x] Bed alarm activated    COMMUNICATION/COLLABORATION:   The patients plan of care was discussed with: Registered Nurse    Mario Geller OTR/L  Time Calculation: 25 mins

## 2018-07-10 NOTE — PROGRESS NOTES
Bedside shift change report given to Kusum Dunn (oncoming nurse) by Soha Cornejo (offgoing nurse). Report included the following information SBAR, Kardex, Intake/Output, MAR and Cardiac Rhythm NSR.     5716- Speech Therapy paged. Per MD and patient request, patient needs to be reevaluated. Patient is complaining of applesauce being stuck in her throat. Patient is not coughing, just belching. Patient doesn't seem short of breath. 8147- Call placed to pharmacy to double check medication. Per family the patient is supposed to take Maxitrol Ointment 4 times a day. Patient is receiving Dexacine QID which is the same medication per pharmacy. Family notified.

## 2018-07-10 NOTE — ROUTINE PROCESS
TRANSFER - IN REPORT:    Verbal report received from [facility] on June Marijo Bulls  being received from PCU for routine progression of care      Report consisted of patients Situation, Background, Assessment and   Recommendations(SBAR). Information from the following report(s) SBAR, Kardex, Procedure Summary, MAR and Recent Results was reviewed with the receiving nurse. Opportunity for questions and clarification was provided. Assessment completed upon patients arrival to unit and care assumed.

## 2018-07-10 NOTE — PROGRESS NOTES
PCU SHIFT NURSING NOTE      Bedside and Verbal shift change report given to Marly Umanzor RN (oncoming nurse) by Christine Tobar RN (offgoing nurse). Report included the following information SBAR, Kardex, MAR and Recent Results. Shift Summary:   0300: TRANSFER - OUT REPORT:    Verbal report given to Joseline matias RN(name) on June Patsie Kingdom  being transferred to Neuro(unit) for routine progression of care       Report consisted of patients Situation, Background, Assessment and   Recommendations(SBAR). Information from the following report(s) SBAR, Kardex, MAR, Recent Results and Cardiac Rhythm NSR was reviewed with the receiving nurse. Lines:   Peripheral IV 07/07/18 Right Antecubital (Active)   Site Assessment Clean, dry, & intact 7/9/2018  8:15 PM   Phlebitis Assessment 0 7/9/2018  8:15 PM   Infiltration Assessment 0 7/9/2018  8:15 PM   Dressing Status Clean, dry, & intact 7/9/2018  8:15 PM   Dressing Type Transparent 7/9/2018  8:15 PM   Hub Color/Line Status Pink; Infusing 7/9/2018  8:15 PM   Alcohol Cap Used Yes 7/8/2018  9:10 AM        Opportunity for questions and clarification was provided. Patient transported with:   Monitor  Patient-specific medications from Pharmacy  Registered Nurse                     Admission Date 7/7/2018   Admission Diagnosis Sepsis ([facility]  Sepsis ([facility]  Dysphagia  Sepsis ([facility]   Consults IP CONSULT TO GASTROENTEROLOGY        Consults   []PT   []OT   []Speech   []Case Management      [] Palliative      Cardiac Monitoring Order   []Yes   []No     IV drips   []Yes    Drip:                            Dose:  Drip:                            Dose:  Drip:                            Dose:   []No     GI Prophylaxis   []Yes   []No         DVT Prophylaxis   SCDs:             Jimenez stockings:         [] Medication   []Contraindicated   []None      Activity Level Activity Level: Bed Rest     Activity Assistance: Complete care   Purposeful Rounding every 1-2 hour?    []Yes   Acevedo Score  Total Score: 3 Bed Alarm (If score 3 or >)   []Yes   [] Refused (See signed refusal form in chart)   Sage Score  Sage Score: 12   Sage Score (if score 14 or less)   []PMT consult   []Wound Care consult      []Specialty bed   [] Nutrition consult          Needs prior to discharge:   Home O2 required:    []Yes   []No    If yes, how much O2 required? Other:    Last Bowel Movement: Last Bowel Movement Date: 07/04/18      Influenza Vaccine Received Flu Vaccine for Current Season (usually Sept-March): Not Flu Season        Pneumonia Vaccine           Diet Active Orders   Diet    DIET DYSPHAGIA PUREED (NDD1)      LDAs               Peripheral IV 07/07/18 Right Antecubital (Active)   Site Assessment Clean, dry, & intact 7/9/2018  8:15 PM   Phlebitis Assessment 0 7/9/2018  8:15 PM   Infiltration Assessment 0 7/9/2018  8:15 PM   Dressing Status Clean, dry, & intact 7/9/2018  8:15 PM   Dressing Type Transparent 7/9/2018  8:15 PM   Hub Color/Line Status Pink; Infusing 7/9/2018  8:15 PM   Alcohol Cap Used Yes 7/8/2018  9:10 AM          External Female Catheter 07/07/18 (Active)   Site Assessment Clean, dry, & intact 7/10/2018  2:04 AM   Repositioned Yes 7/10/2018  2:04 AM   Perineal Care Yes 7/10/2018  2:04 AM   Wick Changed Yes 7/10/2018  2:04 AM   Suction Canister/Tubing Changed No 7/10/2018  2:04 AM   Urine Output (mL) 450 ml 7/10/2018  2:04 AM                Urinary Catheter      Intake & Output   Date 07/09/18 0700 - 07/10/18 0659 07/10/18 0700 - 07/11/18 0659   Shift 0700-1859 1900-0659 24 Hour Total 2863-6160 9994-7415 24 Hour Total   I  N  T  A  K  E   I.V.  (mL/kg/hr) 447.5  (0.5)  447.5         Volume (dextrose 5% and 0.9% NaCl infusion) 197.5  197.5         Volume (0.9% sodium chloride infusion) 200  200         Volume (cefTRIAXone (ROCEPHIN) 1 g in 0.9% sodium chloride (MBP/ADV) 50 mL) 50  50       Shift Total  (mL/kg) 447.5  (6.1)  447.5  (6.1)      O  U  T  P  U  T   Urine  (mL/kg/hr) 400  (0.5) 450 850         Urine Voided 400  400         Urine Occurrence(s) 1 x  1 x         Urine Output (mL) (External Female Catheter 07/07/18)  450 450       Shift Total  (mL/kg) 400  (5.4) 450  (6.1) 850  (11.5)      NET 47.5 -450 -402.5      Weight (kg) 73.9 73.9 73.9 73.9 73.9 73.9         Readmission Risk Assessment Tool Score Medium Risk            18       Total Score        3 Has Seen PCP in Last 6 Months (Yes=3, No=0)    2 . Living with Significant Other. Assisted Living. LTAC. SNF. or   Rehab    9 Pt.  Coverage (Medicare=5 , Medicaid, or Self-Pay=4)    4 Charlson Comorbidity Score (Age + Comorbid Conditions)        Criteria that do not apply:    Patient Length of Stay (>5 days = 3)    IP Visits Last 12 Months (1-3=4, 4=9, >4=11)       Expected Length of Stay - - -   Actual Length of Stay 2

## 2018-07-10 NOTE — PROGRESS NOTES
Problem: Dysphagia (Adult)  Goal: *Acute Goals and Plan of Care (Insert Text)  Speech pathology goals  Initiated 7/9/2018  1. Patient will tolerate diet per GI with no overt s/s aspiration within 7 days   Speech language pathology dysphagia treatment/discharge  Patient: Eboni Herrera (78 y.o. female)  Date: 7/10/2018  Diagnosis: Sepsis ([address]  Sepsis ([address]  Dysphagia  Sepsis ([address]  manometry Sepsis ([address]  Procedure(s) (LRB):  ESOPHAGOGASTRODUODENOSCOPY (EGD) (N/A)  ESOPHAGEAL DILATION (N/A)  ESOPHAGOGASTRODUODENAL (EGD) BIOPSY (N/A)  ENDOSCOPIC BRUSHING (N/A) 1 Day Post-Op  Precautions:      ASSESSMENT:  Pt seen today for education. The pt and 3 family members were educated about esophageal function. The pt has reduced motility and intraesophageal backflow that most likely causes her to feel food is stuck in her chest. She has experienced dysphagia for some time per her family. It has now worsened to the point she needs intervention. She is having esophageal manometry tomorrow. She has had dilatation. Barium swallow revealed Markedly tertiary wave activity involving the esophagus with significant  to-and-fro action of contrast within the esophagus and relatively poor emptying.      Progression toward goals:  [x]           Improving appropriately and progressing toward goals  []           Improving slowly and progressing toward goals  []           Not making progress toward goals and plan of care will be adjusted     PLAN:  We will sign off for now. Patient will be discharged from speech therapy at this time. Rationale for discharge:  [x]      Goals Achieved  []      [address]  []      Patient not participating in therapy  []      Other:  Discharge Recommendations:  None     SUBJECTIVE:   Patient stated she needed the bedpan. Assisted dtr with placement.      OBJECTIVE:   Cognitive and Communication Status:  Neurologic State: Alert  Orientation Level: Oriented X4  Cognition: Follows commands    Perception: (pt reports that her vision is poor)    Perseveration: No perseveration noted    Safety/Judgement: Awareness of environment, Fall prevention                  Pain:  Pain Scale 1: Numeric (0 - 10)  Pain Intensity 1: 0  Pain Location 1: Generalized  After treatment:   []                Patient left in no apparent distress sitting up in chair  [x]                Patient left in no apparent distress in bed  [x]                Call bell left within reach  [x]                Nursing notified  [x]                Caregiver present  []                Bed alarm activated    COMMUNICATION/EDUCATION:   Patient was educated regarding Her deficit(s) of dysphagia as this relates to Her diagnosis of esophageal dysphagia. She demonstrated Good understanding . The patients plan of care including recommendations, planned interventions, and recommended diet changes were discussed with: Registered Nurse. []                Posted safety precautions in patient's room.     LOVE Mccurdy  Time Calculation: 10 mins

## 2018-07-10 NOTE — PROGRESS NOTES
Hospitalist Progress Note NAME: Bogdan Rondon :  1940 MRN:  [id number] Assessment / Plan: 
Sepsis possibly due to UTI, from UA  POA - resolved Now on Levaquin based on culture. morgenela grew of urine cx. Lactic acid Nl, BP is Within Acceptable Range. WBC Within Acceptable Range. Afebrile. will wait for blood and urine cultures. Will continue to monitor vitals. 7/10: 
Continue Levofloxacin - end date  Dysphagia , unspecified yet. s/p barium swallow Report as below Markedly tertiary wave activity involving the esophagus with significant 
to-and-fro action of contrast within the esophagus and relatively poor emptying. GI Inputs and recommendations Appreciated. S/p ESOPHAGOGASTRODUODENOSCOPY (EGD). ESOPHAGEAL DILATION 
ESOPHAGOGASTRODUODENAL (EGD) BIOPSY and ENDOSCOPIC BRUSHING. 
 
7/10: 
Pt still complaining of \"not being able to eat. \" But when questioned further, pt is able to swallow food. At bedside it seems as if she is trying to bring up her food almost. Will continue following with GI - esophageal manometry is scheduled for noon tomorrow. Hypertension: continue the metoprolol RA (rheumatoid arthritis) ([address] Humira Q week Anxiety and Depression Continue effexor and clonipine. Wound on the forehead and bruising around the right eye , after a fall from a week ago before hospital admission. Eye drops and wound care. 25.0 - 29.9 Overweight / Body mass index is 29.68 kg/(m^2). Code status: Full Prophylaxis: Heparin Recommended Disposition:  PT, OT, RN Subjective: Chief Complaint / Reason for Physician Visit Still complains of difficulty swallowing. Discussed with RN events overnight. Review of Systems: 
Symptom Y/N Comments  Symptom Y/N Comments Fever/Chills n   Chest Pain n   
Poor Appetite y   Edema Cough n   Abdominal Pain n   
Sputum    Joint Pain SOB/MADERA n   Pruritis/Rash Nausea/vomit n   Tolerating PT/OT Diarrhea Tolerating Diet n   
Constipation    Other Could NOT obtain due to:   
 
Objective: VITALS:  
Last 24hrs VS reviewed since prior progress note. Most recent are: 
Patient Vitals for the past 24 hrs: 
 Temp Pulse Resp BP SpO2  
07/10/18 1122 97.9 °F (36.6 °C) 86 18 130/76 99 % 07/10/18 0726 97.7 °F (36.5 °C) 74 18 139/69 99 % 07/10/18 0343 97.9 °F (36.6 °C) 78 18 133/71 100 % 07/09/18 2324 97.7 °F (36.5 °C) 80 16 115/64 99 % 07/09/18 2013 97.4 °F (36.3 °C) 84 16 121/62 96 % 07/09/18 1539 97 °F (36.1 °C) 79 18 156/71 100 % 07/09/18 1444 - 95 20 - -  
07/09/18 1442 - 93 - - 95 % 07/09/18 1441 - 94 18 150/88 97 % 07/09/18 1438 98.2 °F (36.8 °C) 87 17 134/85 96 % 07/09/18 1430 - 80 22 128/61 99 % 07/09/18 1424 - 78 18 124/60 100 % 07/09/18 1345 - 86 15 144/83 99 % Intake/Output Summary (Last 24 hours) at 07/10/18 1222 Last data filed at 07/10/18 3747 Gross per 24 hour Intake          1776.25 ml Output              450 ml Net          1326.25 ml PHYSICAL EXAM: 
General: Alert, cooperative, no acute distress, healed laceration on forehead and facial bruising present EENT:  EOMI. Anicteric sclerae. MMM Resp:  CTA bilaterally, no wheezing or rales. No accessory muscle use CV:  Regular  rhythm,  No edema GI:  Soft, Non distended, Non tender.  +Bowel sounds Neurologic:  Alert and oriented X 3, normal speech Psych:   Fair insight. Not anxious nor agitated Extremities: B/L deformities in both hands and feet Reviewed most current lab test results and cultures  YES Reviewed most current radiology test results   YES Review and summation of old records today    NO Reviewed patient's current orders and MAR    YES 
PMH/SH reviewed - no change compared to H&P 
________________________________________________________________________ Care Plan discussed with: 
  Comments Patient x Family RN x Care Manager Consultant Multidiciplinary team rounds were held today with , nursing, pharmacist and clinical coordinator. Patient's plan of care was discussed; medications were reviewed and discharge planning was addressed. ________________________________________________________________________ Total NON critical care TIME:  25   Minutes Total CRITICAL CARE TIME Spent:   Minutes non procedure based Comments >50% of visit spent in counseling and coordination of care    
________________________________________________________________________ Iam Cabrera MD  
 
Procedures: see electronic medical records for all procedures/Xrays and details which were not copied into this note but were reviewed prior to creation of Plan. LABS: 
I reviewed today's most current labs and imaging studies. Pertinent labs include: 
Recent Labs  
   07/10/18 
 0500  07/09/18 
 0455  07/08/18 
 0415 WBC  5.0  4.6  7.8 HGB  9.8*  9.8*  9.9*  
HCT  30.4*  30.7*  30.7* PLT  247  246  277 Recent Labs  
   07/10/18 
 0500  07/09/18 
 0455  07/08/18 
 9550 NA  135*  137  136  
K  3.7  3.6  4.1 CL  100  102  102 CO2  28  29  30  
GLU  96  94  117* BUN  8  7  4* CREA  0.43*  0.40*  0.41* CA  8.7  8.8  8.8 Signed: Iam Cabrera MD

## 2018-07-10 NOTE — PROGRESS NOTES
CM met with pt and pt's daughter and discussed the discharge plan of returning to [facility]. Pt has been a LTC resident there for the past year. Pt has a private room and pt's daughter is paying to hold it while in the hospital. Pt has been accepted back to [facility]. Referral sent by previous CM via [facility]. CM will continue to follow pt for discharge planning needs.      Agueda Barth, [address]

## 2018-07-10 NOTE — PROGRESS NOTES
Patient is schedule in ED [facility] tomorrow Wed 7/11/18 at 15 Noon for Esophageal Manometry.

## 2018-07-10 NOTE — ROUTINE PROCESS
Bedside and Verbal shift change report given to [address] [place] [address] [phone]) by Rahul Walden nurse). Report included the following information SBAR, Kardex, Recent Results, Med Rec Status and Cardiac Rhythm SR.      Zone Phone:   [phone]        Significant changes during shift:  Received transfer from PCU    Patient Information      78 yr old, admitted on 7/7/18, patient of Dr. Isaura Fung, admitted from [address]  Problem List          Past Medical History:   Diagnosis Date    Baker's cyst of knee      Depression      Hypertension      RA (rheumatoid arthritis) ([facility]                  Past Surgical History:   Procedure Laterality Date    COLONOSCOPY N/A 2/28/2017     COLONOSCOPY performed by Orlando Starkey MD at [facility]\"" ENDOSCOPY    HX HERNIA REPAIR        HX UROLOGICAL         \"bladder tack\"              Core Measures:      CVA: No  CHF: No  PNA: No      Activity Status:      OOB to Chair: No  Ambulated this shift No  Bed Rest Yes      Supplemental N8: (CB Applicable)      0xygen 2 L NC PRN          LINES AND DRAINS:      PIV      DVT prophylaxis:      DVT prophylaxis Med- Yes; Lovenox  DVT prophylaxis SCD or MARISSA- No       Wounds: (If Applicable)      Wounds- R forehead laceration    Location  R Forehead    Patient Safety:      Falls Score Total Score:  3  Safety Level_______  Bed Alarm On? Yes  Sitter?  No      Plan for upcoming shift: PT/OT/Speech        Discharge Plan: TBD      Active Consults:  GI

## 2018-07-11 VITALS
OXYGEN SATURATION: 98 % | BODY MASS INDEX: 29.94 KG/M2 | SYSTOLIC BLOOD PRESSURE: 151 MMHG | HEIGHT: 62 IN | RESPIRATION RATE: 18 BRPM | TEMPERATURE: 98 F | HEART RATE: 89 BPM | DIASTOLIC BLOOD PRESSURE: 55 MMHG | WEIGHT: 162.7 LBS

## 2018-07-11 PROCEDURE — 74011000250 HC RX REV CODE- 250: Performed by: SPECIALIST

## 2018-07-11 PROCEDURE — 74011250636 HC RX REV CODE- 250/636: Performed by: INTERNAL MEDICINE

## 2018-07-11 PROCEDURE — 76040000007: Performed by: SPECIALIST

## 2018-07-11 PROCEDURE — 77030038269 HC DRN EXT URIN PURWCK BARD -A

## 2018-07-11 PROCEDURE — 74011250637 HC RX REV CODE- 250/637: Performed by: INTERNAL MEDICINE

## 2018-07-11 RX ORDER — LEVOFLOXACIN 500 MG/1
500 TABLET, FILM COATED ORAL DAILY
Qty: 2 TAB | Refills: 0 | Status: SHIPPED | OUTPATIENT
Start: 2018-07-11 | End: 2019-07-16

## 2018-07-11 RX ORDER — LIDOCAINE HYDROCHLORIDE 20 MG/ML
JELLY TOPICAL ONCE
Status: COMPLETED | OUTPATIENT
Start: 2018-07-11 | End: 2018-07-11

## 2018-07-11 RX ADMIN — DOCUSATE SODIUM 100 MG: 100 CAPSULE, LIQUID FILLED ORAL at 14:49

## 2018-07-11 RX ADMIN — METOPROLOL TARTRATE 25 MG: 25 TABLET ORAL at 14:50

## 2018-07-11 RX ADMIN — HYDROCODONE BITARTRATE AND ACETAMINOPHEN 1 TABLET: 5; 325 TABLET ORAL at 14:49

## 2018-07-11 RX ADMIN — Medication 10 ML: at 06:35

## 2018-07-11 RX ADMIN — CLONAZEPAM 0.5 MG: 0.5 TABLET, ORALLY DISINTEGRATING ORAL at 15:24

## 2018-07-11 RX ADMIN — CLONAZEPAM 0.5 MG: 0.5 TABLET, ORALLY DISINTEGRATING ORAL at 09:57

## 2018-07-11 RX ADMIN — FOLIC ACID 1 MG: 1 TABLET ORAL at 14:49

## 2018-07-11 RX ADMIN — NEOMYCIN SULFATE, POLYMYXIN B SULFATE, AND DEXAMETHASONE: 3.5; 10000; 1 OINTMENT OPHTHALMIC at 09:56

## 2018-07-11 RX ADMIN — LIDOCAINE HYDROCHLORIDE 5 ML: 20 JELLY TOPICAL at 12:22

## 2018-07-11 RX ADMIN — VENLAFAXINE HYDROCHLORIDE 150 MG: 150 CAPSULE, EXTENDED RELEASE ORAL at 14:54

## 2018-07-11 RX ADMIN — MORPHINE SULFATE 2 MG: 4 INJECTION INTRAVENOUS at 10:27

## 2018-07-11 NOTE — PROGRESS NOTES
TRANSFER - IN REPORT:    Verbal report received from Merlene(name) on June Philbert Flash  being received from Neuro Tele(unit) for ordered procedure      Report consisted of patients Situation, Background, Assessment and   Recommendations(SBAR). Information from the following report(s) SBAR, Intake/Output and MAR was reviewed with the receiving nurse. Opportunity for questions and clarification was provided. Assessment completed upon patients arrival to unit and care assumed.

## 2018-07-11 NOTE — PROGRESS NOTES
Problem: Pressure Injury - Risk of  Goal: *Prevention of pressure injury  Document Sage Scale and appropriate interventions in the flowsheet. Outcome: Progressing Towards Goal  Pressure Injury Interventions:  Sensory Interventions: Assess changes in LOC    Moisture Interventions: Check for incontinence Q2 hours and as needed    Activity Interventions: PT/OT evaluation    Mobility Interventions: PT/OT evaluation, Pressure redistribution bed/mattress (bed type)    Nutrition Interventions: Document food/fluid/supplement intake    Friction and Shear Interventions: Feet elevated on foot rest               Problem: Falls - Risk of  Goal: *Absence of Falls  Document Reinaldo Fall Risk and appropriate interventions in the flowsheet.    Outcome: Progressing Towards Goal  Fall Risk Interventions:  Mobility Interventions: Patient to call before getting OOB, Communicate number of staff needed for ambulation/transfer         Medication Interventions: Patient to call before getting OOB, Evaluate medications/consider consulting pharmacy    Elimination Interventions: Bed/chair exit alarm    History of Falls Interventions: Bed/chair exit alarm, Door open when patient unattended

## 2018-07-11 NOTE — PROGRESS NOTES
PT note:    Chart reviewed and noted patient is off the floor to procedure. Will follow up for PT session. Recommending SNF at discharge.      Nirmal Connor, PT, DPT

## 2018-07-11 NOTE — PROGRESS NOTES
Post procedure patient complained of chest pain, sweating, unable to breathe. Noted that patient diaphoretic, tachycardic, face red, patient clutching chest.  HOB lowered, NC placed at 4L O2. Rapid Response called. Upon placing EKG electrodes in order to have cardiac tracing, and upon staff arrival to assist in RR, patient states she never claimed to say her chest hurt or she couldn't breathe. Anesthesia present. Patient resting comfortably. 1339   Patient stable. Patient transported back to room, Neuro Tele, via stretcher, O2, and two RN's. Patient transferred to assigned bed in room with no difficulties. Report given to Rosi harry RN.

## 2018-07-11 NOTE — PROGRESS NOTES
Patient for Esophageal Manometry today at 12 noon. Results available 1-2 weeks later. F/U 2 weeks post discharge for results. Keep on Dysphagia, soft diet until then.

## 2018-07-11 NOTE — PROGRESS NOTES
Pt is discharged and will be transported to [facility] via [facility] transport. CM set up transport via [employer] and pickup time is 4pm. CM met with pt and pt's daughter and provided the 2nd IM Medicare letter, they understood and pt's daughter signed it. CM provided pt's nurse with the contact information to call report, send MARS, AVS and Rx. CM called the admissions dept at [facility] and informed them of pt's discharge and pickup time. Care Management Interventions  PCP Verified by CM:  Yes Beto Lopez MD)  Mode of Transport at Discharge: BLS ([facility] medical transport)  Hospital Transport Time of Discharge: 1600  Transition of Care Consult (CM Consult): SNF ([facility])  Partner SNF: Yes  Discharge Durable Medical Equipment: No  Physical Therapy Consult: Yes  Occupational Therapy Consult: Yes  Speech Therapy Consult: Yes  Current Support Network: [address] (LTC at [facility])  Confirm Follow Up Transport: Other (see comment) (medical transport )  Plan discussed with Pt/Family/Caregiver: Yes  Freedom of Choice Offered: Yes  Discharge Location  Discharge Placement: Skilled nursing facility    Scott Bertrand, [address]

## 2018-07-11 NOTE — PROGRESS NOTES
Responding to RAT call in endoscopy s/p procedure (Esophageal Manometry). Nurse reports pt. C/o chest pain and SOB post procedure. Upon arrival, pt. On stretcher, skin warm and dry to touch color acyanotic but with multiple areas of ecchymosis bilatral arms,fingers,knees  and laceration with stitches on forehead. Alert & oriented. Pt. Denies chest pain and shortness of breath at this time. Pt. Reports \"my face got hot\"  respirations even and regular. Scope SR. EKG order. Endoscopy nurse to notify staff nurse and MD.  Note pt.admitted with c/o chest pain and left wall discomfort. S/p fall prior to admission. [address] with transfer back to [facility].  1350 Returned to room 3117. Report given to EB Tolbert RN, Cl. Lead to follow up with MD. Of events.

## 2018-07-11 NOTE — PROGRESS NOTES
[]Hide copied text Bedside and Verbal shift change report given to nidhi RN (oncoming nurse) LOBO barber (offgoing nurse). Report included the following information SBAR, Kardex, Recent Results, Med Rec Status and Cardiac Rhythm SR. 
   
Zone Phone:   [phone] 
   
Significant changes during shift:  none 
   
Patient Information 
Lalo Reyes old, admitted on 7/7/18, patient of Dr. Christian De Paz, admitted from [facility]. 
   
Problem List 
   
   
       
Past Medical History:  
Diagnosis Date  Baker's cyst of knee     
 Depression     
 Hypertension     
 RA (rheumatoid arthritis) ([facility] [address]     
  
   
             
Past Surgical History:  
Procedure Laterality Date  COLONOSCOPY N/A 2/28/2017  
   COLONOSCOPY performed by Connie Soria MD at \[facility]\"" ENDOSCOPY  
 HX HERNIA REPAIR        
 HX UROLOGICAL        
   \"bladder tack\"    
   
   
Core Measures: 
   
CVA: No 
CHF: No 
PNA: No   
   
Activity Status: 
   
OOB to Chair: No 
Ambulated this shift No 
Bed Rest Yes 
   
Supplemental T5: (MY Applicable) 
   
0xygen 2 L NC PRN 
   
   
LINES AND DRAINS: 
   
PIV 
   
DVT prophylaxis: 
   
DVT prophylaxis Med- Yes; Lovenox DVT prophylaxis SCD or MARISSA- No  
   
Wounds: (If Applicable) 
   
Wounds- R forehead laceration   
Location Shruti Hutchins 
Patient Safety: 
   
Falls Score Total Score:  3 
Safety Level_______ Bed Alarm On? Yes Sitter?  No 
   
Plan for upcoming shift: MRCP  procedure 
   
Discharge Plan: TBD 
   
Active Consults:  GI

## 2018-07-11 NOTE — DISCHARGE SUMMARY
Hospitalist Discharge Summary     Patient ID:  Elaine Lennon  [id number]  78 y.o.  1940    PCP on record: Heydi Cheney MD    Admit date: 7/7/2018  Discharge date and time: 7/11/2018      DISCHARGE DIAGNOSIS:  See below    CONSULTATIONS:  IP CONSULT TO GASTROENTEROLOGY    Excerpted HPI from H&P of Fernanda Ely MD:  The pt was sent from the NH with complaints of chest pain. The pt had reportedly fallen about two days ago and since then she has had intermittent pain in her shoulders, arm and chest wall. The pt was noted to have tachycardia. It is also noted that the pt sustained facial injuries including laceration of her forehead (s/p repair) and other injuries after the GLF about two days ago.     ______________________________________________________________________  Ludy Sa SUMMARY/HOSPITAL COURSE:  for full details see H&P, daily progress notes, labs, consult notes. Sepsis possibly due to UTI, from [address] - resolved  Now on Levaquin based on culture. morgenela grew of urine cx. Lactic acid Nl, BP is Within Acceptable Range. WBC Within Acceptable Range. Afebrile. will wait for blood and urine cultures. Will continue to monitor vitals. 7/10:  Continue Levofloxacin - end date 7/13 7/11:  Pt stable for discharge. Will DC on Levoflox till completion on 7/13. Dysphagia , unspecified yet. s/p barium swallow   Report as below   Markedly tertiary wave activity involving the esophagus with significant  to-and-fro action of contrast within the esophagus and relatively poor emptying. GI Inputs and recommendations Appreciated. S/p ESOPHAGOGASTRODUODENOSCOPY (EGD). ESOPHAGEAL DILATION  ESOPHAGOGASTRODUODENAL (EGD) BIOPSY and ENDOSCOPIC BRUSHING.    7/10:  Pt still complaining of \"not being able to eat. \" But when questioned further, pt is able to swallow food.  At bedside it seems as if she is trying to bring up her food almost. Will continue following with GI - esophageal manometry is scheduled for noon tomorrow. 7/11:  Pt underwent manometry testing today. Pt to follow up with Dr. Regis Newell in 2 weeks to review the results. Noted Rapid Response called for the pt in the Endoscopy Unit. It seems pt complained of CP but was symptom free by the time the response team arrived. Pt also spoke with Dr. Regis Newell and asked him to delay her discharge. Therefore met pt and her family at bedside this afternoon and had an extensive discussion explaining what was done for the pt and what the plan going forward. Discussed how pt's anxiety often gets the best of her. Pt's family in complete agreement with this and state that she \"gets lots of panic attacks. \" Radha Glass over strategies to help to try and combat this. Feel the Rapid Response was called for a panic attack, and even the pt agrees with this. Pt stable to be discharged back to her LTC. Family and pt in agreement. Hypertension: continue the metoprolol     RA (rheumatoid arthritis) ([facility])  Humira Q week     Anxiety and Depression  Continue effexor and clonipine.     Wound on the forehead and bruising around the right eye , after a fall from a week ago before hospital admission. Eye drops and wound care.  _______________________________________________________________________  Patient seen and examined by me on discharge day. Pertinent Findings:  Gen:    Not in distress  Chest: Clear lungs  CVS:   Regular rhythm. No edema  Abd:  Soft, not distended, not tender  Neuro:  Alert, oriented x3  _______________________________________________________________________  DISCHARGE MEDICATIONS:   Current Discharge Medication List      START taking these medications    Details   levoFLOXacin (LEVAQUIN) 500 mg tablet Take 1 Tab by mouth daily.   Qty: 2 Tab, Refills: 0         CONTINUE these medications which have NOT CHANGED    Details   docusate sodium (COLACE) 100 mg capsule Take 100 mg by mouth two (2) times a day.      naloxone (NARCAN) 0.4 mg/mL injection 0.4 mg by IntraVENous route as needed. clonazePAM (KLONOPIN) 1 mg tablet Take 1 mg by mouth two (2) times a day. levothyroxine (SYNTHROID) 75 mcg tablet Take 37.5 mcg by mouth Daily (before breakfast). traZODone (DESYREL) 100 mg tablet Take 200 mg by mouth nightly. QUEtiapine (SEROQUEL) 100 mg tablet Take 50 mg by mouth two (2) times a day. HUMIRA PEN 40 mg/0.8 mL injection pen INJECT 40MG(1 PEN) UNDER SKIN EVERY WEEK. Qty: 4 Kit, Refills: 6      HYDROcodone-acetaminophen (NORCO) 5-325 mg per tablet Take  by mouth every six (6) hours as needed for Pain.      multivitamin (ONE A DAY) tablet Take 1 Tab by mouth daily. ferrous sulfate (IRON, FERROUS SULFATE,) 325 mg (65 mg iron) tablet Take 1 Tab by mouth two (2) times a day. Qty: 30 Tab, Refills: 0      pantoprazole (PROTONIX) 40 mg tablet Take 1 Tab by mouth Daily (before breakfast). Qty: 30 Tab, Refills: 0      venlafaxine-SR (EFFEXOR-XR) 150 mg capsule Take  by mouth daily. folic acid (FOLVITE) 1 mg tablet Take  by mouth daily. polyvinyl alcohol (LIQUIFILM TEARS) 1.4 % ophthalmic solution Administer 1 Drop to both eyes as needed. acetaminophen (TYLENOL) 325 mg tablet Take  by mouth every four (4) hours as needed for Pain.      lovastatin (MEVACOR) 40 mg tablet Take 40 mg by mouth daily. metoprolol (LOPRESSOR) 25 mg tablet Take 25 mg by mouth two (2) times a day. mirtazapine (REMERON) 45 mg tablet Take 45 mg by mouth nightly. prednisoLONE acetate (PRED FORTE) 1 % ophthalmic suspension Administer 1 Drop to left eye three (3) times daily. My Recommended Diet, Activity, Wound Care, and follow-up labs are listed in the patient's Discharge Insturctions which I have personally completed and reviewed.     ______________________________________________________________________    Risk of deterioration: Moderate    Condition at Discharge: Stable  ______________________________________________________________________    Disposition  SNF/LTC  ______________________________________________________________________    Care Plan discussed with:   Patient, Family, RN, Care Manager, Consultant    ______________________________________________________________________    Code Status: Full Code  ______________________________________________________________________      Follow up with:   PCP : Clem Herrera MD  Follow-up Information     Follow up With Details Comments Contact [place] [address] [address]      Clem Herrera, [address] [facility]      Lilliam Neville MD In 2 weeks  [address]  [phone]                Total time in minutes spent coordinating this discharge (includes going over instructions, follow-up, prescriptions, and preparing report for sign off to her PCP) :  45 minutes    Signed:  Maira Guadalupe Chaudhary MD

## 2018-07-11 NOTE — DISCHARGE INSTRUCTIONS

## 2018-07-13 LAB
BACTERIA SPEC CULT: NORMAL
BACTERIA SPEC CULT: NORMAL
SERVICE CMNT-IMP: NORMAL
SERVICE CMNT-IMP: NORMAL

## 2018-07-16 NOTE — OP NOTES
[address]  OPERATIVE REPORT    Richard Bello  MR#: [id number]  : 1940  ACCOUNT #: [de-identified]   DATE OF SERVICE: 2018    PROCEDURES PLANNED:   1. Esophageal manometry. 2.  Impedance esophageal manometry. PROCEDURE PERFORMED:  Esophageal manometry. PREOPERATIVE DIAGNOSES:   1. Dysphagia. 2.  Small hiatal hernia. POSTOPERATIVE DIAGNOSES:  1. Distal esophageal spasm. 2.  Small peristaltic breaks. 3.  Esophagogastric junction relaxes normally. 4.  Impedance manometry cannot be performed because of technical reasons. 5.  Small hiatal hernia. SPECIMENS REMOVED:  None. ANESTHESIA:  Topical.    ESTIMATED BLOOD LOSS:  None. COMPLICATIONS:  None. IMPLANTS:  None. ASSISTANT:  Jenny Damon RN. SURGEON/:  Mancil Bloch, MD     DESCRIPTION OF PROCEDURE:  High-resolution esophageal manometry was performed by the nursing staff with subsequent interpretation by Dr. Raji Limon. The lower esophageal sphincter is at 44.1 cm and for a distance of 1.9 cm. A small, 0.8 cm hiatal hernia is present. Wet swallows were then administered. Resting pressures are on the low side:  Respiratory minimum 2.3 (4.8-32),  respiratory mean 3.3 (13-43), residual after swallowing normal at 5.7, less than 15 is normal.    The upper esophageal sphincter pressure is not reported here. This is not a reliable test for measurement of or interpretation of clinical upper esophageal sphincter disorders. Wet swallows were then administered. By standard criteria, 6 are peristaltic, 1 is simultaneous and 3 are failed. Mean amplitude in the distal esophagus is normal at 55.4. The wave duration is normal at 3.8 seconds. There are 12% double-peaked waves, which is normal.  High-resolution scoring is normal and is as follows:  .5, contractile front velocity 4.7, intrabolus pressure to LES 1.4, intrabolus pressure average maximum body of the esophagus 12.3.     [facility] scoring is as follows:  Distal latency 4.5. There were 3 contractions with a distal latency less than 4.5. Percent failed, 20; percent pan esophageal pressurization, 10; percent premature, 30; percent large breaks, 0; percent small breaks, 80. Percent small breaks were first transition zone defects in the proximal esophagus (striated muscle) to the midesophagus (smooth muscle). FINAL DIAGNOSIS:  Distal esophageal spasm. No esophagogastric junction outflow obstruction, and 80% small breaks. Consider calcium channel blocker therapy. There is no specific treatment available for the small breaks.       Jose Miguel Toledo MD       RFK / PN  D: 07/16/2018 16:57     T: 07/16/2018 17:36  JOB #: [id number]  CC: Shamika Ziegler MD  CC: Jennifer Jack MD

## 2018-07-17 ENCOUNTER — PATIENT OUTREACH (OUTPATIENT)
Dept: CASE MANAGEMENT | Age: 78
End: 2018-07-17

## 2018-07-17 NOTE — PROGRESS NOTES
Community Care Team documentation for patient in [facility]  Initial Follow Up       Patient was admitted to [facility] on 7/7/18 and discharged 7/11/18 to [address] [facility] Discharge diagnosis:  Sepsis    RRAT score: 18    Advance Care Planning:  Not on file. PCP : Mahamed Zhang MD    SNF Attending:  Mahamed Zhang MD    Spoke with Sherry Brenner  and team at [facility]. Ensured patient arrived to SNF safely with admission packet in order. Patient is in LTC at [address] OT/SLP providing skilled therapy in SNF. Currently mod assist for upper body ADLs, max assist for lower body ADLs and toileting. Patient very anxious about swallowing. PT attempted evaluation, but patient too anxious about ambulating. Plan for transition back to LTC at [address] after skilled therapy ends. LOS TBD. Community Care Team will follow up weekly with [facility] until discharge. Medications were not reconciled and general patient assessment was not completed during this [facility] outreach.       Jose Roberto Al, MSN, RN, ACNS-BC, [facility]  Nurse Navigator, [employer] [phone]

## 2018-07-24 ENCOUNTER — PATIENT OUTREACH (OUTPATIENT)
Dept: CASE MANAGEMENT | Age: 78
End: 2018-07-24

## 2018-07-24 NOTE — PROGRESS NOTES
Community Care Team Documentation for Patient in [facility]  Subsequent Follow up     Patient remains at [facility] ([facility]). See previous [facility] Team notes. PCP : Sarahi Levine MD    Spoke with Kimi Greene and team at [place]. Patient is in LTC at [facility]. OT/SLP providing skilled therapy in SNF. Currently ambulating 100 ft with walker and Contact guard assist, and toileting with supervision. Psych following patient due to anxiety. Plan for transition back to LTC at [address] after skilled therapy ends. LOS TBD. Medications were not reconciled and general patient assessment was not completed during this skilled nursing facility outreach.      Tori Wu, MSN, RN, ACNS-BC, [facility]  Nurse Navigator, [employer] [phone]

## 2018-07-31 ENCOUNTER — PATIENT OUTREACH (OUTPATIENT)
Dept: CASE MANAGEMENT | Age: 78
End: 2018-07-31

## 2018-07-31 NOTE — PROGRESS NOTES
Community Care Team Documentation for Patient in [facility] Subsequent Follow up Patient remains at [facility] ([facility]). See previous [facility] Team notes. PCP and SNF Attending : Barbara Avina MD 
 
 
Spoke with Kumar Cameron and team at [facility]. Patient transitioned to LTC on 7/28 at SNF. Currently able to ambulate facility distances with walker. Psych following. Community Care Team will sign off at this time. Medications were not reconciled and general patient assessment was not completed during this skilled nursing facility outreach. Stefano Murray, MSN, RN, ACNS-BC, [facility] Nurse Navigator, [address] [place] [address] [phone]

## 2018-08-27 ENCOUNTER — HOSPITAL ENCOUNTER (OUTPATIENT)
Dept: INFUSION THERAPY | Age: 78
Discharge: HOME | End: 2018-08-27
Payer: COMMERCIAL

## 2018-08-27 VITALS — SYSTOLIC BLOOD PRESSURE: 99 MMHG | DIASTOLIC BLOOD PRESSURE: 66 MMHG | RESPIRATION RATE: 18 BRPM | TEMPERATURE: 98 F

## 2018-08-27 PROCEDURE — 74011250636 HC RX REV CODE- 250/636: Performed by: NURSE PRACTITIONER

## 2018-08-27 PROCEDURE — 74011000258 HC RX REV CODE- 258: Performed by: NURSE PRACTITIONER

## 2018-08-27 PROCEDURE — 96365 THER/PROPH/DIAG IV INF INIT: CPT

## 2018-08-27 RX ADMIN — FERUMOXYTOL 510 MG: 510 INJECTION INTRAVENOUS at 11:38

## 2018-08-27 NOTE — PROGRESS NOTES
[facility] OPIC VISIT NOTE    1030  Pt arrived at [facility] ambulatory and in no distress for Feraheme. Assessment completed, pt c/o right hip pain 10/10. PIV LAC 24 G . Positive blood return noted     Medications received:  Feraheme IV  Patient Vitals for the past 12 hrs:   Temp Resp BP   08/27/18 1215 98 °F (36.7 °C) 18 99/66   08/27/18 1037 97.8 °F (36.6 °C) 18 98/63     Tolerated treatment well, no adverse reaction noted. PIV removed gauze and band aid applied. Pt remained in opic for 20 minutes post infusion. 1215  D/C'd from [facility] ambulatory and in no distress accompanied by daughter. Next appointment is 9/3/18 at 1:00.

## 2018-08-29 ENCOUNTER — HOSPITAL ENCOUNTER (OUTPATIENT)
Dept: LAB | Age: 78
Discharge: HOME | End: 2018-08-29

## 2018-08-29 PROCEDURE — 88307 TISSUE EXAM BY PATHOLOGIST: CPT | Performed by: OPHTHALMOLOGY

## 2018-08-30 RX ORDER — SODIUM CHLORIDE 9 MG/ML
25 INJECTION, SOLUTION INTRAVENOUS CONTINUOUS
Status: DISPENSED | OUTPATIENT
Start: 2018-09-05 | End: 2018-09-06

## 2018-09-03 ENCOUNTER — APPOINTMENT (OUTPATIENT)
Dept: INFUSION THERAPY | Age: 78
End: 2018-09-03
Payer: COMMERCIAL

## 2018-09-05 ENCOUNTER — HOSPITAL ENCOUNTER (OUTPATIENT)
Dept: INFUSION THERAPY | Age: 78
Discharge: HOME | End: 2018-09-05
Payer: COMMERCIAL

## 2018-09-05 VITALS
SYSTOLIC BLOOD PRESSURE: 106 MMHG | OXYGEN SATURATION: 100 % | DIASTOLIC BLOOD PRESSURE: 68 MMHG | HEART RATE: 98 BPM | TEMPERATURE: 98.8 F | RESPIRATION RATE: 18 BRPM

## 2018-09-05 PROCEDURE — 74011000258 HC RX REV CODE- 258

## 2018-09-05 PROCEDURE — 96374 THER/PROPH/DIAG INJ IV PUSH: CPT

## 2018-09-05 PROCEDURE — 74011250636 HC RX REV CODE- 250/636

## 2018-09-05 RX ADMIN — FERUMOXYTOL 510 MG: 510 INJECTION INTRAVENOUS at 15:12

## 2018-09-05 NOTE — PROGRESS NOTES
Outpatient Infusion Center Progress Note [id number] [phone] Pt admit to [facility] for 2/2 Feraheme arrived via [employer] in stable condition. Accompanied by supportive daughter. Assessment completed. No new concerns voiced. #24 G PIV established in right AC, positive blood return. Medications: 
Normal saline Adolph Cornea Feraheme 510 MG - infused over 15 minutes Patient observed for 30 minutes post infusion with no reactions noted. PIV then flushed and removed at conclusion. Site covered with 2x2 guaze and coban. 1600 Pt tolerated treatment well. D/c home via [employer] in no distress. Accompanied by daughter. Pt completed treatment today and there are no future appointments at this time. Patient Vitals for the past 12 hrs: 
 Temp Pulse Resp BP SpO2  
09/05/18 1555 - 98 18 106/68 -  
09/05/18 1416 98.8 °F (37.1 °C) 94 20 98/65 100 %

## 2018-10-03 ENCOUNTER — HOSPITAL ENCOUNTER (OUTPATIENT)
Dept: INFUSION THERAPY | Age: 78
Discharge: HOME | End: 2018-10-03
Payer: MEDICARE

## 2018-10-03 VITALS
SYSTOLIC BLOOD PRESSURE: 112 MMHG | RESPIRATION RATE: 18 BRPM | DIASTOLIC BLOOD PRESSURE: 71 MMHG | HEART RATE: 104 BPM | TEMPERATURE: 97.6 F

## 2018-10-03 PROCEDURE — 86900 BLOOD TYPING SEROLOGIC ABO: CPT | Performed by: NURSE PRACTITIONER

## 2018-10-03 PROCEDURE — 86923 COMPATIBILITY TEST ELECTRIC: CPT | Performed by: NURSE PRACTITIONER

## 2018-10-03 PROCEDURE — 36415 COLL VENOUS BLD VENIPUNCTURE: CPT | Performed by: NURSE PRACTITIONER

## 2018-10-03 RX ORDER — ACETAMINOPHEN 325 MG/1
650 TABLET ORAL ONCE
Status: DISCONTINUED | OUTPATIENT
Start: 2018-10-04 | End: 2018-10-04

## 2018-10-03 RX ORDER — DIPHENHYDRAMINE HCL 25 MG
25 CAPSULE ORAL ONCE
Status: COMPLETED | OUTPATIENT
Start: 2018-10-04 | End: 2018-10-04

## 2018-10-04 ENCOUNTER — HOSPITAL ENCOUNTER (OUTPATIENT)
Dept: INFUSION THERAPY | Age: 78
Discharge: HOME | End: 2018-10-04
Payer: MEDICARE

## 2018-10-04 VITALS
OXYGEN SATURATION: 100 % | DIASTOLIC BLOOD PRESSURE: 67 MMHG | SYSTOLIC BLOOD PRESSURE: 118 MMHG | HEART RATE: 105 BPM | RESPIRATION RATE: 18 BRPM | TEMPERATURE: 97.7 F

## 2018-10-04 PROCEDURE — 74011250636 HC RX REV CODE- 250/636

## 2018-10-04 PROCEDURE — 74011250637 HC RX REV CODE- 250/637

## 2018-10-04 PROCEDURE — 77030013169 SET IV BLD ICUM -A

## 2018-10-04 PROCEDURE — 36430 TRANSFUSION BLD/BLD COMPNT: CPT

## 2018-10-04 PROCEDURE — P9016 RBC LEUKOCYTES REDUCED: HCPCS | Performed by: NURSE PRACTITIONER

## 2018-10-04 RX ORDER — SODIUM CHLORIDE 0.9 % (FLUSH) 0.9 %
5-10 SYRINGE (ML) INJECTION AS NEEDED
Status: ACTIVE | OUTPATIENT
Start: 2018-10-04 | End: 2018-10-05

## 2018-10-04 RX ORDER — SODIUM CHLORIDE 9 MG/ML
250 INJECTION, SOLUTION INTRAVENOUS AS NEEDED
Status: DISPENSED | OUTPATIENT
Start: 2018-10-04 | End: 2018-10-04

## 2018-10-04 RX ADMIN — ACETAMINOPHEN 650 MG: 650 SOLUTION ORAL at 09:25

## 2018-10-04 RX ADMIN — DIPHENHYDRAMINE HYDROCHLORIDE 25 MG: 25 CAPSULE ORAL at 09:25

## 2018-10-04 RX ADMIN — SODIUM CHLORIDE 250 ML: 900 INJECTION, SOLUTION INTRAVENOUS at 09:30

## 2018-10-04 NOTE — PROGRESS NOTES
Problem: Anemia Care Plan (Adult and Pediatric) Goal: *Labs within defined limits Outcome: Progressing Towards Goal 
Blood Transfusion. Discussed purpose, procedure, and possibility of reaction. Pt and brother verbalize understanding.

## 2018-10-04 NOTE — PROGRESS NOTES
0840 Pt arrived at [facility] via stretcher with medical transport and in no acute distress for transfusion of 2 units PRBCs. Assessment completed. Pt reports constant left knee pain. 22 gauge IV established in left wrist without difficulty. Signs/symptoms of adverse blood reaction discussed with pt, voiced understanding. Medications received: 
NS @ [facility] Tylenol 650 mg po Benadryl 25 mg po Patient Vitals for the past 12 hrs: 
 Temp Pulse Resp BP SpO2  
10/04/18 1455 97.7 °F (36.5 °C) (!) 105 18 118/67 -  
10/04/18 1400 97.8 °F (36.6 °C) (!) 101 18 102/58 -  
10/04/18 1300 98 °F (36.7 °C) (!) 101 18 102/56 -  
10/04/18 1230 97.9 °F (36.6 °C) (!) 103 18 101/52 -  
10/04/18 1215 97.8 °F (36.6 °C) (!) 110 18 115/80 -  
10/04/18 1153 97.8 °F (36.6 °C) 97 18 106/52 -  
10/04/18 1130 97.5 °F (36.4 °C) 100 18 (!) 88/34 -  
10/04/18 1030 98.2 °F (36.8 °C) (!) 101 18 (!) 101/37 -  
10/04/18 1000 97.2 °F (36.2 °C) (!) 102 18 109/41 -  
10/04/18 0945 97.8 °F (36.6 °C) 95 18 (!) 108/38 -  
10/04/18 0925 97.8 °F (36.6 °C) (!) 106 18 109/40 -  
10/04/18 0900 98 °F (36.7 °C) (!) 107 18 103/41 100 % 0930:  1st unit PRBCs started and infusing without difficulty, observed x 15 minutes 1143:  1st unit completed without adverse reaction noted, NS flushing line. 1200:  2nd unit PRBCs started and infusing without difficulty, observed x 15 minutes 1405:  2nd unit completed without adverse reaction noted, NS flushing line. 1510 Tolerated transfusion  well, no adverse reaction noted. D/C instructions reviewed, copy to pt, voiced understanding. Patient declined 1 hour post transfusion observation. D/Cd from [facility] via stretcher and in no acute distress accompanied by transport and brother. Pt to follow-up with referring.

## 2018-10-05 LAB
ABO + RH BLD: NORMAL
BLD PROD TYP BPU: NORMAL
BLD PROD TYP BPU: NORMAL
BLOOD GROUP ANTIBODIES SERPL: NORMAL
BPU ID: NORMAL
BPU ID: NORMAL
CROSSMATCH RESULT,%XM: NORMAL
CROSSMATCH RESULT,%XM: NORMAL
SPECIMEN EXP DATE BLD: NORMAL
STATUS OF UNIT,%ST: NORMAL
STATUS OF UNIT,%ST: NORMAL
UNIT DIVISION, %UDIV: 0
UNIT DIVISION, %UDIV: 0

## 2018-11-27 ENCOUNTER — OFFICE VISIT (OUTPATIENT)
Dept: RHEUMATOLOGY | Age: 78
End: 2018-11-27

## 2018-11-27 VITALS
BODY MASS INDEX: 24.87 KG/M2 | RESPIRATION RATE: 16 BRPM | HEART RATE: 90 BPM | WEIGHT: 136 LBS | TEMPERATURE: 97.4 F | OXYGEN SATURATION: 92 %

## 2018-11-27 DIAGNOSIS — M17.0 PRIMARY OSTEOARTHRITIS OF BOTH KNEES: ICD-10-CM

## 2018-11-27 DIAGNOSIS — M05.79 SEROPOSITIVE RHEUMATOID ARTHRITIS OF MULTIPLE SITES (HCC): Primary | ICD-10-CM

## 2018-11-27 NOTE — PROGRESS NOTES
RHEUMATOLOGY PROBLEM LIST AND CHIEF COMPLAINT  1. Rheumatoid arthritis (1995) - polyarthritis, significant deformity of multiple joints with subluxation, positive JOAN (1:160), positive CCP, positive rheumatoid factor    Therapy History:  Prior DMARDs: Remicade (allergy), hydroxychloroquine (not effective)  Current DMARDs: Humira (stopped in 2010, 2015-04/2016, 1/2017-current), methotrexate (current)    2. Osteoarthritis     INTERVAL HISTORY  This is a 78 y.o.  female. Today, the patient complains of pain in the joints. Location: feet, knees, shoulder, ankle  Severity:  5 on a scale of 0-10  Timing: all day   Duration:  2 months  Modifying factors: Humira, Methotrexate  Context/Associated signs and symptoms: The patient was last seen August 2017. She continues with Humira 40 mg weekly. Via her brother, she has complaints of limited shoulder range of motion and difficulty eating due to esophageal narrowing. She is unable to move her left knee due to severe osteoarthritis, swelling and pain. She is also extremely anxious and crying uncontrollably today. Her brother states the anxiety, distress, and delirium has worsened since she was on steroids one month ago. Her other symptoms are forgetfulness and a unfounded belief that Candida Garcia will be going to prison\". She was briefly put on anti-psychotic drugs PRN, but is not taking currently these medications.     RHEUMATOLOGY REVIEW OF SYSTEMS   Positives as per history  Negatives as follows:  Cloyce Confer:  Denies unexplained persistent fevers or weight change  RESPIRATORY:  No pleuritic pain, exertional dyspnea  CARDIOVASCULAR:  Denies chest pain  GASTRO:   Denies heartburn, abdominal pain, nausea, vomiting, diarrhea  SKIN:    Denies rash   MSK:    No morning stiffness >1 hour    PAST MEDICAL HISTORY  Reviewed with patient, significant changes in medical history - no    FAMILY HISTORY  rheumatoid arthritis     PHYSICAL EXAM  Pulse 90, temperature 97.4 °F (36.3 °C), temperature source Oral, resp. rate 16, weight 136 lb (61.7 kg), SpO2 92 %. GENERAL APPEARANCE: Well-nourished. Wheelchair, acute distress secondary to anxiety  NECK: No adenopathy, decreased ROM  ENT: No oral ulcers  CARDIOVASCULAR: Heart rhythm is regular. No murmur, rub, gallop  CHEST: Normal vesicular breath sounds. No wheezes, rales, pleural friction rubs  ABDOMINAL: The abdomen is soft and nontender. Bowel sounds are normal  SKIN: No rash, palpable purpura, digital ulcer, abnormal thickening   NEUROLOGICAL:  MUSCULOSKELETAL:   Upper extremities - ulnar deviation with boutonniere deformity and swan-neck deformity of bilateral hands. Subluxation of joints. Heberden's and Vivi's nodes noted. Wrist decreased ROM B/L. R wrist warmth, with tenderness. Lower extremities - L knee mild effusion, warmth, bony prominence, tenderness. B/L ankles mild swelling. LABS, RADIOLOGY AND PROCEDURES   Previous labs reviewed    10/2018  ESR >100    ASSESSMENT  1. Rheumatoid arthritis (Established problem -  Progressive disease) - This appears to be stable today. She should continue with Humira 40 mg weekly. Her other symptoms today of severe knee pain, acute distress, and delirium are of more concern today. She should return in 3 months for a follow up. 2. 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 She is wearing sunglasses today. 3. Osteoarthritis - She has almost no range of motion in her left knee, and she is in severe pain today. I will give her a right knee injection today, with the understanding that her symptoms of delirium could briefly worsen. This is a better option then oral steroids. OTC meds and tramadol are recommended. 4. Bone density - bone scan ordered in 12/2015 but has not been performed, she currently uses vitamin D supplementation.   5. Mild senile dementia - paranoia, psychosis, anxiety (New problem - Progressive disease) - Her symptoms today are consistent with the onset of mild senile dementia. I advised her brother on possible avenues for treatment. PLAN  1. Humira 40 mg weekly  2. L knee injection  3. Return in e months      Ada Vital MD  Adult and Pediatric Rheumatology     [facility] [address], Phone [phone], Fax [phone]   E-mail: [email or web address]    Visiting  of Pediatrics    Department of Pediatrics, [facility] [address], [address] [place] [address], Phone [phone], Fax [phone]  E-mail: [email or web address]    There are no Patient Instructions on file for this visit. cc:  Marylen Reasoner, MD    Written by blanco Joseph, as dictated by Niels Erickson. DEBI Logan reviewed for the following:   Piper LIVINGSTON, have reviewed the History, Physical and updated the Allergic reactions for June [facility] performed immediately prior to start of procedure:   Piper LIVINGSTON, have performed the following reviews on June Williams prior to the start of the procedure:            * Patient was identified by name and date of birth   * Agreement on procedure being performed was verified  * Risks and Benefits explained to the patient  * Procedure site verified and marked as necessary  * Patient was positioned for comfort  * Consent was signed and verified     Time: 2:50      Date of procedure: 11/27/2018    Procedure performed by: Oliver Donis MD    Provider assisted by: none    Patient assisted by: self    How tolerated by patient: tolerated the procedure well with no complications    Post Procedural Pain Scale: 0 - No Hurt    Comments: none    PROCEDURE  After consent was obtained, using sterile technique the Left knee was prepped and Kenalog 40 mg and 1ml of lidocaine was then injected and the needle withdrawn. The procedure was well tolerated. The patient is asked to continue to rest for 24 hours before resuming regular activities. It may be more painful for the first 1-2 days. Watch for fever, or increased swelling or persistent pain. Call or return to clinic prn if such symptoms occur.

## 2018-12-10 RX ORDER — LIDOCAINE HYDROCHLORIDE 10 MG/ML
1 INJECTION, SOLUTION EPIDURAL; INFILTRATION; INTRACAUDAL; PERINEURAL ONCE
Qty: 1 ML | Refills: 0
Start: 2018-12-10 | End: 2018-12-10

## 2018-12-10 RX ORDER — TRIAMCINOLONE ACETONIDE 40 MG/ML
40 INJECTION, SUSPENSION INTRA-ARTICULAR; INTRAMUSCULAR ONCE
Qty: 1 ML | Refills: 0
Start: 2018-12-10 | End: 2018-12-10

## 2019-02-20 ENCOUNTER — OFFICE VISIT (OUTPATIENT)
Dept: RHEUMATOLOGY | Age: 79
End: 2019-02-20

## 2019-02-20 VITALS
BODY MASS INDEX: 26.52 KG/M2 | TEMPERATURE: 97.6 F | RESPIRATION RATE: 16 BRPM | HEART RATE: 97 BPM | DIASTOLIC BLOOD PRESSURE: 61 MMHG | SYSTOLIC BLOOD PRESSURE: 102 MMHG | WEIGHT: 145 LBS | OXYGEN SATURATION: 97 %

## 2019-02-20 DIAGNOSIS — M05.79 SEROPOSITIVE RHEUMATOID ARTHRITIS OF MULTIPLE SITES (HCC): Primary | ICD-10-CM

## 2019-02-20 RX ORDER — CLOMIPRAMINE HYDROCHLORIDE 75 MG/1
150 CAPSULE ORAL
COMMUNITY
Start: 2019-02-01 | End: 2019-07-19

## 2019-02-20 RX ORDER — DOXYCYCLINE HYCLATE 100 MG
TABLET ORAL
COMMUNITY
Start: 2018-12-28 | End: 2019-07-16

## 2019-02-20 RX ORDER — ATORVASTATIN CALCIUM 10 MG/1
10 TABLET, FILM COATED ORAL DAILY
COMMUNITY
Start: 2019-01-09

## 2019-02-20 RX ORDER — DILTIAZEM HYDROCHLORIDE 180 MG/1
180 CAPSULE, COATED, EXTENDED RELEASE ORAL DAILY
COMMUNITY
Start: 2019-02-12

## 2019-02-20 RX ORDER — IPRATROPIUM BROMIDE AND ALBUTEROL SULFATE 2.5; .5 MG/3ML; MG/3ML
SOLUTION RESPIRATORY (INHALATION)
COMMUNITY
Start: 2019-02-07 | End: 2019-07-16

## 2019-02-20 RX ORDER — DESOXIMETASONE 2.5 MG/G
CREAM TOPICAL
COMMUNITY
Start: 2019-01-04 | End: 2019-07-16

## 2019-02-20 RX ORDER — NAPROXEN 500 MG/1
TABLET ORAL
COMMUNITY
Start: 2018-12-23 | End: 2019-07-16

## 2019-02-20 NOTE — PROGRESS NOTES
RHEUMATOLOGY PROBLEM LIST AND CHIEF COMPLAINT  1. Rheumatoid arthritis (1995) - polyarthritis, significant deformity of multiple joints with subluxation, positive JOAN (1:160), positive CCP, positive rheumatoid factor    Therapy History:  Prior DMARDs: Remicade (allergy), hydroxychloroquine (not effective)  Current DMARDs: Humira (stopped in 2010, 2015-04/2016, 1/2017-current), methotrexate (current)    2. Osteoarthritis     INTERVAL HISTORY  This is a 70 y.o.  female. Today, the patient complains of pain in the joints. Location: knee  Severity:  9 on a scale of 0-10  Timing: all day   Duration:  3 months  Modifying factors: Humira, Methotrexate  Context/Associated signs and symptoms: Via her daughter, the patient's left knee from last visit provided 2 months of relief. Today she complains of severe pain in her left knee and left arm. She has virtually no range of motion in her left knee. She continues with Humira 40 mg weekly. She is currently taking the maximum dose of pain medicine possible at her nursing home.     RHEUMATOLOGY REVIEW OF SYSTEMS   Positives as per history  Negatives as follows:  Shorty Saldivar:  Denies unexplained persistent fevers, weight change, chronic fatigue  HEAD/EYES:   Denies eye redness, blurry vision or sudden loss of vision, dry eyes, HA, temporal artery pain  ENT:    Denies oral/nasal ulcers, recurrent sinus infections, dry mouth  RESPIRATORY:  No pleuritic pain, history of pleural effusions, hemoptysis, exertional dyspnea  CARDIOVASCULAR:  Denies chest pain, history of pericardial effusions  GASTRO:   Denies heartburn, esophageal dysmotility, abdominal pain, nausea, vomiting, diarrhea, blood in the stool  HEMATOLOGIC:  No easy bruising, purpura, swollen lymph nodes  SKIN:    Denies alopecia, ulcers, nodules, sun sensitivity, unexplained persistent rash   VASCULAR:   Denies edema, cyanosis, raynaud phenomenon  NEUROLOGIC:  Denies specific muscle weakness, paresthesias PSYCHIATRIC:  No sleep disturbance / snoring, depression, anxiety  MSK:    No morning stiffness >1 hour, SI joint pain, persistent joint swelling    PAST MEDICAL HISTORY  Reviewed with patient, significant changes in medical history - no    FAMILY HISTORY  rheumatoid arthritis     PHYSICAL EXAM  Blood pressure 102/61, pulse 97, temperature 97.6 °F (36.4 °C), temperature source Oral, resp. rate 16, weight 145 lb (65.8 kg), SpO2 97 %. GENERAL APPEARANCE: Well-nourished. Wheelchair, acute distress secondary to anxiety  NECK: No adenopathy, decreased ROM  ENT: No oral ulcers  CARDIOVASCULAR: Heart rhythm is regular. No murmur, rub, gallop  CHEST: Normal vesicular breath sounds. No wheezes, rales, pleural friction rubs  ABDOMINAL: The abdomen is soft and nontender. Bowel sounds are normal  SKIN: No rash, palpable purpura, digital ulcer, abnormal thickening   NEUROLOGICAL:  MUSCULOSKELETAL:   Upper extremities - ulnar deviation with boutonniere deformity and swan-neck deformity of bilateral hands. Subluxation of joints. Heberden's and Vivi's nodes noted. Wrist decreased ROM B/L. R wrist warmth, with tenderness. Lower extremities - B/L ankles mild swelling. L knee bony prominence completely contracted, no ROM, warmth, effusion, no synovitis. LABS, RADIOLOGY AND PROCEDURES   Previous labs reviewed    10/2018  ESR >100    ASSESSMENT  1. Rheumatoid arthritis (Established problem -  Progressive disease) - This appears to be stable today. She should continue with Humira 40 mg weekly. Her other symptoms today of severe knee pain, acute distress are of more concern today. She should return in 3 months for a follow up. 2. 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 She is wearing sunglasses today. 3. Osteoarthritis - She has almost no range of motion in her left knee, and she is in severe pain today.  I will give her a left knee injection today, with the understanding that her symptoms of delirium could briefly worsen. This is a better option then oral steroids. I will check x-ray of the knee today. OTC meds and tramadol are recommended. 4. Bone density - bone scan ordered in 12/2015 but has not been performed, she currently uses vitamin D supplementation. 5. Mild senile dementia - paranoia, psychosis, anxiety (New problem - Progressive disease) - This has improved significantly since her last visit, however her daughter states this continues to be an issue. PLAN  1. Humira 40 mg weekly  2. L knee injection  3. Return in 3 months    Ada Leiva Sa, MD  Adult and Pediatric Rheumatology     [address], Phone [phone], Fax [phone]   E-mail: [email or web address]    Visiting  of Pediatrics    Department of Pediatrics, [facility] of [address] [place] [address], Phone [phone], Fax [phone]  E-mail: [email or web address]    There are no Patient Instructions on file for this visit. cc:  Мария Mayen MD    Written by blanco Wetzel, as dictated by Zackery Egan. Abbie Nunez M.D.    Nils Osler reviewed for the following:   Alex LIVINGSTON, have reviewed the History, Physical and updated the Allergic reactions for June [facility] performed immediately prior to start of procedure:   Alex LIVINGSTON, have performed the following reviews on June Williams prior to the start of the procedure:            * Patient was identified by name and date of birth   * Agreement on procedure being performed was verified  * Risks and Benefits explained to the patient  * Procedure site verified and marked as necessary  * Patient was positioned for comfort  * Consent was signed and verified     Time: 3:15      Date of procedure: 2/20/2019    Procedure performed by:   Kanika Glass MD    Provider assisted by: none    Patient assisted by: self    How tolerated by patient: tolerated the procedure well with no complications    Post Procedural Pain Scale: 0 - No Hurt    Comments: none    PROCEDURE  After consent was obtained, using sterile technique the left knee was prepped and Kenalog 40 mg and 1ml of lidocaine was then injected and the needle withdrawn. The procedure was well tolerated. The patient is asked to continue to rest for 24 hours before resuming regular activities. It may be more painful for the first 1-2 days. Watch for fever, or increased swelling or persistent pain. Call or return to clinic prn if such symptoms occur.

## 2019-03-04 ENCOUNTER — TELEPHONE (OUTPATIENT)
Dept: RHEUMATOLOGY | Age: 79
End: 2019-03-04

## 2019-03-04 NOTE — TELEPHONE ENCOUNTER
Spoke to pt Daughter Sudhafrancisca Pugh informed Sudha Pugh that Dr Enedina Mariee had not received the xray of her moms knee that was done [facility] where mom resides, pt daughter stated that she will have the facility to fax over the xray results, pt daughter verbally acknowledged understanding

## 2019-03-04 NOTE — TELEPHONE ENCOUNTER
----- Message from Phoenix Abarca MD sent at 3/4/2019  7:58 AM EST -----  Regarding: RE: Dr Cortez/telephone  I dont have the results of the x-ray. Ask where it was done.  ----- Message -----  From: Kimberly Saul LPN  Sent: 1/5/5974   1:31 PM  To: Phoenix Abarca MD  Subject: FW: Dr Cortez/telephone                               ----- Message -----  From: Trey Rose RN  Sent: 3/1/2019  12:03 PM  To: Leah Cline LPN  Subject: FW: Dr Cortez/telephone                               ----- Message -----  From: Deb Barrow  Sent: 3/1/2019  11:57 AM  To: [facility] Nurse Fish  Subject: Dr Mitzi Reyes                               Pt's daughter Brayden Harrison would like to speak to the doctor regarding the Xray on pt's Knee, that the doctor has gotten, please call [phone].

## 2019-03-05 ENCOUNTER — TELEPHONE (OUTPATIENT)
Dept: RHEUMATOLOGY | Age: 79
End: 2019-03-05

## 2019-03-05 NOTE — TELEPHONE ENCOUNTER
----- Message from Janeen Sandoval sent at 3/5/2019  9:00 AM EST -----  Regarding: Dr. Youtl Lynne (pt's daughter) is requesting a call back from Dr. José Luis Fuller or nurse in reference to knee x-ray results. Reva best contact [phone].

## 2019-03-08 ENCOUNTER — TELEPHONE (OUTPATIENT)
Dept: RHEUMATOLOGY | Age: 79
End: 2019-03-08

## 2019-03-08 NOTE — TELEPHONE ENCOUNTER
Patient's daughter would like a return call about the xray results and discuss if she needs surgery.

## 2019-03-08 NOTE — TELEPHONE ENCOUNTER
Spoke to pt daughter informed pt daughter per Dr Leidy Valdovinos that pt xray shows osteo arthritis, pt daughter wanted to know if pt mom would be a good candidate for knee replacement surgery per Dr Leidy Valdovinos pt would need a medical clearance from the PCP, pt daughter verbally acknowledged understanding

## 2019-03-13 RX ORDER — LIDOCAINE HYDROCHLORIDE 10 MG/ML
1 INJECTION INFILTRATION; PERINEURAL ONCE
Qty: 1 VIAL | Refills: 0
Start: 2019-03-13 | End: 2019-03-13

## 2019-03-13 RX ORDER — TRIAMCINOLONE ACETONIDE 40 MG/ML
40 INJECTION, SUSPENSION INTRA-ARTICULAR; INTRAMUSCULAR ONCE
Qty: 1 ML | Refills: 0
Start: 2019-03-13 | End: 2019-03-13

## 2019-06-07 ENCOUNTER — TELEPHONE (OUTPATIENT)
Dept: RHEUMATOLOGY | Age: 79
End: 2019-06-07

## 2019-06-07 NOTE — TELEPHONE ENCOUNTER
----- Message from Akhil Alva RN sent at 6/7/2019 12:50 PM EDT -----  Regarding: FW: Dr. Schafer [place]      ----- Message -----  From: Heber Pinto  Sent: 6/7/2019  12:31 PM  To: [facility] Nurse Pool  Subject: Dr. Schafer [place]                              [facility]) is requesting for medication \"Humira 40mg\" injectors sent to [address] [phone] (info on file). Franny best contact [phone].

## 2019-06-11 ENCOUNTER — TELEPHONE (OUTPATIENT)
Dept: RHEUMATOLOGY | Age: 79
End: 2019-06-11

## 2019-06-11 NOTE — TELEPHONE ENCOUNTER
----- Message from Kendy Bowser RN sent at 6/7/2019  4:14 PM EDT -----  Regarding: FW: Dr. Wiley Garces      ----- Message -----  From: Keke Arevalo  Sent: 6/7/2019   4:05 PM  To: [facility] Nurse Pool  Subject: Dr. Wiley Garces                              Ms. Nabor Novak, returning a miss call in regards to pt's medication \"Humara\" being sent to [facility]. Requesting to speak with the nurse in regards to this matter.   Best contact number [phone]

## 2019-06-11 NOTE — TELEPHONE ENCOUNTER
Spoke to Franny at [facility] informed Franny that the pt Humira was sent to [facility] stated that she was contacted by [facility] they informed her that the medication will be delivered, I verbally acknowledged understanding

## 2019-07-16 ENCOUNTER — APPOINTMENT (OUTPATIENT)
Dept: GENERAL RADIOLOGY | Age: 79
DRG: 871 | End: 2019-07-16
Attending: EMERGENCY MEDICINE
Payer: MEDICARE

## 2019-07-16 ENCOUNTER — APPOINTMENT (OUTPATIENT)
Dept: CT IMAGING | Age: 79
DRG: 871 | End: 2019-07-16
Attending: INTERNAL MEDICINE
Payer: MEDICARE

## 2019-07-16 ENCOUNTER — HOSPITAL ENCOUNTER (INPATIENT)
Age: 79
LOS: 3 days | Discharge: NURSING, REHAB OR LONG-TERM CARE | DRG: 871 | End: 2019-07-19
Attending: EMERGENCY MEDICINE | Admitting: INTERNAL MEDICINE
Payer: MEDICARE

## 2019-07-16 DIAGNOSIS — A41.9 SEPSIS, DUE TO UNSPECIFIED ORGANISM: Primary | ICD-10-CM

## 2019-07-16 DIAGNOSIS — N39.0 ACUTE UTI: ICD-10-CM

## 2019-07-16 PROBLEM — R65.20 SEVERE SEPSIS (HCC): Status: ACTIVE | Noted: 2019-07-16

## 2019-07-16 LAB
ALBUMIN SERPL-MCNC: 2.4 G/DL (ref 3.5–5)
ALBUMIN/GLOB SERPL: 0.5 {RATIO} (ref 1.1–2.2)
ALP SERPL-CCNC: 122 U/L (ref 45–117)
ALT SERPL-CCNC: 16 U/L (ref 12–78)
ANION GAP SERPL CALC-SCNC: 7 MMOL/L (ref 5–15)
ANION GAP SERPL CALC-SCNC: 8 MMOL/L (ref 5–15)
APPEARANCE UR: ABNORMAL
AST SERPL-CCNC: 15 U/L (ref 15–37)
ATRIAL RATE: 107 BPM
BACTERIA URNS QL MICRO: ABNORMAL /HPF
BASOPHILS # BLD: 0 K/UL (ref 0–0.1)
BASOPHILS NFR BLD: 0 % (ref 0–1)
BILIRUB SERPL-MCNC: 0.2 MG/DL (ref 0.2–1)
BILIRUB UR QL CFM: POSITIVE
BUN SERPL-MCNC: 18 MG/DL (ref 6–20)
BUN SERPL-MCNC: 33 MG/DL (ref 6–20)
BUN/CREAT SERPL: 63 (ref 12–20)
BUN/CREAT SERPL: 64 (ref 12–20)
CALCIUM SERPL-MCNC: 7.7 MG/DL (ref 8.5–10.1)
CALCIUM SERPL-MCNC: 8.6 MG/DL (ref 8.5–10.1)
CALCULATED R AXIS, ECG10: -37 DEGREES
CHLORIDE SERPL-SCNC: 100 MMOL/L (ref 97–108)
CHLORIDE SERPL-SCNC: 85 MMOL/L (ref 97–108)
CO2 SERPL-SCNC: 25 MMOL/L (ref 21–32)
CO2 SERPL-SCNC: 31 MMOL/L (ref 21–32)
COLOR UR: ABNORMAL
CREAT SERPL-MCNC: 0.28 MG/DL (ref 0.55–1.02)
CREAT SERPL-MCNC: 0.52 MG/DL (ref 0.55–1.02)
DIAGNOSIS, 93000: NORMAL
DIFFERENTIAL METHOD BLD: ABNORMAL
EOSINOPHIL # BLD: 0 K/UL (ref 0–0.4)
EOSINOPHIL NFR BLD: 0 % (ref 0–7)
EPITH CASTS URNS QL MICRO: ABNORMAL /LPF
ERYTHROCYTE [DISTWIDTH] IN BLOOD BY AUTOMATED COUNT: 13.8 % (ref 11.5–14.5)
GLOBULIN SER CALC-MCNC: 5 G/DL (ref 2–4)
GLUCOSE BLD STRIP.AUTO-MCNC: 110 MG/DL (ref 65–100)
GLUCOSE BLD STRIP.AUTO-MCNC: 116 MG/DL (ref 65–100)
GLUCOSE BLD STRIP.AUTO-MCNC: 120 MG/DL (ref 65–100)
GLUCOSE SERPL-MCNC: 101 MG/DL (ref 65–100)
GLUCOSE SERPL-MCNC: 124 MG/DL (ref 65–100)
GLUCOSE UR STRIP.AUTO-MCNC: NEGATIVE MG/DL
HCT VFR BLD AUTO: 27.3 % (ref 35–47)
HEMOCCULT STL QL: NEGATIVE
HGB BLD-MCNC: 9.4 G/DL (ref 11.5–16)
HGB UR QL STRIP: ABNORMAL
IMM GRANULOCYTES # BLD AUTO: 0.1 K/UL (ref 0–0.04)
IMM GRANULOCYTES NFR BLD AUTO: 1 % (ref 0–0.5)
KETONES UR QL STRIP.AUTO: NEGATIVE MG/DL
LACTATE BLD-SCNC: 1.47 MMOL/L (ref 0.4–2)
LEUKOCYTE ESTERASE UR QL STRIP.AUTO: ABNORMAL
LIPASE SERPL-CCNC: 149 U/L (ref 73–393)
LYMPHOCYTES # BLD: 1.1 K/UL (ref 0.8–3.5)
LYMPHOCYTES NFR BLD: 12 % (ref 12–49)
MCH RBC QN AUTO: 31.6 PG (ref 26–34)
MCHC RBC AUTO-ENTMCNC: 34.4 G/DL (ref 30–36.5)
MCV RBC AUTO: 91.9 FL (ref 80–99)
MONOCYTES # BLD: 0.7 K/UL (ref 0–1)
MONOCYTES NFR BLD: 7 % (ref 5–13)
NEUTS SEG # BLD: 7.8 K/UL (ref 1.8–8)
NEUTS SEG NFR BLD: 80 % (ref 32–75)
NITRITE UR QL STRIP.AUTO: NEGATIVE
NRBC # BLD: 0 K/UL (ref 0–0.01)
NRBC BLD-RTO: 0 PER 100 WBC
PH UR STRIP: 8 [PH] (ref 5–8)
PLATELET # BLD AUTO: 384 K/UL (ref 150–400)
PMV BLD AUTO: 9.7 FL (ref 8.9–12.9)
POTASSIUM SERPL-SCNC: 3.5 MMOL/L (ref 3.5–5.1)
POTASSIUM SERPL-SCNC: 4.2 MMOL/L (ref 3.5–5.1)
PROT SERPL-MCNC: 7.4 G/DL (ref 6.4–8.2)
PROT UR STRIP-MCNC: NEGATIVE MG/DL
Q-T INTERVAL, ECG07: 334 MS
QRS DURATION, ECG06: 74 MS
QTC CALCULATION (BEZET), ECG08: 458 MS
RBC # BLD AUTO: 2.97 M/UL (ref 3.8–5.2)
RBC #/AREA URNS HPF: ABNORMAL /HPF (ref 0–5)
SERVICE CMNT-IMP: ABNORMAL
SODIUM SERPL-SCNC: 123 MMOL/L (ref 136–145)
SODIUM SERPL-SCNC: 133 MMOL/L (ref 136–145)
SP GR UR REFRACTOMETRY: 1.02 (ref 1–1.03)
UROBILINOGEN UR QL STRIP.AUTO: 0.2 EU/DL (ref 0.2–1)
VENTRICULAR RATE, ECG03: 113 BPM
WBC # BLD AUTO: 9.7 K/UL (ref 3.6–11)
WBC URNS QL MICRO: >100 /HPF (ref 0–4)

## 2019-07-16 PROCEDURE — 87077 CULTURE AEROBIC IDENTIFY: CPT

## 2019-07-16 PROCEDURE — 87040 BLOOD CULTURE FOR BACTERIA: CPT

## 2019-07-16 PROCEDURE — 77030005514 HC CATH URETH FOL14 BARD -A

## 2019-07-16 PROCEDURE — 92610 EVALUATE SWALLOWING FUNCTION: CPT | Performed by: SPEECH-LANGUAGE PATHOLOGIST

## 2019-07-16 PROCEDURE — 99285 EMERGENCY DEPT VISIT HI MDM: CPT

## 2019-07-16 PROCEDURE — 77030018798 HC PMP KT ENTRL FED COVD -A

## 2019-07-16 PROCEDURE — 96374 THER/PROPH/DIAG INJ IV PUSH: CPT

## 2019-07-16 PROCEDURE — 85025 COMPLETE CBC W/AUTO DIFF WBC: CPT

## 2019-07-16 PROCEDURE — 74011250636 HC RX REV CODE- 250/636: Performed by: FAMILY MEDICINE

## 2019-07-16 PROCEDURE — 81001 URINALYSIS AUTO W/SCOPE: CPT

## 2019-07-16 PROCEDURE — 74177 CT ABD & PELVIS W/CONTRAST: CPT

## 2019-07-16 PROCEDURE — 65660000000 HC RM CCU STEPDOWN

## 2019-07-16 PROCEDURE — 74011250637 HC RX REV CODE- 250/637: Performed by: INTERNAL MEDICINE

## 2019-07-16 PROCEDURE — 74011250636 HC RX REV CODE- 250/636: Performed by: EMERGENCY MEDICINE

## 2019-07-16 PROCEDURE — 74011000250 HC RX REV CODE- 250: Performed by: INTERNAL MEDICINE

## 2019-07-16 PROCEDURE — 83690 ASSAY OF LIPASE: CPT

## 2019-07-16 PROCEDURE — 87086 URINE CULTURE/COLONY COUNT: CPT

## 2019-07-16 PROCEDURE — 83605 ASSAY OF LACTIC ACID: CPT

## 2019-07-16 PROCEDURE — 51702 INSERT TEMP BLADDER CATH: CPT

## 2019-07-16 PROCEDURE — C9113 INJ PANTOPRAZOLE SODIUM, VIA: HCPCS | Performed by: INTERNAL MEDICINE

## 2019-07-16 PROCEDURE — 36415 COLL VENOUS BLD VENIPUNCTURE: CPT

## 2019-07-16 PROCEDURE — 87205 SMEAR GRAM STAIN: CPT

## 2019-07-16 PROCEDURE — 74011250636 HC RX REV CODE- 250/636: Performed by: INTERNAL MEDICINE

## 2019-07-16 PROCEDURE — 74011636320 HC RX REV CODE- 636/320: Performed by: EMERGENCY MEDICINE

## 2019-07-16 PROCEDURE — 82272 OCCULT BLD FECES 1-3 TESTS: CPT

## 2019-07-16 PROCEDURE — 80053 COMPREHEN METABOLIC PANEL: CPT

## 2019-07-16 PROCEDURE — 3E03329 INTRODUCTION OF OTHER ANTI-INFECTIVE INTO PERIPHERAL VEIN, PERCUTANEOUS APPROACH: ICD-10-PCS | Performed by: INTERNAL MEDICINE

## 2019-07-16 PROCEDURE — 87186 SC STD MICRODIL/AGAR DIL: CPT

## 2019-07-16 PROCEDURE — C9113 INJ PANTOPRAZOLE SODIUM, VIA: HCPCS | Performed by: EMERGENCY MEDICINE

## 2019-07-16 PROCEDURE — 82962 GLUCOSE BLOOD TEST: CPT

## 2019-07-16 PROCEDURE — 71045 X-RAY EXAM CHEST 1 VIEW: CPT

## 2019-07-16 PROCEDURE — 93005 ELECTROCARDIOGRAM TRACING: CPT

## 2019-07-16 PROCEDURE — 96361 HYDRATE IV INFUSION ADD-ON: CPT

## 2019-07-16 PROCEDURE — 96375 TX/PRO/DX INJ NEW DRUG ADDON: CPT

## 2019-07-16 PROCEDURE — 74011000258 HC RX REV CODE- 258: Performed by: EMERGENCY MEDICINE

## 2019-07-16 RX ORDER — ASCORBIC ACID 250 MG
250 TABLET ORAL DAILY
COMMUNITY

## 2019-07-16 RX ORDER — CLONAZEPAM 0.5 MG/1
1 TABLET ORAL 2 TIMES DAILY
Status: DISCONTINUED | OUTPATIENT
Start: 2019-07-16 | End: 2019-07-19 | Stop reason: HOSPADM

## 2019-07-16 RX ORDER — ATORVASTATIN CALCIUM 10 MG/1
10 TABLET, FILM COATED ORAL
Status: DISCONTINUED | OUTPATIENT
Start: 2019-07-16 | End: 2019-07-19 | Stop reason: HOSPADM

## 2019-07-16 RX ORDER — MAGNESIUM SULFATE 100 %
4 CRYSTALS MISCELLANEOUS AS NEEDED
Status: DISCONTINUED | OUTPATIENT
Start: 2019-07-16 | End: 2019-07-19 | Stop reason: SDUPTHER

## 2019-07-16 RX ORDER — BACITRACIN 500 [USP'U]/G
OINTMENT TOPICAL
COMMUNITY

## 2019-07-16 RX ORDER — INSULIN LISPRO 100 [IU]/ML
INJECTION, SOLUTION INTRAVENOUS; SUBCUTANEOUS EVERY 6 HOURS
Status: DISCONTINUED | OUTPATIENT
Start: 2019-07-16 | End: 2019-07-19 | Stop reason: HOSPADM

## 2019-07-16 RX ORDER — LEVOTHYROXINE SODIUM 75 UG/1
37.5 TABLET ORAL
Status: DISCONTINUED | OUTPATIENT
Start: 2019-07-17 | End: 2019-07-19 | Stop reason: HOSPADM

## 2019-07-16 RX ORDER — BETAMETHASONE DIPROPIONATE 0.5 MG/G
CREAM TOPICAL 2 TIMES DAILY
Status: DISCONTINUED | OUTPATIENT
Start: 2019-07-16 | End: 2019-07-16 | Stop reason: DRUGHIGH

## 2019-07-16 RX ORDER — TRAZODONE HYDROCHLORIDE 100 MG/1
200 TABLET ORAL
Status: DISCONTINUED | OUTPATIENT
Start: 2019-07-16 | End: 2019-07-16

## 2019-07-16 RX ORDER — ONDANSETRON 8 MG/1
8 TABLET, ORALLY DISINTEGRATING ORAL
COMMUNITY

## 2019-07-16 RX ORDER — MAGNESIUM SULFATE 100 %
4 CRYSTALS MISCELLANEOUS AS NEEDED
Status: DISCONTINUED | OUTPATIENT
Start: 2019-07-16 | End: 2019-07-19 | Stop reason: HOSPADM

## 2019-07-16 RX ORDER — POLYETHYLENE GLYCOL 3350 17 G/17G
17 POWDER, FOR SOLUTION ORAL 2 TIMES DAILY
COMMUNITY

## 2019-07-16 RX ORDER — BETAMETHASONE DIPROPIONATE 0.5 MG/G
CREAM TOPICAL 2 TIMES DAILY
Status: DISCONTINUED | OUTPATIENT
Start: 2019-07-17 | End: 2019-07-16

## 2019-07-16 RX ORDER — DILTIAZEM HYDROCHLORIDE 180 MG/1
180 CAPSULE, COATED, EXTENDED RELEASE ORAL DAILY
Status: CANCELLED | OUTPATIENT
Start: 2019-07-16

## 2019-07-16 RX ORDER — ONDANSETRON 2 MG/ML
4 INJECTION INTRAMUSCULAR; INTRAVENOUS
Status: DISCONTINUED | OUTPATIENT
Start: 2019-07-16 | End: 2019-07-19 | Stop reason: HOSPADM

## 2019-07-16 RX ORDER — SODIUM CHLORIDE 0.9 % (FLUSH) 0.9 %
5-40 SYRINGE (ML) INJECTION AS NEEDED
Status: DISCONTINUED | OUTPATIENT
Start: 2019-07-16 | End: 2019-07-19 | Stop reason: HOSPADM

## 2019-07-16 RX ORDER — CLOMIPRAMINE HYDROCHLORIDE 25 MG/1
25 CAPSULE ORAL DAILY
Status: DISCONTINUED | OUTPATIENT
Start: 2019-07-17 | End: 2019-07-19 | Stop reason: HOSPADM

## 2019-07-16 RX ORDER — IPRATROPIUM BROMIDE AND ALBUTEROL SULFATE 2.5; .5 MG/3ML; MG/3ML
3 SOLUTION RESPIRATORY (INHALATION)
Status: DISCONTINUED | OUTPATIENT
Start: 2019-07-16 | End: 2019-07-19 | Stop reason: HOSPADM

## 2019-07-16 RX ORDER — SODIUM CHLORIDE TAB 1 GM 1 G
1 TAB MISCELLANEOUS 2 TIMES DAILY
COMMUNITY

## 2019-07-16 RX ORDER — PANTOPRAZOLE SODIUM 40 MG/10ML
40 INJECTION, POWDER, LYOPHILIZED, FOR SOLUTION INTRAVENOUS
Status: COMPLETED | OUTPATIENT
Start: 2019-07-16 | End: 2019-07-16

## 2019-07-16 RX ORDER — LATANOPROST 50 UG/ML
1 SOLUTION/ DROPS OPHTHALMIC
COMMUNITY

## 2019-07-16 RX ORDER — CLOMIPRAMINE HYDROCHLORIDE 25 MG/1
25 CAPSULE ORAL
COMMUNITY

## 2019-07-16 RX ORDER — SODIUM CHLORIDE 0.9 % (FLUSH) 0.9 %
5-40 SYRINGE (ML) INJECTION EVERY 8 HOURS
Status: DISCONTINUED | OUTPATIENT
Start: 2019-07-16 | End: 2019-07-19 | Stop reason: HOSPADM

## 2019-07-16 RX ORDER — FACIAL-BODY WIPES
10 EACH TOPICAL DAILY PRN
Status: DISCONTINUED | OUTPATIENT
Start: 2019-07-16 | End: 2019-07-19 | Stop reason: HOSPADM

## 2019-07-16 RX ORDER — CLOMIPRAMINE HYDROCHLORIDE 25 MG/1
75 CAPSULE ORAL DAILY
Status: DISCONTINUED | OUTPATIENT
Start: 2019-07-16 | End: 2019-07-16

## 2019-07-16 RX ORDER — BETAMETHASONE DIPROPIONATE 0.5 MG/G
CREAM TOPICAL 2 TIMES DAILY
Status: DISCONTINUED | OUTPATIENT
Start: 2019-07-17 | End: 2019-07-17

## 2019-07-16 RX ORDER — SODIUM CHLORIDE 0.9 % (FLUSH) 0.9 %
10 SYRINGE (ML) INJECTION
Status: COMPLETED | OUTPATIENT
Start: 2019-07-16 | End: 2019-07-16

## 2019-07-16 RX ORDER — FOLIC ACID 1 MG/1
1 TABLET ORAL DAILY
Status: DISCONTINUED | OUTPATIENT
Start: 2019-07-16 | End: 2019-07-19 | Stop reason: HOSPADM

## 2019-07-16 RX ORDER — PRAVASTATIN SODIUM 40 MG/1
40 TABLET ORAL
Status: DISCONTINUED | OUTPATIENT
Start: 2019-07-16 | End: 2019-07-19 | Stop reason: HOSPADM

## 2019-07-16 RX ORDER — DIPHENHYDRAMINE HCL 25 MG
25 TABLET ORAL 2 TIMES DAILY
COMMUNITY
End: 2019-07-19

## 2019-07-16 RX ORDER — QUETIAPINE FUMARATE 25 MG/1
50 TABLET, FILM COATED ORAL 2 TIMES DAILY
Status: DISCONTINUED | OUTPATIENT
Start: 2019-07-16 | End: 2019-07-19 | Stop reason: HOSPADM

## 2019-07-16 RX ORDER — PANTOPRAZOLE SODIUM 40 MG/1
40 TABLET, DELAYED RELEASE ORAL
Status: DISCONTINUED | OUTPATIENT
Start: 2019-07-16 | End: 2019-07-16

## 2019-07-16 RX ORDER — METOPROLOL TARTRATE 5 MG/5ML
1.25 INJECTION INTRAVENOUS
Status: DISCONTINUED | OUTPATIENT
Start: 2019-07-16 | End: 2019-07-19 | Stop reason: HOSPADM

## 2019-07-16 RX ORDER — UREA 10 %
100 LOTION (ML) TOPICAL DAILY
COMMUNITY

## 2019-07-16 RX ORDER — FLUOCINONIDE 0.5 MG/G
CREAM TOPICAL 2 TIMES DAILY
Status: DISCONTINUED | OUTPATIENT
Start: 2019-07-17 | End: 2019-07-16

## 2019-07-16 RX ORDER — ONDANSETRON 2 MG/ML
4 INJECTION INTRAMUSCULAR; INTRAVENOUS
Status: DISCONTINUED | OUTPATIENT
Start: 2019-07-16 | End: 2019-07-16

## 2019-07-16 RX ORDER — DILTIAZEM HYDROCHLORIDE 180 MG/1
180 CAPSULE, COATED, EXTENDED RELEASE ORAL DAILY
Status: DISCONTINUED | OUTPATIENT
Start: 2019-07-16 | End: 2019-07-19 | Stop reason: HOSPADM

## 2019-07-16 RX ORDER — SODIUM CHLORIDE 9 MG/ML
100 INJECTION, SOLUTION INTRAVENOUS CONTINUOUS
Status: DISCONTINUED | OUTPATIENT
Start: 2019-07-16 | End: 2019-07-16 | Stop reason: SINTOL

## 2019-07-16 RX ORDER — NAPROXEN SODIUM 220 MG
220 TABLET ORAL 2 TIMES DAILY WITH MEALS
COMMUNITY

## 2019-07-16 RX ORDER — LANOLIN ALCOHOL/MO/W.PET/CERES
325 CREAM (GRAM) TOPICAL 2 TIMES DAILY
Status: DISCONTINUED | OUTPATIENT
Start: 2019-07-16 | End: 2019-07-19 | Stop reason: HOSPADM

## 2019-07-16 RX ORDER — MIRTAZAPINE 15 MG/1
45 TABLET, FILM COATED ORAL
Status: DISCONTINUED | OUTPATIENT
Start: 2019-07-16 | End: 2019-07-19 | Stop reason: HOSPADM

## 2019-07-16 RX ORDER — SIMETHICONE 80 MG
80 TABLET,CHEWABLE ORAL 2 TIMES DAILY
COMMUNITY

## 2019-07-16 RX ORDER — ACETAMINOPHEN 325 MG/1
650 TABLET ORAL
Status: DISCONTINUED | OUTPATIENT
Start: 2019-07-16 | End: 2019-07-19 | Stop reason: HOSPADM

## 2019-07-16 RX ORDER — VANCOMYCIN 2 GRAM/500 ML IN 0.9 % SODIUM CHLORIDE INTRAVENOUS
2000
Status: COMPLETED | OUTPATIENT
Start: 2019-07-16 | End: 2019-07-16

## 2019-07-16 RX ORDER — NALOXONE HYDROCHLORIDE 0.4 MG/ML
0.4 INJECTION, SOLUTION INTRAMUSCULAR; INTRAVENOUS; SUBCUTANEOUS AS NEEDED
Status: DISCONTINUED | OUTPATIENT
Start: 2019-07-16 | End: 2019-07-19 | Stop reason: HOSPADM

## 2019-07-16 RX ORDER — HALOPERIDOL 5 MG/ML
1 INJECTION INTRAMUSCULAR
Status: DISCONTINUED | OUTPATIENT
Start: 2019-07-16 | End: 2019-07-19 | Stop reason: HOSPADM

## 2019-07-16 RX ORDER — PREDNISOLONE ACETATE 10 MG/ML
1 SUSPENSION/ DROPS OPHTHALMIC 3 TIMES DAILY
Status: DISCONTINUED | OUTPATIENT
Start: 2019-07-16 | End: 2019-07-19 | Stop reason: HOSPADM

## 2019-07-16 RX ORDER — DEXTROSE MONOHYDRATE 50 MG/ML
50 INJECTION, SOLUTION INTRAVENOUS CONTINUOUS
Status: DISCONTINUED | OUTPATIENT
Start: 2019-07-16 | End: 2019-07-19 | Stop reason: HOSPADM

## 2019-07-16 RX ORDER — VENLAFAXINE HYDROCHLORIDE 150 MG/1
150 CAPSULE, EXTENDED RELEASE ORAL
Status: DISCONTINUED | OUTPATIENT
Start: 2019-07-16 | End: 2019-07-19 | Stop reason: HOSPADM

## 2019-07-16 RX ORDER — SODIUM CHLORIDE 0.9 % (FLUSH) 0.9 %
5-10 SYRINGE (ML) INJECTION AS NEEDED
Status: DISCONTINUED | OUTPATIENT
Start: 2019-07-16 | End: 2019-07-19 | Stop reason: HOSPADM

## 2019-07-16 RX ADMIN — QUETIAPINE FUMARATE 50 MG: 25 TABLET ORAL at 18:38

## 2019-07-16 RX ADMIN — SODIUM CHLORIDE 859 ML: 900 INJECTION, SOLUTION INTRAVENOUS at 06:33

## 2019-07-16 RX ADMIN — PREDNISOLONE ACETATE 1 DROP: 10 SUSPENSION/ DROPS OPHTHALMIC at 09:23

## 2019-07-16 RX ADMIN — QUETIAPINE FUMARATE 50 MG: 25 TABLET ORAL at 11:40

## 2019-07-16 RX ADMIN — SODIUM CHLORIDE 1000 ML: 900 INJECTION, SOLUTION INTRAVENOUS at 04:56

## 2019-07-16 RX ADMIN — DILTIAZEM HYDROCHLORIDE 180 MG: 180 CAPSULE, COATED, EXTENDED RELEASE ORAL at 11:40

## 2019-07-16 RX ADMIN — DEXTROSE MONOHYDRATE 50 ML/HR: 5 INJECTION, SOLUTION INTRAVENOUS at 23:38

## 2019-07-16 RX ADMIN — SODIUM CHLORIDE 100 ML/HR: 900 INJECTION, SOLUTION INTRAVENOUS at 09:24

## 2019-07-16 RX ADMIN — HALOPERIDOL LACTATE 1 MG: 5 INJECTION INTRAMUSCULAR at 22:51

## 2019-07-16 RX ADMIN — PRAVASTATIN SODIUM 40 MG: 40 TABLET ORAL at 21:05

## 2019-07-16 RX ADMIN — SODIUM CHLORIDE 100 ML/HR: 900 INJECTION, SOLUTION INTRAVENOUS at 19:49

## 2019-07-16 RX ADMIN — VANCOMYCIN HYDROCHLORIDE 2000 MG: 10 INJECTION, POWDER, LYOPHILIZED, FOR SOLUTION INTRAVENOUS at 09:24

## 2019-07-16 RX ADMIN — PREDNISOLONE ACETATE 1 DROP: 10 SUSPENSION/ DROPS OPHTHALMIC at 17:16

## 2019-07-16 RX ADMIN — IOPAMIDOL 100 ML: 755 INJECTION, SOLUTION INTRAVENOUS at 08:31

## 2019-07-16 RX ADMIN — PANTOPRAZOLE SODIUM 40 MG: 40 INJECTION, POWDER, FOR SOLUTION INTRAVENOUS at 04:41

## 2019-07-16 RX ADMIN — SODIUM CHLORIDE 40 MG: 9 INJECTION, SOLUTION INTRAMUSCULAR; INTRAVENOUS; SUBCUTANEOUS at 09:23

## 2019-07-16 RX ADMIN — VENLAFAXINE HYDROCHLORIDE 150 MG: 37.5 CAPSULE, EXTENDED RELEASE ORAL at 11:44

## 2019-07-16 RX ADMIN — CLONAZEPAM 1 MG: 1 TABLET ORAL at 18:38

## 2019-07-16 RX ADMIN — PREDNISOLONE ACETATE 1 DROP: 10 SUSPENSION/ DROPS OPHTHALMIC at 22:04

## 2019-07-16 RX ADMIN — VANCOMYCIN HYDROCHLORIDE 1000 MG: 1 INJECTION, POWDER, LYOPHILIZED, FOR SOLUTION INTRAVENOUS at 21:05

## 2019-07-16 RX ADMIN — CEFTRIAXONE 1 G: 1 INJECTION, POWDER, FOR SOLUTION INTRAMUSCULAR; INTRAVENOUS at 07:18

## 2019-07-16 RX ADMIN — Medication 10 ML: at 09:38

## 2019-07-16 RX ADMIN — MIRTAZAPINE 45 MG: 15 TABLET, FILM COATED ORAL at 21:05

## 2019-07-16 RX ADMIN — ATORVASTATIN CALCIUM 10 MG: 10 TABLET, FILM COATED ORAL at 21:05

## 2019-07-16 RX ADMIN — Medication 20 ML: at 22:00

## 2019-07-16 RX ADMIN — FOLIC ACID 1 MG: 1 TABLET ORAL at 09:22

## 2019-07-16 RX ADMIN — CLONAZEPAM 1 MG: 1 TABLET ORAL at 09:22

## 2019-07-16 RX ADMIN — SODIUM CHLORIDE 1000 ML: 900 INJECTION, SOLUTION INTRAVENOUS at 04:43

## 2019-07-16 RX ADMIN — Medication 10 ML: at 09:47

## 2019-07-16 RX ADMIN — SODIUM CHLORIDE 40 MG: 9 INJECTION, SOLUTION INTRAMUSCULAR; INTRAVENOUS; SUBCUTANEOUS at 21:05

## 2019-07-16 RX ADMIN — FERROUS SULFATE TAB 325 MG (65 MG ELEMENTAL FE) 325 MG: 325 (65 FE) TAB at 11:40

## 2019-07-16 RX ADMIN — FERROUS SULFATE TAB 325 MG (65 MG ELEMENTAL FE) 325 MG: 325 (65 FE) TAB at 17:16

## 2019-07-16 NOTE — PROGRESS NOTES
Reason for Admission:   sepsis                  RRAT Score:     18 moderate risk             Do you (patient/family) have any concerns for transition/discharge? Pt's daughter is concerned about bed hold fee at [facility] if patient is hospitalized very long. Stated that [facility] had a significant bill for bed hold the last time patient was hospitalized. Encouraged patient's daughter to reach out to [facility] admissions office. Plan for utilizing home health:   TBD    Current Advanced Directive/Advance Care Plan:  none            Transition of Care Plan:          1. Patient in ED bed waiting for inpatient admission  2. Patient will need 2nd IM letter at discharge  3. Patient's daughter Marcela prefers for patient to discharge back to [facility] with follow up appointments. Patient is a 70year old female admitted 7/16 for sepsis. Patient out of room for test, patient's daughter Marcela present in room to answer CM questions. Demographic information verified and correct - patient lives in [address] [place] [address] at [facility]. Patient's daughter states she has a (rare) private room. Insurance information verified and correct. Patient does not use home oxygen, needs a wheelchair for all mobility (patient does not walk). Patient's medications are provided by [facility]. [facility] staff assist patient with all ADLS and provide transportation. Care Management Interventions  PCP Verified by CM: Neris Wiggins MD)  Mode of Transport at Discharge:  Other (see comment)([facility] provides transportation)  Transition of Care Consult (CM Consult): Discharge Planning  Discharge Durable Medical Equipment: No  Physical Therapy Consult: No  Occupational Therapy Consult: No  Speech Therapy Consult: No  Current Support Network: Nursing Facility(lives at [facility])  Confirm Follow Up Transport: Other (see comment)  Plan discussed with Pt/Family/Caregiver: Yes  Discharge Location  Discharge Placement: [address] RN, [address] [place] [address]  [phone]

## 2019-07-16 NOTE — ACP (ADVANCE CARE PLANNING)
Advance Care Planning Note    Name: Berny Perales  YOB: 1940  MRN: [id number]  Admission Date: 7/16/2019  4:06 AM    Date of discussion: 7/16/2019    Active Diagnoses:    Hospital Problems  Date Reviewed: 3/19/2019          Codes Class Noted POA    Severe sepsis [facility]) ICD-10-CM: A41.9, R65.20  ICD-9-CM: 038.9, 995.92  7/16/2019 Unknown                These active diagnoses are of sufficient risk that focused discussion on advance care planning is indicated in order to allow the patient to thoughtfully consider personal goals of care, and if situations arise that prevent the ability to personally give input, to ensure appropriate representation of their personal desires for different levels and aggressiveness of care. Persons present and participating in discussion: patient Elaine Mary and her daughter/MPOA Markell Roberts     Discussion: Code status addressed due to above mentioned medical problems and also her chronic comorbidity /advance age. Patient and her MPOA wants her  to be a Full Code. Patient  would like to assign Markell Roberts  as the surrogate decision maker. Time Spent:   Total time spent face-to-face in education and discussion:16 minutes.      Stacia Wood MD  7/16/2019  8:20 AM

## 2019-07-16 NOTE — PROGRESS NOTES
Pharmacy Clarification of the Prior to Admission Medication Regimen Retrospective to the Admission Medication Reconciliation    The patient was not interviewed regarding clarification of the prior to admission medication regimen. Patient was transferred from [facility], to [address] [place] [address], with a current med list.     Information Obtained From: Transfer Papers    Recommendations/Findings: The following amendments were made to the patient's active medication list on file at [address] [place] [address]:     1) Additions:   ascorbic acid, vitamin C, (VITAMIN C) 250 mg tablet  bacitracin (BACITRACIN) 500 unit/gram oint  diphenhydrAMINE (BENADRYL ALLERGY) 25 mg tablet  clomiPRAMINE (ANAFRANIL) 25 mg capsule  simethicone (MYLICON) 80 mg chewable tablet  sodium chloride 1 gram tablet  methotrexate (TREXALL) 7.5 mg tablet    2) Removals:   Duo Neb  Topicort  Doxycycline  Levofloxacin  Metoprolol  Lovastatin  Quetiapine  Trazodone    3) Changes: None    4) Pertinent Pharmacy Findings:  Updated patient?s preferred outpatient pharmacy to: T did not update the outpatient pharmacy due to the patient living at a SNF      PTA medication list was corrected to the following:     Prior to Admission Medications   Prescriptions Last Dose Informant Patient Reported? Taking? HUMIRA PEN 40 mg/0.8 mL injection pen 7/12/2019 at Unknown time Transfer Papers No Yes   Sig: INJECT 40MG(1 PEN) UNDER SKIN EVERY WEEK. HYDROcodone-acetaminophen (NORCO) 5-325 mg per tablet 7/9/2019 at Unknown time Transfer Papers Yes Yes   Sig: Take 1 Tab by mouth four (4) times daily. acetaminophen (TYLENOL) 325 mg tablet 7/9/2019 at Unknown time Transfer Papers Yes Yes   Sig: Take 650 mg by mouth every four (4) hours as needed for Pain. ascorbic acid, vitamin C, (VITAMIN C) 250 mg tablet 7/9/2019 at Unknown time Transfer Papers Yes Yes   Sig: Take 250 mg by mouth daily.    atorvastatin (LIPITOR) 10 mg tablet 7/9/2019 at Unknown time Transfer Papers Yes Yes   Sig: Take 10 mg by mouth daily. bacitracin (BACITRACIN) 500 unit/gram oint 7/9/2019 at Unknown time Transfer Papers Yes Yes   Sig: Apply  to affected area nightly. Apply to PEG insertion   clomiPRAMINE (ANAFRANIL) 25 mg capsule 7/9/2019 at Unknown time Transfer Papers Yes Yes   Sig: Take 25 mg by mouth nightly. clomiPRAMINE (ANAFRANIL) 75 mg capsule 7/9/2019 at Unknown time Transfer Papers Yes Yes   Sig: Take 150 mg by mouth nightly. clonazePAM (KLONOPIN) 1 mg tablet 7/9/2019 at Unknown time Transfer Papers Yes Yes   Sig: Take 1 mg by mouth two (2) times a day. cyanocobalamin (VITAMIN B-12) 100 mcg tablet 7/9/2019 at Unknown time Transfer Papers Yes Yes   Sig: Take 100 mcg by mouth daily. dilTIAZem CD (CARDIZEM CD) 180 mg ER capsule 7/9/2019 at Unknown time Transfer Papers Yes Yes   Sig: Take 180 mg by mouth daily. diphenhydrAMINE (BENADRYL ALLERGY) 25 mg tablet 7/9/2019 at Unknown time Transfer Papers Yes Yes   Sig: Take 25 mg by mouth two (2) times a day. docusate sodium (COLACE) 100 mg capsule 7/9/2019 at Unknown time Transfer Papers Yes Yes   Sig: Take 100 mg by mouth two (2) times a day. ferrous sulfate (IRON, FERROUS SULFATE,) 325 mg (65 mg iron) tablet 7/9/2019 at Unknown time Transfer Papers No Yes   Sig: Take 1 Tab by mouth two (2) times a day. folic acid (FOLVITE) 1 mg tablet 7/9/2019 at Unknown time Transfer Papers Yes Yes   Sig: Take 1 mg by mouth daily. latanoprost (XALATAN) 0.005 % ophthalmic solution 7/9/2019 at Unknown time Transfer Papers Yes Yes   Sig: Administer 1 Drop to right eye nightly. levothyroxine (SYNTHROID) 75 mcg tablet 7/9/2019 at Unknown time Transfer Papers Yes Yes   Sig: Take 37.5 mcg by mouth Daily (before breakfast). methotrexate (TREXALL) 7.5 mg tablet 7/9/2019 at Unknown time Transfer Papers Yes Yes   Sig: Take 7.5 mg by mouth every seven (7) days. mirtazapine (REMERON) 30 mg tablet 7/9/2019 at Unknown time Transfer Papers Yes Yes   Sig: Take 30 mg by mouth nightly. multivitamin (ONE A DAY) tablet 2019 at Unknown time Transfer Papers Yes Yes   Sig: Take 1 Tab by mouth daily. naloxone (NARCAN) 0.4 mg/mL injection Unknown at Unknown time Transfer Papers Yes No   Si.4 mg by IntraVENous route as needed. naproxen sodium (NAPROSYN) 220 mg tablet 2019 at Unknown time Transfer Papers Yes Yes   Sig: Take 220 mg by mouth two (2) times daily (with meals). ondansetron (ZOFRAN ODT) 8 mg disintegrating tablet 2019 at Unknown time Transfer Papers Yes Yes   Sig: Take 8 mg by mouth every eight (8) hours as needed for Nausea. pantoprazole (PROTONIX) 40 mg tablet 2019 at Unknown time Transfer Papers No Yes   Sig: Take 1 Tab by mouth Daily (before breakfast). polyethylene glycol (MIRALAX) 17 gram packet 2019 at Unknown time Transfer Papers Yes Yes   Sig: Take 17 g by mouth two (2) times a day. prednisoLONE acetate (PRED FORTE) 1 % ophthalmic suspension 2019 at Unknown time Transfer Papers Yes Yes   Sig: Administer 1 Drop to right eye two (2) times a day. simethicone (MYLICON) 80 mg chewable tablet 2019 at Unknown time Transfer Papers Yes Yes   Sig: Take 80 mg by mouth two (2) times a day. sodium chloride 1 gram tablet 2019 at Unknown time Transfer Papers Yes Yes   Sig: Take 1 g by mouth two (2) times a day. venlafaxine-SR (EFFEXOR-XR) 37.5 mg capsule 2019 at Unknown time Transfer Papers Yes Yes   Sig: Take 37.5 mg by mouth daily.       Facility-Administered Medications: None          Thank you,  Rose Prom  Medication History Pharmacy Technician

## 2019-07-16 NOTE — ED NOTES
Patient is alert to self and place only, she is confused to situation but appropriate and answering questions. Daughter is at bedside and able to give much of the patient's history. Patient denies any complaints, pain, nausea or resp distress. She has a peg tube in place, daughter states that she has this for most of her nutrition needs and is able to drink regular liquids and eat \"some\" foods. Patient is requesting coffee at this time which daughter affirms that she is able to drink. Patient also requesting . She has arthritic deformed hands and is bed ridden. She has a delgado catheter in place draining cloudy yellow urine. Skin intact. Patient is repositioned and placed in high fowlers and provided with coffee.

## 2019-07-16 NOTE — PROGRESS NOTES
Spiritual Care Assessment/Progress Note  [facility]      NAME: Jean-Paul Iniguez      MRN: [id number]  AGE: 70 y.o.  SEX: female  Roman Catholic Affiliation: Baptism   Language: English     7/16/2019     Total Time (in minutes): 15     Spiritual Assessment begun in [facility] EMERGENCY DEPT through conversation with:         [x]Patient        [x] Family    [] Friend(s)        Reason for Consult: Request by staff     Spiritual beliefs: (Please include comment if needed)     [x] Identifies with a bri tradition:         [] Supported by a bri community:            [] Claims no spiritual orientation:           [] Seeking spiritual identity:                [] Adheres to an individual form of spirituality:           [] Not able to assess:                           Identified resources for coping:      [] Prayer                               [] Music                  [] Guided Imagery     [x] Family/friends                 [] Pet visits     [x] Devotional reading                         [] Unknown     [] Other:                                              Interventions offered during this visit: (See comments for more details)    Patient Interventions: Affirmation of emotions/emotional suffering, Affirmation of bri, Bible or other spiritual literature provided, Initial visit, Prayer (assurance of)     Family/Friend(s): Prayer (assurance of)     Plan of Care:     [] Support spiritual and/or cultural needs    [] Support AMD and/or advance care planning process      [] Support grieving process   [] Coordinate Rites and/or Rituals    [] Coordination with community clergy   [] No spiritual needs identified at this time   [] Detailed Plan of Care below (See Comments)  [] Make referral to Music Therapy  [] Make referral to Pet Therapy     [] Make referral to Addiction services  [] Make referral to [facility]  [] Make referral to Spiritual Care Partner  [] No future visits requested        [x] Follow up visits as needed     Comments: Responded to a page from [address] staff. Met with patient and her daughter at bedside. Patient requested for a bible. Provided a bible and affirmed her bri. Advised her of  availability. Rev.  Lisa Sanchez,   Paging Service: [phone])

## 2019-07-16 NOTE — PROGRESS NOTES
[address]  Fl[address] and f/u appts    CM sent referral via connect care [place] [facility] Pt was there prior to admission and pt's family would like her to return at discharge. CM called and left message with the admissions dept at [facility]. 12pm - Received call back from the admissions dept with [facility] They will accept pt and stated pt is a LTC resident with them and has been there since 4/8/2016. They will speak with family about the bed hold cost.   2pm - [facility] [facility]. CM met with pt and pt's daughter at the bedside. [facility] form signed by pt's daughter for [facility] [place]. 4:30pm - Patient transferred to [facility], room 2219. DORIS gave hand off report to Justice Hernandez CM.      Semaj Stallings, [address]

## 2019-07-16 NOTE — H&P
Hospitalist Admission Note    NAME: Jamin Patel   :  1940   MRN:  [id number]     Date/Time:  2019 8:03 AM    Patient PCP: Ann Leon MD  ______________________________________________________________________  Given the patient's current clinical presentation, I have a high level of concern for decompensation if discharged from the emergency department. Complex decision making was performed, which includes reviewing the patient's available past medical records, laboratory results, and x-ray films. My assessment of this patient's clinical condition and my plan of care is as follows. Assessment / Plan:  Severe sepsis, POA  Acute hypoxic respiratory failure  UTI  PNA  -etiology including PEG site infection, ? PNA on CXR, UTI  -BP improving with IVF, will cont' gentle IVF  -UA suggestive of UTI, will f/u with Ucx  -CXR with new right lung base atelectasis versus pneumonia. Chronic interstitial lung  Disease. Erosions of the left glenohumeral joint are new and represent infectious or  inflammatory arthritis. Consider dedicated shoulder views.  -obtain CT a/p for further evaluation   -empiric abx vanc/rocephin  -nebs, O2 suppl prn    Hyponatremia , POA  -likely due to sepsis  -start IVF, repeat bmp at 1400    Bloody emesis concerning for GI bleed   -daughter states pt either cough up or vomiting dark blood  -start IV PPI and monitor for further emesis as her hgb is stable and FOBT neg  -monitor, hold GI consultation for now    RA with joints deformities  -stable, bedbound at baseline     HTN  -hold home meds due to hypotension    Dysphagia s/p PEG  -nutrition consult for TF    RA (rheumatoid arthritis)   Humira Q week     Anxiety and Depression  Cont' home meds    B/l LE rash  -daughter states rash improving after pt taken off risperidone  -monitor for now     Obesity  Body mass index is 30.21 kg/m².       Code Status: Full  Surrogate Decision Maker: Vilma Ovalles  DVT Prophylaxis: SCDs for now due to concerns of vomiting blood  GI Prophylaxis: not indicated  Baseline: from NH, bedbound      Subjective:   CHIEF COMPLAINT: vomiting blood    HISTORY OF PRESENT ILLNESS:     Elaine Miller is a 70 y.o.  female who presents with RA, HTN, dysphagia s/p PEG placement, present to the ER from NH for evaluation of ?vomiting dark blood. Other complaint includes rash on b/l LE for ~3-4 days per daughter. In the ER pt is AAOx2, intermittent confusion at times which is baseline per daughter. Pt denies any acute complaints. In the ER, vitals: T98.7, P 130, BP 58/22, SpO2 88% on RA  Pertinent labs: Na 123, WBC 9.7, UA suggestive of UTI  On physical exam, pt has b/l LE rash up in knee. PEG site is erythematous and draining purulent material.  She has a delgado catheter in place. We were asked to admit for work up and evaluation of the above problems. Past Medical History:   Diagnosis Date    Baker's cyst of knee     Depression     Hypertension     RA (rheumatoid arthritis) ([facility] [address]         Past Surgical History:   Procedure Laterality Date    COLONOSCOPY N/A 2/28/2017    COLONOSCOPY performed by Lucie Cleveland MD at [facility] ENDOSCOPY    HX HERNIA REPAIR      HX UROLOGICAL      \"bladder tack\"       Social History     Tobacco Use    Smoking status: Former Smoker    Smokeless tobacco: Never Used   Substance Use Topics    Alcohol use: No        Family History   Problem Relation Age of Onset    No Known Problems Mother     No Known Problems Father      Allergies   Allergen Reactions    Codeine Nausea Only        Prior to Admission medications    Medication Sig Start Date End Date Taking?  Authorizing Provider   HUMIRA PEN 40 mg/0.8 mL injection pen INJECT 40MG(1 PEN) UNDER SKIN EVERY WEEK. 6/7/19   Jace Prater MD   HUMIRA PEN 40 mg/0.8 mL injection pen INJECT 40MG(1 PEN) UNDER SKIN EVERY WEEK. 6/7/19   Jace Prater MD   atorvastatin (LIPITOR) 10 mg tablet  1/9/19 Provider, Historical   clomiPRAMINE (ANAFRANIL) 75 mg capsule  2/1/19   Provider, Historical   desoximetasone (TOPICORT) 0.25 % topical cream  1/4/19   Provider, Historical   dilTIAZem CD (CARDIZEM CD) 180 mg ER capsule  2/12/19   Provider, Historical   doxycycline (VIBRA-TABS) 100 mg tablet  12/28/18   Provider, Historical   albuterol-ipratropium (DUO-NEB) 2.5 mg-0.5 mg/3 ml nebu  2/7/19   Provider, Historical   naproxen (NAPROSYN) 500 mg tablet  12/23/18   Provider, Historical   levoFLOXacin (LEVAQUIN) 500 mg tablet Take 1 Tab by mouth daily. 7/11/18   Aileen Russell MD   docusate sodium (COLACE) 100 mg capsule Take 100 mg by mouth two (2) times a day. Other, MD Nieves   naloxone (NARCAN) 0.4 mg/mL injection 0.4 mg by IntraVENous route as needed. Other, MD Nieves   clonazePAM (KLONOPIN) 1 mg tablet Take 1 mg by mouth two (2) times a day. Provider, Historical   levothyroxine (SYNTHROID) 75 mcg tablet Take 37.5 mcg by mouth Daily (before breakfast). Provider, Historical   traZODone (DESYREL) 100 mg tablet Take 200 mg by mouth nightly. Provider, Historical   QUEtiapine (SEROQUEL) 100 mg tablet Take 50 mg by mouth two (2) times a day. Provider, Historical   HYDROcodone-acetaminophen (NORCO) 5-325 mg per tablet Take  by mouth every six (6) hours as needed for Pain. Provider, Historical   multivitamin (ONE A DAY) tablet Take 1 Tab by mouth daily. Provider, Historical   ferrous sulfate (IRON, FERROUS SULFATE,) 325 mg (65 mg iron) tablet Take 1 Tab by mouth two (2) times a day. 3/1/17   Adela Murray MD   pantoprazole (PROTONIX) 40 mg tablet Take 1 Tab by mouth Daily (before breakfast). 2/28/17   Adela Murray MD   venlafaxine-SR [facility] 150 mg capsule Take  by mouth daily. Provider, Historical   folic acid (FOLVITE) 1 mg tablet Take  by mouth daily. Provider, Historical   polyvinyl alcohol (LIQUIFILM TEARS) 1.4 % ophthalmic solution Administer 1 Drop to both eyes as needed.     Provider, Historical   acetaminophen (TYLENOL) 325 mg tablet Take  by mouth every four (4) hours as needed for Pain. Provider, Historical   lovastatin (MEVACOR) 40 mg tablet Take 40 mg by mouth daily. 4/28/15   Provider, Historical   metoprolol (LOPRESSOR) 25 mg tablet Take 25 mg by mouth two (2) times a day. 4/13/15   Provider, Historical   mirtazapine (REMERON) 45 mg tablet Take 45 mg by mouth nightly. 4/20/15   Provider, Historical   prednisoLONE acetate (PRED FORTE) 1 % ophthalmic suspension Administer 1 Drop to left eye three (3) times daily. 4/27/15   Provider, Historical       REVIEW OF SYSTEMS:     I am not able to complete the review of systems because:    The patient is intubated and sedated    The patient has altered mental status due to his acute medical problems    The patient has baseline aphasia from prior stroke(s)    The patient has baseline dementia and is not reliable historian    The patient is in acute medical distress and unable to provide information           Total of 12 systems reviewed as follows:       POSITIVE= underlined text  Negative = text not underlined  General:  fever, chills, sweats, generalized weakness, weight loss/gain,      loss of appetite   Eyes:    blurred vision, eye pain, loss of vision, double vision  ENT:    rhinorrhea, pharyngitis   Respiratory:   cough, sputum production, SOB, MADERA, wheezing, pleuritic pain   Cardiology:   chest pain, palpitations, orthopnea, PND, edema, syncope   Gastrointestinal:  abdominal pain , N/V, diarrhea, dysphagia, constipation,vomiting dark blood  Genitourinary:  frequency, urgency, dysuria, hematuria, incontinence   Muskuloskeletal :  arthralgia, myalgia, back pain  Hematology:  easy bruising, nose or gum bleeding, lymphadenopathy   Dermatological: rash, ulceration, pruritis, color change / jaundice  Endocrine:   hot flashes or polydipsia   Neurological:  headache, dizziness, confusion, focal weakness, paresthesia,     Speech difficulties, memory loss, gait difficulty  Psychological: Feelings of anxiety, depression, agitation    Objective:   VITALS:    Visit Vitals  /43   Pulse (!) 115   Temp 98.1 °F (36.7 °C)   Resp 18   Ht 5' 2\" (1.575 m)   Wt 95.3 kg (210 lb)   SpO2 99%   BMI 38.41 kg/m²       PHYSICAL EXAM:    General:    Alert, cooperative, no distress, appears stated age. HEENT: Atraumatic, anicteric sclerae, pink conjunctivae     No oral ulcers, mucosa moist, throat clear, dentition fair  Neck:  Supple, symmetrical,  thyroid: non tender  Lungs:   Clear to auscultation bilaterally. No Wheezing or Rhonchi. No rales. Chest wall:  No tenderness  No Accessory muscle use. Heart:   Regular  rhythm,  No  murmur   No edema  Abdomen:   Erythema with purulent drainage around PEG site. +BS, NT  Extremities: B/l hands/feet joint deformities. No cyanosis. No clubbing,      Skin turgor normal, Capillary refill normal, Radial dial pulse 2+  Skin:     Not pale. Not Jaundiced  No rashes   Psych:   Not depressed. Not anxious or agitated.   Neurologic: AAOx2, conversant, intermittent confusion (baseline per daughter)    _______________________________________________________________________  Care Plan discussed with:    Comments   Patient x    Family  x daughter   RN x    Care Manager                    Consultant:      _______________________________________________________________________  Expected  Disposition:   Home with Family    HH/PT/OT/RN    SNF/LTC x   [facility]    ________________________________________________________________________  TOTAL TIME:  72 Minutes    Critical Care Provided     Minutes non procedure based      Comments     Reviewed previous records   >50% of visit spent in counseling and coordination of care  Discussion with patient and/or family and questions answered       ________________________________________________________________________  Signed: Ant Romero MD    Procedures: see electronic medical records for all procedures/Xrays and details which were not copied into this note but were reviewed prior to creation of Plan. LAB DATA REVIEWED:    Recent Results (from the past 24 hour(s))   CULTURE, BLOOD    Collection Time: 07/16/19  4:32 AM   Result Value Ref Range    Special Requests: NO SPECIAL REQUESTS      Culture result: NO GROWTH AFTER 3 HOURS     URINALYSIS W/ RFLX MICROSCOPIC    Collection Time: 07/16/19  4:32 AM   Result Value Ref Range    Color YELLOW/STRAW      Appearance TURBID (A) CLEAR      Specific gravity 1.020 1.003 - 1.030      pH (UA) 8.0 5.0 - 8.0      Protein NEGATIVE  NEG mg/dL    Glucose NEGATIVE  NEG mg/dL    Ketone NEGATIVE  NEG mg/dL    Blood LARGE (A) NEG      Urobilinogen 0.2 0.2 - 1.0 EU/dL    Nitrites NEGATIVE  NEG      Leukocyte Esterase LARGE (A) NEG      Bilirubin UA, confirm POSITIVE (A) NEG     METABOLIC PANEL, COMPREHENSIVE    Collection Time: 07/16/19  4:32 AM   Result Value Ref Range    Sodium 123 (L) 136 - 145 mmol/L    Potassium 4.2 3.5 - 5.1 mmol/L    Chloride 85 (L) 97 - 108 mmol/L    CO2 31 21 - 32 mmol/L    Anion gap 7 5 - 15 mmol/L    Glucose 124 (H) 65 - 100 mg/dL    BUN 33 (H) 6 - 20 MG/DL    Creatinine 0.52 (L) 0.55 - 1.02 MG/DL    BUN/Creatinine ratio 63 (H) 12 - 20      GFR est AA >60 >60 ml/min/1.73m2    GFR est non-AA >60 >60 ml/min/1.73m2    Calcium 8.6 8.5 - 10.1 MG/DL    Bilirubin, total 0.2 0.2 - 1.0 MG/DL    ALT (SGPT) 16 12 - 78 U/L    AST (SGOT) 15 15 - 37 U/L    Alk.  phosphatase 122 (H) 45 - 117 U/L    Protein, total 7.4 6.4 - 8.2 g/dL    Albumin 2.4 (L) 3.5 - 5.0 g/dL    Globulin 5.0 (H) 2.0 - 4.0 g/dL    A-G Ratio 0.5 (L) 1.1 - 2.2     CBC WITH AUTOMATED DIFF    Collection Time: 07/16/19  4:32 AM   Result Value Ref Range    WBC 9.7 3.6 - 11.0 K/uL    RBC 2.97 (L) 3.80 - 5.20 M/uL    HGB 9.4 (L) 11.5 - 16.0 g/dL    HCT 27.3 (L) 35.0 - 47.0 %    MCV 91.9 80.0 - 99.0 FL    MCH 31.6 26.0 - 34.0 PG    MCHC 34.4 30.0 - 36.5 g/dL    RDW 13.8 11.5 - 14.5 %    PLATELET 384 150 - 400 K/uL    MPV 9.7 8.9 - 12.9 FL    NRBC 0.0 0  WBC    ABSOLUTE NRBC 0.00 0.00 - 0.01 K/uL    NEUTROPHILS 80 (H) 32 - 75 %    LYMPHOCYTES 12 12 - 49 %    MONOCYTES 7 5 - 13 %    EOSINOPHILS 0 0 - 7 %    BASOPHILS 0 0 - 1 %    IMMATURE GRANULOCYTES 1 (H) 0.0 - 0.5 %    ABS. NEUTROPHILS 7.8 1.8 - 8.0 K/UL    ABS. LYMPHOCYTES 1.1 0.8 - 3.5 K/UL    ABS. MONOCYTES 0.7 0.0 - 1.0 K/UL    ABS. EOSINOPHILS 0.0 0.0 - 0.4 K/UL    ABS. BASOPHILS 0.0 0.0 - 0.1 K/UL    ABS. IMM.  GRANS. 0.1 (H) 0.00 - 0.04 K/UL    DF AUTOMATED     LIPASE    Collection Time: 07/16/19  4:32 AM   Result Value Ref Range    Lipase 149 73 - 393 U/L   URINE MICROSCOPIC ONLY    Collection Time: 07/16/19  4:32 AM   Result Value Ref Range    WBC >100 (H) 0 - 4 /hpf    RBC 20-50 0 - 5 /hpf    Epithelial cells FEW FEW /lpf    Bacteria 3+ (A) NEG /hpf   POC LACTIC ACID    Collection Time: 07/16/19  4:39 AM   Result Value Ref Range    Lactic Acid (POC) 1.47 0.40 - 2.00 mmol/L   CULTURE, BLOOD    Collection Time: 07/16/19  4:40 AM   Result Value Ref Range    Special Requests: NO SPECIAL REQUESTS      Culture result: NO GROWTH AFTER 3 HOURS     EKG, 12 LEAD, INITIAL    Collection Time: 07/16/19  4:48 AM   Result Value Ref Range    Ventricular Rate 113 BPM    Atrial Rate 107 BPM    QRS Duration 74 ms    Q-T Interval 334 ms    QTC Calculation (Bezet) 458 ms    Calculated R Axis -37 degrees    Diagnosis       ** Poor data quality, interpretation may be adversely affected  Accelerated Junctional rhythm  Left axis deviation  Minimal voltage criteria for LVH, may be normal variant  Nonspecific T wave abnormality  When compared with ECG of 07-JUL-2018 07:40,  Junctional rhythm has replaced Sinus rhythm  Nonspecific T wave abnormality now evident in Anterior leads     OCCULT BLOOD, STOOL    Collection Time: 07/16/19  5:27 AM   Result Value Ref Range    Occult blood, stool NEGATIVE  NEG

## 2019-07-16 NOTE — CDMP QUERY
Patient is noted to have a BMI of 38 kg/m2 ( 5'2\", 165 lbs ). If possible, please document in the progress notes and discharge summary if this patient is:  
 
=>Morbidly obese  
=>Obese =>Overweight (please include evaluation / treatment / management provided) =>Other explanation of clinical findings =>Clinically Undetermined (no explanation for clinical findings) REFERENCE: 
The [address] [place] [address] has issued a statement indicating that, \"Individuals who are obese or morbidly obese are at an increased risk for certain medical conditions when compared to persons of normal weight. Therefore, these conditions are always clinically significant and reportable when documented by the provider. \" Morbidly Obese:  BMI >/=40 or BMI >35 with obesity-related health condition  
      (e.g. Type 2 DM, HTN, DEANA, GERD, depression, OA weight bearing joints, urinary  
              stress incontinence, etc.) Obese/Obesity:  BMI 30 - 39.9 Overweight:  BMI 25 - 29.9 Thanks, Edwar Estevez Rn/CDMp

## 2019-07-16 NOTE — ED NOTES
TRANSFER - OUT REPORT:    Verbal report given to Carline(name) on June Lorren Amend  being transferred to PCU(unit) for routine progression of care       Report consisted of patients Situation, Background, Assessment and   Recommendations(SBAR). Information from the following report(s) SBAR, Kardex, ED Summary and Recent Results was reviewed with the receiving nurse. Lines:   Peripheral IV 07/16/19 Left Antecubital (Active)   Site Assessment Clean, dry, & intact 7/16/2019  6:15 AM   Phlebitis Assessment 0 7/16/2019  6:15 AM   Dressing Status Clean, dry, & intact 7/16/2019  6:15 AM   Dressing Type 4 X 4 7/16/2019  6:15 AM   Hub Color/Line Status Black 7/16/2019  6:15 AM   Alcohol Cap Used Yes 7/16/2019  6:15 AM       Peripheral IV 07/16/19 Right Antecubital (Active)   Site Assessment Clean, dry, & intact 7/16/2019  6:15 AM   Phlebitis Assessment 0 7/16/2019  6:15 AM   Infiltration Assessment 0 7/16/2019  6:15 AM   Dressing Status Clean, dry, & intact 7/16/2019  6:15 AM        Opportunity for questions and clarification was provided.       Patient transported with:   Monitor  Registered Nurse

## 2019-07-16 NOTE — ED NOTES
Pt came into the ER today from [facility]. She woke up this morning with vomitting blood and lower abdominal pain. We also noticed dark tarry stool when cleaning her up. Her BP was in the 52'N systolic and . The patient was given a fluid bolus. We also noticed her urine was thick and clotted when inserting a straight catheter. A delgado was ordered and inserted.

## 2019-07-16 NOTE — PROGRESS NOTES
TRANSFER - IN REPORT:    Verbal report received from Melita Mancia RN(name) on June Natale Hair  being received from ED(unit) for routine progression of care      Report consisted of patients Situation, Background, Assessment and   Recommendations(SBAR). Information from the following report(s) SBAR, Kardex, ED Summary, [facility] and Recent Results was reviewed with the receiving nurse. Opportunity for questions and clarification was provided. Assessment completed upon patients arrival to unit and care assumed. 1140: Pt given PO meds with applesauce after RN bedside dysphagia screen    1918: Bedside shift change report given to Estefania Santamaria RN (oncoming nurse) by Kyra De La Garza RN (offgoing nurse). Report included the following information SBAR, Kardex, Procedure Summary, Intake/Output and Recent Results.

## 2019-07-16 NOTE — PROGRESS NOTES
Initial Nutrition Assessment:    INTERVENTIONS/RECOMMENDATIONS:   · Enteral Nutrition:  Begin Jevity 1.5 ele continuous infusion at 25 ml/hr x 24 hr.  Increase by 10 ml/hr q8h as tolerated towards goal rate of 45 ml/hr x 24 hr.    · Add 1 pkt Prosource daily via PEG  · Flush with 100 ml free water q4h. · TF at goal rate + prosource + free water will provide daily approx. 1680 kcals, 83.9 g protein, and 1420 ml free water. · Maintain HOB elevated at least 30-45 degrees during infusion of TFs. · MD to determine if/when safe to administer oral liquids/pureed foods. ASSESSMENT:   7/16:  Chart reviewed; pt admitted with sepsis, UTI, acute respiratory failure and suspected GI bleed. Prior to admission, pt experienced hematemesis which appears to be resolving as H/H stabilizing. CORRY consulted for PEG feedings recommendations as pt was receiving tube feedings at [facility] secondary to dysphagia. Pt had PEG placed at [address] [address] [facility] [address]  CORRY spoke with Daughter over the phone and RN at [facility] to gather information TF regimen. Per RN \"Shine\", pt was receiving Jevity 1.5 continuous infusion at 45 ml/hr x 24 hr and taking some po thin liquids orally. Per RN and daughter, pt was also taking some oral pureed foods as desired but minimal oral intake (primary nutrition supplied via PEG). RD also confirmed current weight with RN jitendra AYALA; indicates 165.3 lbs. RD visited pt at the bedside; she was requesting water which was communicated to RN. MD prefers to leave pt NPO at this time and begin tube feedings. RD TF recommendations communicated  to MD and agreeable to begin Jevity 1.5 ele tube feedings. Diet Order: NPO  % Eaten:  No data found.   Pertinent Medications: [x]Reviewed []Other: FeSO4, Vitamin C, Folvite, remeron, seroquel, IVF-NaCl @ 100 ml/hr  Pertinent Labs: [x]Reviewed []Other: Na+ 123  Food Allergies: [x]None []Other   Last BM:    [x]Active     []Hyperactive  []Hypoactive       [] Absent BS  Skin:    [x] Intact   [] Incision  [] Breakdown  [] Other:    Anthropometrics:   Height: 5' 2\" (157.5 cm) Weight: 74.9 kg (165 lb 3 oz)   IBW (%IBW):   ( ) UBW (%UBW):   (  %)   Last Weight Metrics:  Weight Loss Metrics 7/16/2019 2/20/2019 11/27/2018 7/11/2018 7/4/2018 8/21/2017 4/17/2017   Today's Wt 165 lb 3 oz 145 lb 136 lb 162 lb 11.2 oz 164 lb 3.9 oz 158 lb 156 lb   BMI 30.21 kg/m2 26.52 kg/m2 24.87 kg/m2 29.76 kg/m2 30.04 kg/m2 28.44 kg/m2 28.08 kg/m2       BMI: Body mass index is 30.21 kg/m². This BMI is indicative of:   []Underweight    []Normal    [x]Overweight    [] Obesity   [] Extreme Obesity (BMI>40)     Estimated Nutrition Needs (Based on):   1671 Kcals/day(BMR (1393) x 1. 3AF) , 95 g(1.0 g/kg bw) Protein  Carbohydrate: At Least 130 g/day  Fluids: 1700 mL/day (1ml/kcal)    NUTRITION DIAGNOSES:   Problem:  Swallowing difficulty      Etiology: related to dysphagia     Signs/Symptoms: as evidenced by PEG feedings      NUTRITION INTERVENTIONS:  Meals/Snacks: General/healthful diet Enteral/Parenteral Nutrition: Initiate enteral nutrition                GOAL:   Pt will tolerate tube feedings at goal rate within 48-72 hrs    LEARNING NEEDS (Diet, Food/Nutrient-Drug Interaction):    [x] None Identified   [] Identified and Education Provided/Documented   [] Identified and Pt declined/was not appropriate     Cultureal, [place], [place] Ethnic Dietary Needs:    [x] None Identified   [] Identified and Addressed     [x] Interdisciplinary Care Plan Reviewed/Documented    [x] Discharge Planning: Continue PEG feedings per nursing home regimen (see above for details)      MONITORING /EVALUATION:   Food/Nutrient Intake Outcomes:  Total energy intake  Physical Signs/Symptoms Outcomes: Weight/weight change, GI profile, Glucose profile    NUTRITION RISK:    [x] Patient At Nutritional Risk    [] Patient Not At Nutritional Risk    PT SEEN FOR:    [x]  MD Consult: []Calorie Count      []Diabetic Diet Education        []Diet Education     []Electrolyte Management     []General Nutrition Management and Supplements     [x]Management of Tube Feeding     []TPN Recommendations    []  RN Referral:  []MST score >=2     []Enteral/Parenteral Nutrition PTA     []Pregnant: Gestational DM or Multigestation     []Pressure Ulcer/Wound Care needs        []  Low BMI  []  Jaxon Caceres  Pager [phone]  Weekend Pager [phone]

## 2019-07-16 NOTE — ED PROVIDER NOTES
EMERGENCY DEPARTMENT HISTORY AND PHYSICAL EXAM      Date: 7/16/2019  Patient Name: Duane Finney    History of Presenting Illness     Chief Complaint   Patient presents with    Vomiting     pt states she woke up around 3am vomitting red blood. pt has a history of delusional disorders, hypertension, schizoprenia and arhtiris,        History Provided By: Patient    HPI: Elaine Mary, 70 y.o. female with PMHx significant for depression, HTN, RA, who presents with a chief complaint of vomiting. Patient is in a nursing facility and daughter reports that she got a call stating the patient had been vomiting blood. Patient herself just complains of her chest burning. Daughter reports that she also has some mental health issues that are being managed by the nursing facility. Also reports they noticed a rash to her lower extremities 3 days ago. Patient also has a PEG tube that was placed for dysphasia. Patient states she has been getting her tube feeds as normal.      PCP: Kimberly Russell MD    There are no other complaints, changes, or physical findings at this time.     Current Facility-Administered Medications   Medication Dose Route Frequency Provider Last Rate Last Dose    sodium chloride (NS) flush 5-10 mL  5-10 mL IntraVENous PRN Jack Ko MD        cefTRIAXone (ROCEPHIN) 1 g in 0.9% sodium chloride (MBP/ADV) 50 mL  1 g IntraVENous NOW Jack Ko MD         Current Outpatient Medications   Medication Sig Dispense Refill    HUMIRA PEN 40 mg/0.8 mL injection pen INJECT 40MG(1 PEN) UNDER SKIN EVERY WEEK. 4 Kit 4    HUMIRA PEN 40 mg/0.8 mL injection pen INJECT 40MG(1 PEN) UNDER SKIN EVERY WEEK. 4 Kit 4    atorvastatin (LIPITOR) 10 mg tablet       clomiPRAMINE (ANAFRANIL) 75 mg capsule       desoximetasone (TOPICORT) 0.25 % topical cream       dilTIAZem CD (CARDIZEM CD) 180 mg ER capsule       doxycycline (VIBRA-TABS) 100 mg tablet       albuterol-ipratropium (DUO-NEB) 2.5 mg-0.5 mg/3 ml nebu       naproxen (NAPROSYN) 500 mg tablet       levoFLOXacin (LEVAQUIN) 500 mg tablet Take 1 Tab by mouth daily. 2 Tab 0    docusate sodium (COLACE) 100 mg capsule Take 100 mg by mouth two (2) times a day.  naloxone (NARCAN) 0.4 mg/mL injection 0.4 mg by IntraVENous route as needed.  clonazePAM (KLONOPIN) 1 mg tablet Take 1 mg by mouth two (2) times a day.  levothyroxine (SYNTHROID) 75 mcg tablet Take 37.5 mcg by mouth Daily (before breakfast).  traZODone (DESYREL) 100 mg tablet Take 200 mg by mouth nightly.  QUEtiapine (SEROQUEL) 100 mg tablet Take 50 mg by mouth two (2) times a day.  HYDROcodone-acetaminophen (NORCO) 5-325 mg per tablet Take  by mouth every six (6) hours as needed for Pain.  multivitamin (ONE A DAY) tablet Take 1 Tab by mouth daily.  ferrous sulfate (IRON, FERROUS SULFATE,) 325 mg (65 mg iron) tablet Take 1 Tab by mouth two (2) times a day. 30 Tab 0    pantoprazole (PROTONIX) 40 mg tablet Take 1 Tab by mouth Daily (before breakfast). 30 Tab 0    venlafaxine-SR (EFFEXOR-XR) 150 mg capsule Take  by mouth daily.  folic acid (FOLVITE) 1 mg tablet Take  by mouth daily.  polyvinyl alcohol (LIQUIFILM TEARS) 1.4 % ophthalmic solution Administer 1 Drop to both eyes as needed.  acetaminophen (TYLENOL) 325 mg tablet Take  by mouth every four (4) hours as needed for Pain.  lovastatin (MEVACOR) 40 mg tablet Take 40 mg by mouth daily.  metoprolol (LOPRESSOR) 25 mg tablet Take 25 mg by mouth two (2) times a day.  mirtazapine (REMERON) 45 mg tablet Take 45 mg by mouth nightly.  prednisoLONE acetate (PRED FORTE) 1 % ophthalmic suspension Administer 1 Drop to left eye three (3) times daily.        Past History     Past Medical History:  Past Medical History:   Diagnosis Date    Baker's cyst of knee     Depression     Hypertension     RA (rheumatoid arthritis) ([address]      Past Surgical History:  Past Surgical History:   Procedure Laterality Date    COLONOSCOPY N/A 2/28/2017    COLONOSCOPY [facility] Shanna Albrecht MD [facility] ENDOSCOPY    HX HERNIA REPAIR      HX UROLOGICAL      \"bladder tack\"     Family History:  No family history on file. Social History:  Social History     Tobacco Use    Smoking status: Former Smoker    Smokeless tobacco: Never Used   Substance Use Topics    Alcohol use: No    Drug use: No     Allergies: Allergies   Allergen Reactions    Codeine Nausea Only     Review of Systems   Review of Systems   Constitutional: Negative for chills and fever. HENT: Negative for congestion, rhinorrhea and sore throat. Respiratory: Negative for cough and shortness of breath. Cardiovascular: Negative for chest pain. Chest burning   Gastrointestinal: Positive for vomiting. Negative for abdominal pain and nausea. Genitourinary: Negative for dysuria and urgency. Skin: Negative for rash. Neurological: Negative for dizziness, light-headedness and headaches. All other systems reviewed and are negative. Physical Exam   Physical Exam   Constitutional: She is oriented to person, place, and time. She appears well-developed and well-nourished. No distress. HENT:   Head: Normocephalic and atraumatic. Eyes: Pupils are equal, round, and reactive to light. Conjunctivae and EOM are normal.   Neck: Normal range of motion. Cardiovascular: Regular rhythm and intact distal pulses. Tachycardia present. Pulmonary/Chest: Effort normal and breath sounds normal. No stridor. No respiratory distress. Abdominal: Soft. She exhibits no distension. There is no tenderness. PEG in LUQ   Musculoskeletal: Normal range of motion. Arthritic changes to b/l hands   Neurological: She is alert and oriented to person, place, and time. Skin: Skin is warm and dry. Rash (petechiae over b/l LE) noted. Psychiatric: She has a normal mood and affect. Nursing note and vitals reviewed.     Diagnostic Study Results   Labs - Recent Results (from the past 12 hour(s))   URINALYSIS W/ RFLX MICROSCOPIC    Collection Time: 07/16/19  4:32 AM   Result Value Ref Range    Color YELLOW/STRAW      Appearance TURBID (A) CLEAR      Specific gravity 1.020 1.003 - 1.030      pH (UA) 8.0 5.0 - 8.0      Protein NEGATIVE  NEG mg/dL    Glucose NEGATIVE  NEG mg/dL    Ketone NEGATIVE  NEG mg/dL    Blood LARGE (A) NEG      Urobilinogen 0.2 0.2 - 1.0 EU/dL    Nitrites NEGATIVE  NEG      Leukocyte Esterase LARGE (A) NEG      Bilirubin UA, confirm POSITIVE (A) NEG     METABOLIC PANEL, COMPREHENSIVE    Collection Time: 07/16/19  4:32 AM   Result Value Ref Range    Sodium 123 (L) 136 - 145 mmol/L    Potassium 4.2 3.5 - 5.1 mmol/L    Chloride 85 (L) 97 - 108 mmol/L    CO2 31 21 - 32 mmol/L    Anion gap 7 5 - 15 mmol/L    Glucose 124 (H) 65 - 100 mg/dL    BUN 33 (H) 6 - 20 MG/DL    Creatinine 0.52 (L) 0.55 - 1.02 MG/DL    BUN/Creatinine ratio 63 (H) 12 - 20      GFR est AA >60 >60 ml/min/1.73m2    GFR est non-AA >60 >60 ml/min/1.73m2    Calcium 8.6 8.5 - 10.1 MG/DL    Bilirubin, total 0.2 0.2 - 1.0 MG/DL    ALT (SGPT) 16 12 - 78 U/L    AST (SGOT) 15 15 - 37 U/L    Alk. phosphatase 122 (H) 45 - 117 U/L    Protein, total 7.4 6.4 - 8.2 g/dL    Albumin 2.4 (L) 3.5 - 5.0 g/dL    Globulin 5.0 (H) 2.0 - 4.0 g/dL    A-G Ratio 0.5 (L) 1.1 - 2.2     CBC WITH AUTOMATED DIFF    Collection Time: 07/16/19  4:32 AM   Result Value Ref Range    WBC 9.7 3.6 - 11.0 K/uL    RBC 2.97 (L) 3.80 - 5.20 M/uL    HGB 9.4 (L) 11.5 - 16.0 g/dL    HCT 27.3 (L) 35.0 - 47.0 %    MCV 91.9 80.0 - 99.0 FL    MCH 31.6 26.0 - 34.0 PG    MCHC 34.4 30.0 - 36.5 g/dL    RDW 13.8 11.5 - 14.5 %    PLATELET 111 344 - 386 K/uL    MPV 9.7 8.9 - 12.9 FL    NRBC 0.0 0  WBC    ABSOLUTE NRBC 0.00 0.00 - 0.01 K/uL    NEUTROPHILS 80 (H) 32 - 75 %    LYMPHOCYTES 12 12 - 49 %    MONOCYTES 7 5 - 13 %    EOSINOPHILS 0 0 - 7 %    BASOPHILS 0 0 - 1 %    IMMATURE GRANULOCYTES 1 (H) 0.0 - 0.5 %    ABS.  NEUTROPHILS 7. 8 1.8 - 8.0 K/UL    ABS. LYMPHOCYTES 1.1 0.8 - 3.5 K/UL    ABS. MONOCYTES 0.7 0.0 - 1.0 K/UL    ABS. EOSINOPHILS 0.0 0.0 - 0.4 K/UL    ABS. BASOPHILS 0.0 0.0 - 0.1 K/UL    ABS. IMM. GRANS. 0.1 (H) 0.00 - 0.04 K/UL    DF AUTOMATED     LIPASE    Collection Time: 07/16/19  4:32 AM   Result Value Ref Range    Lipase 149 73 - 393 U/L   URINE MICROSCOPIC ONLY    Collection Time: 07/16/19  4:32 AM   Result Value Ref Range    WBC >100 (H) 0 - 4 /hpf    RBC 20-50 0 - 5 /hpf    Epithelial cells FEW FEW /lpf    Bacteria 3+ (A) NEG /hpf   OCCULT BLOOD, STOOL    Collection Time: 07/16/19  5:27 AM   Result Value Ref Range    Occult blood, stool NEGATIVE  NEG         Radiologic Studies -   XR CHEST PORT   Final Result   IMPRESSION:      New right lung base atelectasis versus pneumonia. Chronic interstitial lung   disease. Erosions of the left glenohumeral joint are new and represent infectious or   inflammatory arthritis. Consider dedicated shoulder views. Consider PA and lateral chest views when the patient can better tolerate. Xr Chest Port    Result Date: 7/16/2019  IMPRESSION: New right lung base atelectasis versus pneumonia. Chronic interstitial lung disease. Erosions of the left glenohumeral joint are new and represent infectious or inflammatory arthritis. Consider dedicated shoulder views. Consider PA and lateral chest views when the patient can better tolerate. Medical Decision Making   I am the first provider for this patient. I reviewed the vital signs, available nursing notes, past medical history, past surgical history, family history and social history. Vital Signs-Reviewed the patient's vital signs.   Patient Vitals for the past 12 hrs:   Temp Pulse Resp BP SpO2   07/16/19 0518  (!) 113 19 100/52 98 %   07/16/19 0515  (!) 113 25 (!) 58/22 (!) 88 %   07/16/19 0500  (!) 111 18 99/57 (!) 88 %   07/16/19 0445  (!) 115 19 (!) 84/65 97 %   07/16/19 0411 98.7 °F (37.1 °C) (!) 130 22 (!) 84/37 96 %       Pulse Oximetry Analysis - 98% on RA    Cardiac Monitor:   Rate: 130 bpm  Rhythm: Sinus Tachycardia      ED EKG interpretation:  Rhythm: sinus tachycardia; and regular . Rate (approx.): 113; Axis: left; P wave: normal; QRS interval: normal ; ST/T wave: non-specific changes; Other findings: abnormal ekg. This EKG was interpreted by DARRON Dietrich MD,ED Provider. Records Reviewed: Nursing Notes and Old Medical Records    Provider Notes (Medical Decision Making):   Ddx: gi bleeding, gastritis, sepsis - uti, pna, electrolyte imbalance, dehydration    Plan for basic labs, lactate, blood cx, UA, cxr, occult blood stool      ED Course:   Initial assessment performed. The patients presenting problems have been discussed, and they are in agreement with the care plan formulated and outlined with them. I have encouraged them to ask questions as they arise throughout their visit. UA with signs of UTI  BP improved with fluids  Hgb stable  Pt with no vomiting in the ED    Spoke with Dr. Becky Johnson, hospitalist, for admission  Pt and family updated on plan    Critical Care:  CRITICAL CARE NOTE :  7:11 AM  IMPENDING DETERIORATION -Airway, Respiratory and Cardiovascular  ASSOCIATED RISK FACTORS - Hypotension, Shock, Bleeding and Metabolic changes  MANAGEMENT- Bedside Assessment  INTERPRETATION -  Xrays, ECG and Blood Pressure  INTERVENTIONS - hemodynamic mngmt and Metobolic interventions  CASE REVIEW - Hospitalist  TREATMENT RESPONSE -Improved  PERFORMED BY - Self    NOTES   :    I have spent 45 minutes of critical care time involved in lab review, consultations with specialist, family decision- making, bedside attention and documentation. During this entire length of time I was immediately available to the patient . Jayson Flood MD      Disposition:  Admission    Diagnosis     Clinical Impression:   1. Sepsis, due to unspecified organism ([facility] [address]    2.  Acute UTI        This note will not be viewable in [employer] [facility]. Please note that this dictation was completed with [employer] System, the computer voice recognition software. Quite often unanticipated grammatical, syntax, homophones, and other interpretive errors are inadvertently transcribed by the computer software. Please disregard these errors.   Please excuse any errors that have escaped final proofreading

## 2019-07-16 NOTE — ED NOTES
Daughters Mace Phalen and [facility] informed of patient transfer to room [phone]

## 2019-07-17 LAB
ABO + RH BLD: NORMAL
ANION GAP SERPL CALC-SCNC: 5 MMOL/L (ref 5–15)
ANION GAP SERPL CALC-SCNC: 8 MMOL/L (ref 5–15)
BASOPHILS # BLD: 0 K/UL (ref 0–0.1)
BASOPHILS NFR BLD: 0 % (ref 0–1)
BLOOD GROUP ANTIBODIES SERPL: NORMAL
BUN SERPL-MCNC: 17 MG/DL (ref 6–20)
BUN SERPL-MCNC: 17 MG/DL (ref 6–20)
BUN/CREAT SERPL: 46 (ref 12–20)
BUN/CREAT SERPL: 49 (ref 12–20)
CALCIUM SERPL-MCNC: 7.8 MG/DL (ref 8.5–10.1)
CALCIUM SERPL-MCNC: 7.9 MG/DL (ref 8.5–10.1)
CHLORIDE SERPL-SCNC: 102 MMOL/L (ref 97–108)
CHLORIDE SERPL-SCNC: 104 MMOL/L (ref 97–108)
CO2 SERPL-SCNC: 25 MMOL/L (ref 21–32)
CO2 SERPL-SCNC: 26 MMOL/L (ref 21–32)
CREAT SERPL-MCNC: 0.35 MG/DL (ref 0.55–1.02)
CREAT SERPL-MCNC: 0.37 MG/DL (ref 0.55–1.02)
DIFFERENTIAL METHOD BLD: ABNORMAL
EOSINOPHIL # BLD: 0.2 K/UL (ref 0–0.4)
EOSINOPHIL NFR BLD: 3 % (ref 0–7)
ERYTHROCYTE [DISTWIDTH] IN BLOOD BY AUTOMATED COUNT: 14.1 % (ref 11.5–14.5)
GLUCOSE BLD STRIP.AUTO-MCNC: 101 MG/DL (ref 65–100)
GLUCOSE BLD STRIP.AUTO-MCNC: 110 MG/DL (ref 65–100)
GLUCOSE BLD STRIP.AUTO-MCNC: 120 MG/DL (ref 65–100)
GLUCOSE BLD STRIP.AUTO-MCNC: 127 MG/DL (ref 65–100)
GLUCOSE BLD STRIP.AUTO-MCNC: 129 MG/DL (ref 65–100)
GLUCOSE SERPL-MCNC: 104 MG/DL (ref 65–100)
GLUCOSE SERPL-MCNC: 120 MG/DL (ref 65–100)
HCT VFR BLD AUTO: 25.2 % (ref 35–47)
HGB BLD-MCNC: 8.3 G/DL (ref 11.5–16)
IMM GRANULOCYTES # BLD AUTO: 0 K/UL (ref 0–0.04)
IMM GRANULOCYTES NFR BLD AUTO: 0 % (ref 0–0.5)
LYMPHOCYTES # BLD: 0.9 K/UL (ref 0.8–3.5)
LYMPHOCYTES NFR BLD: 13 % (ref 12–49)
MCH RBC QN AUTO: 31.2 PG (ref 26–34)
MCHC RBC AUTO-ENTMCNC: 32.9 G/DL (ref 30–36.5)
MCV RBC AUTO: 94.7 FL (ref 80–99)
MONOCYTES # BLD: 0.5 K/UL (ref 0–1)
MONOCYTES NFR BLD: 8 % (ref 5–13)
NEUTS SEG # BLD: 5.1 K/UL (ref 1.8–8)
NEUTS SEG NFR BLD: 76 % (ref 32–75)
NRBC # BLD: 0 K/UL (ref 0–0.01)
NRBC BLD-RTO: 0 PER 100 WBC
PLATELET # BLD AUTO: 302 K/UL (ref 150–400)
PMV BLD AUTO: 9.8 FL (ref 8.9–12.9)
POTASSIUM SERPL-SCNC: 3.9 MMOL/L (ref 3.5–5.1)
POTASSIUM SERPL-SCNC: 3.9 MMOL/L (ref 3.5–5.1)
RBC # BLD AUTO: 2.66 M/UL (ref 3.8–5.2)
SERVICE CMNT-IMP: ABNORMAL
SODIUM SERPL-SCNC: 135 MMOL/L (ref 136–145)
SODIUM SERPL-SCNC: 135 MMOL/L (ref 136–145)
SPECIMEN EXP DATE BLD: NORMAL
WBC # BLD AUTO: 6.8 K/UL (ref 3.6–11)

## 2019-07-17 PROCEDURE — 80048 BASIC METABOLIC PNL TOTAL CA: CPT

## 2019-07-17 PROCEDURE — 74011250637 HC RX REV CODE- 250/637: Performed by: INTERNAL MEDICINE

## 2019-07-17 PROCEDURE — 65660000000 HC RM CCU STEPDOWN

## 2019-07-17 PROCEDURE — 74011000258 HC RX REV CODE- 258: Performed by: INTERNAL MEDICINE

## 2019-07-17 PROCEDURE — 74011000250 HC RX REV CODE- 250: Performed by: HOSPITALIST

## 2019-07-17 PROCEDURE — 36415 COLL VENOUS BLD VENIPUNCTURE: CPT

## 2019-07-17 PROCEDURE — 77030038269 HC DRN EXT URIN PURWCK BARD -A

## 2019-07-17 PROCEDURE — 82962 GLUCOSE BLOOD TEST: CPT

## 2019-07-17 PROCEDURE — 74011250636 HC RX REV CODE- 250/636: Performed by: INTERNAL MEDICINE

## 2019-07-17 PROCEDURE — 74011250636 HC RX REV CODE- 250/636: Performed by: HOSPITALIST

## 2019-07-17 PROCEDURE — 77030018798 HC PMP KT ENTRL FED COVD -A

## 2019-07-17 PROCEDURE — 74011000250 HC RX REV CODE- 250: Performed by: INTERNAL MEDICINE

## 2019-07-17 PROCEDURE — 74011250636 HC RX REV CODE- 250/636: Performed by: FAMILY MEDICINE

## 2019-07-17 PROCEDURE — C9113 INJ PANTOPRAZOLE SODIUM, VIA: HCPCS | Performed by: INTERNAL MEDICINE

## 2019-07-17 PROCEDURE — 85025 COMPLETE CBC W/AUTO DIFF WBC: CPT

## 2019-07-17 PROCEDURE — 86900 BLOOD TYPING SEROLOGIC ABO: CPT

## 2019-07-17 RX ORDER — FLUOCINONIDE 0.5 MG/G
CREAM TOPICAL 2 TIMES DAILY
Status: DISCONTINUED | OUTPATIENT
Start: 2019-07-17 | End: 2019-07-17

## 2019-07-17 RX ORDER — FLUOCINONIDE CREAM (EMULSIFIED BASE) 0.5 MG/G
CREAM TOPICAL 2 TIMES DAILY
Status: DISCONTINUED | OUTPATIENT
Start: 2019-07-17 | End: 2019-07-19 | Stop reason: HOSPADM

## 2019-07-17 RX ADMIN — FERROUS SULFATE TAB 325 MG (65 MG ELEMENTAL FE) 325 MG: 325 (65 FE) TAB at 08:16

## 2019-07-17 RX ADMIN — CLOMIPRAMINE HYDROCHLORIDE 25 MG: 25 CAPSULE ORAL at 08:16

## 2019-07-17 RX ADMIN — ATORVASTATIN CALCIUM 10 MG: 10 TABLET, FILM COATED ORAL at 21:41

## 2019-07-17 RX ADMIN — VENLAFAXINE HYDROCHLORIDE 150 MG: 37.5 CAPSULE, EXTENDED RELEASE ORAL at 08:15

## 2019-07-17 RX ADMIN — Medication 10 ML: at 06:09

## 2019-07-17 RX ADMIN — SODIUM CHLORIDE 40 MG: 9 INJECTION, SOLUTION INTRAMUSCULAR; INTRAVENOUS; SUBCUTANEOUS at 08:16

## 2019-07-17 RX ADMIN — DILTIAZEM HYDROCHLORIDE 180 MG: 180 CAPSULE, COATED, EXTENDED RELEASE ORAL at 08:16

## 2019-07-17 RX ADMIN — FLUOCINONIDE: 0.5 CREAM TOPICAL at 23:23

## 2019-07-17 RX ADMIN — SODIUM CHLORIDE 40 MG: 9 INJECTION, SOLUTION INTRAMUSCULAR; INTRAVENOUS; SUBCUTANEOUS at 21:41

## 2019-07-17 RX ADMIN — QUETIAPINE FUMARATE 50 MG: 25 TABLET ORAL at 18:38

## 2019-07-17 RX ADMIN — HALOPERIDOL LACTATE 1 MG: 5 INJECTION INTRAMUSCULAR at 19:26

## 2019-07-17 RX ADMIN — CLONAZEPAM 1 MG: 1 TABLET ORAL at 18:38

## 2019-07-17 RX ADMIN — FERROUS SULFATE TAB 325 MG (65 MG ELEMENTAL FE) 325 MG: 325 (65 FE) TAB at 18:38

## 2019-07-17 RX ADMIN — CEFTRIAXONE 1 G: 1 INJECTION, POWDER, FOR SOLUTION INTRAMUSCULAR; INTRAVENOUS at 08:15

## 2019-07-17 RX ADMIN — VANCOMYCIN HYDROCHLORIDE 1000 MG: 1 INJECTION, POWDER, LYOPHILIZED, FOR SOLUTION INTRAVENOUS at 08:16

## 2019-07-17 RX ADMIN — FOLIC ACID 1 MG: 1 TABLET ORAL at 08:16

## 2019-07-17 RX ADMIN — PRAVASTATIN SODIUM 40 MG: 40 TABLET ORAL at 21:41

## 2019-07-17 RX ADMIN — MIRTAZAPINE 45 MG: 15 TABLET, FILM COATED ORAL at 21:41

## 2019-07-17 RX ADMIN — QUETIAPINE FUMARATE 50 MG: 25 TABLET ORAL at 08:16

## 2019-07-17 RX ADMIN — HALOPERIDOL LACTATE 1 MG: 5 INJECTION INTRAMUSCULAR at 06:51

## 2019-07-17 RX ADMIN — PREDNISOLONE ACETATE 1 DROP: 10 SUSPENSION/ DROPS OPHTHALMIC at 08:16

## 2019-07-17 RX ADMIN — PREDNISOLONE ACETATE 1 DROP: 10 SUSPENSION/ DROPS OPHTHALMIC at 16:00

## 2019-07-17 RX ADMIN — CLONAZEPAM 1 MG: 1 TABLET ORAL at 08:16

## 2019-07-17 RX ADMIN — PREDNISOLONE ACETATE 1 DROP: 10 SUSPENSION/ DROPS OPHTHALMIC at 21:42

## 2019-07-17 RX ADMIN — Medication 10 ML: at 21:41

## 2019-07-17 RX ADMIN — OLANZAPINE 5 MG: 10 INJECTION, POWDER, FOR SOLUTION INTRAMUSCULAR at 22:13

## 2019-07-17 RX ADMIN — LEVOTHYROXINE SODIUM 37.5 MCG: 75 TABLET ORAL at 06:08

## 2019-07-17 NOTE — PROGRESS NOTES
1915: Verbal bedside report received from Roberth Veliz RN (offgoing nurse) given to Jose Juan Jeronimo RN (oncoming nurse). Bedside report included MAR, Kardex, cardiac rhythm sinus tach, and updated intake/ouput. Pt resting in bed, MEWS score 2. No visible distress noted. 1925: Pt beginning to show increased signs of agitation, calling out for Manish, and stating, Deandre Wilkerson is here. He is a liar. \" RN to provide prn Haldol 1mg for agitation. Will continue to monitor. 2045: Pt showing signs of continued increased agitation. Pt hear screaming from down the hallway for her daughter, \"SUSANNE\". Demanding for RN to read scriptures from the bible for her. Pt observed to have urine-soaked sheet. RN and tech provided full linen change and new purewick. 2115: Telehospitalist paged to request additional assistance with medications for pt to relax. Pt currently screaming and calling out approximately every 5 minutes. 2200: Telehospitalist provider Diego placed order for 5mg Zyprexa IM. RN to administer for pt's increased agitation. RN will continue to monitor pt after administration for any potential side effects. 0000: RN observed pt to be resting more calmly, VSS. No apparent distress. MEWS score 3 due to baseline sinus tach. Pt remains afebrile. 0600: Pt has remained rested for duration of evening. VSS. No major outbursts since RN administration of Zyprexa, however pt continues to have periodic episodes of confusion/ delusional thought processes. 0720: Verbal bedside report given to Nelson Narvaez RN (oncoming nurse). Bedside report included MAR, Kardex, intake/output and cardiac rhythm sinus tach. Pt resting in bed, no visible distress noted.

## 2019-07-17 NOTE — PROGRESS NOTES
Hospitalist Progress Note    NAME: Elaine Mary   :  1940   MRN:  [id number]       Assessment / Plan:  Severe sepsis, POA, likely secondary to UTI  Acute hypoxic respiratory failure  -UA suggestive of UTI, will f/u with Ucx  -CXR with new right lung base atelectasis versus pneumonia. Chronic interstitial lung  Disease. Erosions of the left glenohumeral joint are new and represent infectious or  inflammatory arthritis. Consider dedicated shoulder views.  -obtain CT a/p for further evaluation   -empiric abx vanc/rocephin  -nebs, O2 suppl prn    :  CT Abdo showed clear lung bases despite what was read on chest DX - therefore will discontinue Vancomycin and treat UTI with Ceftriaxone. Follow UCx  Continue O2 supplementation, wean as tolearted     Hyponatremia - resolved  Bloody emesis concerning for GI bleed   -daughter states pt either cough up or vomiting dark blood  -start IV PPI and monitor for further emesis as her hgb is stable and FOBT neg  -monitor, hold GI consultation for now    :  No further recurrence and am doubtful of the initial history altogether. Hgb declined, as did the rest of the cell lines so likely dilutional. Will continue monitoring     Schizophrenia  -Prone to loud outbursts and and erratic speech involving Satan  -Resume home anti-psychotic meds    RA with joints deformities  -stable, bedbound at baseline     HTN  -hold home meds due to hypotension     Dysphagia s/p PEG  -nutrition consult for TF     RA (rheumatoid arthritis)   Humira Q week     Anxiety and Depression  Cont' home meds     B/l LE rash  -daughter states rash improving after pt taken off risperidone  -monitor for now    30.0 - 39.9 Obese / Body mass index is 30.09 kg/m². Code status: Full  Prophylaxis: SCD's  Recommended Disposition: NH, bed bound     Subjective:     Chief Complaint / Reason for Physician Visit  Denies any complaints. Discussed with RN events overnight.      Review of Systems:  Symptom Y/N Comments  Symptom Y/N Comments   Fever/Chills n   Chest Pain n    Poor Appetite    Edema     Cough    Abdominal Pain n    Sputum    Joint Pain     SOB/MADERA n   Pruritis/Rash     Nausea/vomit    Tolerating PT/OT     Diarrhea    Tolerating Diet     Constipation    Other       Could NOT obtain due to:      Objective:     VITALS:   Last 24hrs VS reviewed since prior progress note. Most recent are:  Patient Vitals for the past 24 hrs:   Temp Pulse Resp BP SpO2   07/17/19 1052 98.3 °F (36.8 °C) (!) 105 18 131/65 100 %   07/17/19 0736 98.1 °F (36.7 °C) (!) 105 20 112/60 95 %   07/17/19 0300 98.5 °F (36.9 °C) (!) 109 21 122/53 100 %   07/16/19 2248 98.2 °F (36.8 °C) (!) 111 20 137/77 100 %   07/16/19 1930 98.1 °F (36.7 °C) (!) 108 20 135/62 100 %   07/16/19 1926 98.1 °F (36.7 °C) (!) 109 20 135/62 99 %   07/16/19 1555 97 °F (36.1 °C) (!) 118 24 119/73 100 %   07/16/19 1117 98.2 °F (36.8 °C) (!) 130 20 126/70 99 %       Intake/Output Summary (Last 24 hours) at 7/17/2019 1113  Last data filed at 7/17/2019 5440  Gross per 24 hour   Intake 2293.33 ml   Output 4650 ml   Net -2356.67 ml        PHYSICAL EXAM:  General: WD, WN. Alert, cooperative, no acute distress    EENT:  EOMI. Anicteric sclerae. MMM  Resp:  CTA bilaterally, no wheezing or rales. No accessory muscle use  CV:  Regular  rhythm,  No edema  GI:  Soft, Non distended, Non tender.  +Bowel sounds  Neurologic:  Alert, abnormal speech involving Satanism   Psych:   Poor insight  Skin:  No rashes.   No jaundice    Reviewed most current lab test results and cultures  YES  Reviewed most current radiology test results   YES  Review and summation of old records today    NO  Reviewed patient's current orders and MAR    YES  PMH/SH reviewed - no change compared to H&P  ________________________________________________________________________  Care Plan discussed with:    Comments   Patient x    Family      RN x    Care Manager     Consultant                        Multidiciplinary team rounds were held today with , nursing, pharmacist and clinical coordinator. Patient's plan of care was discussed; medications were reviewed and discharge planning was addressed. ________________________________________________________________________  Total NON critical care TIME:  35   Minutes    Total CRITICAL CARE TIME Spent:   Minutes non procedure based      Comments   >50% of visit spent in counseling and coordination of care     ________________________________________________________________________  Brittni Stallworth MD     Procedures: see electronic medical records for all procedures/Xrays and details which were not copied into this note but were reviewed prior to creation of Plan. LABS:  I reviewed today's most current labs and imaging studies.   Pertinent labs include:  Recent Labs     07/17/19  0205 07/16/19  0432   WBC 6.8 9.7   HGB 8.3* 9.4*   HCT 25.2* 27.3*    384     Recent Labs     07/17/19  0535 07/17/19  0205 07/16/19  2120 07/16/19  0432   * 135* 133* 123*   K 3.9 3.9 3.5 4.2    104 100 85*   CO2 25 26 25 31   * 104* 101* 124*   BUN 17 17 18 33*   CREA 0.35* 0.37* 0.28* 0.52*   CA 7.9* 7.8* 7.7* 8.6   ALB  --   --   --  2.4*   TBILI  --   --   --  0.2   SGOT  --   --   --  15   ALT  --   --   --  16       Signed: Brittni Stallworth MD

## 2019-07-17 NOTE — PROGRESS NOTES
Bedside shift change report given to Annmarie Boyd RN (oncoming nurse) by Ja Marcus RN (offgoing nurse). Report included the following information SBAR, Kardex, Intake/Output, MAR and Recent Results. 1114: Chand catheter removed. Pt tolerated well. Bedside shift change report given to Ja Marcus RN (oncoming nurse) by Annmarie Boyd RN (offgoing nurse). Report included the following information SBAR, Kardex, Intake/Output, MAR and Recent Results.

## 2019-07-17 NOTE — PROGRESS NOTES
1900: Verbal bedside report received from [facility] cullen RN (offgoing nurse) given to Janelle Gomes RN (oncoming nurse). Bedside report included Kardex, MAR, intake/ouput and diet order NPO except tube feedings. Pt resting in bed, daughter Dariela Cid at bedside. MEWS score 2, no visible distress noted. 2100: RN looked over chart, and realized 1400 BMP lab was not drawn. RN glen the overdue lab at [facility]. Sodium level pending. Will contact provider pending results. 2200: Pt per day shift reports having periods of confusion, stating she \"is giving her life back to God\" and that \"Satan is here\". RN this evening has observed increased agitation, including shouting out. RN attempted comfort measures, such as repositioning, dim lights, prayer with pt and calling daught per pt request to check in before bedtime. Pt still observed to shout out, stating \"I love God. Margy Wilson is here. \" Provider paged for medication to assist patient with relaxation. 2215: Provider Mushtaq Robert to approve orders for Haldol 1mg Q6 prn agitation. Provider also alerted of sodium level change from 123 to 133 once labs came back at 2200.     2300: Provider Mushtaq Robert contacted RN to verfiy telephone with readback orders to d/c the NS. RN to place orders for starting D5W at 50mL/hr. Orders for BMP to be done Q4. Will alert day shift of this change. 0500: Chart updated to reflect pt POA PEG tube. Tube feedings charted best to RN's knowledge of this evening's shift. 0540: New 20g peripheral IV placed in pt's right hand. 0645: Pt showing increased signs of agitation and restlessness. Haldol 1mg given prn agitation. 0725: Verbal bedside report given to Sunday Mayorga RN (oncoming nurse). Bedside report included Kardex, MAR, intake/output, cardiac rhythm sinus tach, and pt's episodes of agitation overnight. Pt resting in bed, TV and dim lights provided for distraction.

## 2019-07-18 LAB
GLUCOSE BLD STRIP.AUTO-MCNC: 110 MG/DL (ref 65–100)
GLUCOSE BLD STRIP.AUTO-MCNC: 110 MG/DL (ref 65–100)
GLUCOSE BLD STRIP.AUTO-MCNC: 123 MG/DL (ref 65–100)
GLUCOSE BLD STRIP.AUTO-MCNC: 126 MG/DL (ref 65–100)
SERVICE CMNT-IMP: ABNORMAL

## 2019-07-18 PROCEDURE — 77030038269 HC DRN EXT URIN PURWCK BARD -A

## 2019-07-18 PROCEDURE — C9113 INJ PANTOPRAZOLE SODIUM, VIA: HCPCS | Performed by: INTERNAL MEDICINE

## 2019-07-18 PROCEDURE — 74011000250 HC RX REV CODE- 250: Performed by: INTERNAL MEDICINE

## 2019-07-18 PROCEDURE — 74011250637 HC RX REV CODE- 250/637: Performed by: INTERNAL MEDICINE

## 2019-07-18 PROCEDURE — 74011250636 HC RX REV CODE- 250/636: Performed by: INTERNAL MEDICINE

## 2019-07-18 PROCEDURE — 82962 GLUCOSE BLOOD TEST: CPT

## 2019-07-18 PROCEDURE — 74011250637 HC RX REV CODE- 250/637: Performed by: GENERAL ACUTE CARE HOSPITAL

## 2019-07-18 PROCEDURE — 74011000258 HC RX REV CODE- 258: Performed by: INTERNAL MEDICINE

## 2019-07-18 PROCEDURE — 65660000000 HC RM CCU STEPDOWN

## 2019-07-18 PROCEDURE — 74011250636 HC RX REV CODE- 250/636: Performed by: FAMILY MEDICINE

## 2019-07-18 RX ORDER — RANITIDINE 15 MG/ML
150 SYRUP ORAL 2 TIMES DAILY
Status: DISCONTINUED | OUTPATIENT
Start: 2019-07-18 | End: 2019-07-19 | Stop reason: HOSPADM

## 2019-07-18 RX ADMIN — PREDNISOLONE ACETATE 1 DROP: 10 SUSPENSION/ DROPS OPHTHALMIC at 15:28

## 2019-07-18 RX ADMIN — DEXTROSE MONOHYDRATE 50 ML/HR: 5 INJECTION, SOLUTION INTRAVENOUS at 18:29

## 2019-07-18 RX ADMIN — PREDNISOLONE ACETATE 1 DROP: 10 SUSPENSION/ DROPS OPHTHALMIC at 21:34

## 2019-07-18 RX ADMIN — CLONAZEPAM 1 MG: 1 TABLET ORAL at 18:17

## 2019-07-18 RX ADMIN — SODIUM CHLORIDE 40 MG: 9 INJECTION, SOLUTION INTRAMUSCULAR; INTRAVENOUS; SUBCUTANEOUS at 08:21

## 2019-07-18 RX ADMIN — CLOMIPRAMINE HYDROCHLORIDE 25 MG: 25 CAPSULE ORAL at 08:20

## 2019-07-18 RX ADMIN — RANITIDINE 150 MG: 15 SYRUP ORAL at 18:17

## 2019-07-18 RX ADMIN — CEFTRIAXONE 1 G: 1 INJECTION, POWDER, FOR SOLUTION INTRAMUSCULAR; INTRAVENOUS at 08:21

## 2019-07-18 RX ADMIN — Medication 10 ML: at 14:58

## 2019-07-18 RX ADMIN — FERROUS SULFATE TAB 325 MG (65 MG ELEMENTAL FE) 325 MG: 325 (65 FE) TAB at 08:21

## 2019-07-18 RX ADMIN — FERROUS SULFATE TAB 325 MG (65 MG ELEMENTAL FE) 325 MG: 325 (65 FE) TAB at 18:17

## 2019-07-18 RX ADMIN — FOLIC ACID 1 MG: 1 TABLET ORAL at 08:20

## 2019-07-18 RX ADMIN — CLONAZEPAM 1 MG: 1 TABLET ORAL at 08:20

## 2019-07-18 RX ADMIN — FLUOCINONIDE: 0.5 CREAM TOPICAL at 08:21

## 2019-07-18 RX ADMIN — FLUOCINONIDE: 0.5 CREAM TOPICAL at 20:17

## 2019-07-18 RX ADMIN — VENLAFAXINE HYDROCHLORIDE 150 MG: 37.5 CAPSULE, EXTENDED RELEASE ORAL at 08:20

## 2019-07-18 RX ADMIN — Medication 10 ML: at 05:32

## 2019-07-18 RX ADMIN — QUETIAPINE FUMARATE 50 MG: 25 TABLET ORAL at 18:17

## 2019-07-18 RX ADMIN — ATORVASTATIN CALCIUM 10 MG: 10 TABLET, FILM COATED ORAL at 21:33

## 2019-07-18 RX ADMIN — PRAVASTATIN SODIUM 40 MG: 40 TABLET ORAL at 21:33

## 2019-07-18 RX ADMIN — PREDNISOLONE ACETATE 1 DROP: 10 SUSPENSION/ DROPS OPHTHALMIC at 08:22

## 2019-07-18 RX ADMIN — Medication 10 ML: at 21:34

## 2019-07-18 RX ADMIN — MIRTAZAPINE 45 MG: 15 TABLET, FILM COATED ORAL at 21:33

## 2019-07-18 RX ADMIN — LEVOTHYROXINE SODIUM 37.5 MCG: 75 TABLET ORAL at 05:32

## 2019-07-18 RX ADMIN — QUETIAPINE FUMARATE 50 MG: 25 TABLET ORAL at 08:20

## 2019-07-18 RX ADMIN — DILTIAZEM HYDROCHLORIDE 180 MG: 180 CAPSULE, COATED, EXTENDED RELEASE ORAL at 08:20

## 2019-07-18 NOTE — PROGRESS NOTES
[address] with pt and pt's daughter at the bedside. Medicare pt has received, reviewed, and signed 2nd IM letter informing them of their right to appeal the discharge. Signed copy has been placed on pt bedside chart. Pt is a LTC resident at [facility] and they have accepted pt. Pt is eager to return to [facility]. CM placed transport with [facility] on will call for tomorrow.     Jeramy Hector, [address]

## 2019-07-18 NOTE — PROGRESS NOTES
Bedside and Verbal shift change report given to Emiliano Lerner RN (oncoming nurse) by Je Hickey RN (offgoing nurse). Report included the following information SBAR, Kardex, ED Summary, Intake/Output, MAR, Accordion, Recent Results, Med Rec Status and Cardiac Rhythm Sinus tach. Shift summary:  Received report and assumed care of patient. /75 (BP 1 Location: Right arm, BP Patient Position: At rest)   Pulse (!) 106   Temp 97.5 °F (36.4 °C)   Resp 20   Ht 5' 2\" (1.575 m)   Wt 82.8 kg (182 lb 8 oz) Comment: with 2 pillows  SpO2 100%   BMI 33.38 kg/m²     Patient resting comfortably. A/Ox3 with periodic confusion. Not oriented to time. Sinus tach on monitor. Room air with oxygen saturations . Lung sounds bilaterally clear but diminished in bases. Tube feedings running Jevity 1.5 at 45 ml/hr. Q4 hour water flushes 100 ml. Placement verified by auscultation. Tan tube residual so low it is unmeasurable <5ml. Patient took meds whole in applesauce with sips of water without incident.

## 2019-07-18 NOTE — PROGRESS NOTES
Hospitalist Progress Note    NAME: Elaine Mary   :  1940   MRN:  [id number]       Assessment / Plan:  Severe sepsis, POA, likely secondary to UTI - improving  Acute hypoxic respiratory failure  -UA suggestive of UTI, will f/u with Ucx  -CXR with new right lung base atelectasis versus pneumonia. Chronic interstitial lung  Disease. Erosions of the left glenohumeral joint are new and represent infectious or  inflammatory arthritis. Consider dedicated shoulder views.  -obtain CT a/p for further evaluation   -empiric abx vanc/rocephin  -nebs, O2 suppl prn    :  CT Abdo showed clear lung bases despite what was read on chest DX - therefore will discontinue Vancomycin and treat UTI with Ceftriaxone. Follow UCx  Continue O2 supplementation, wean as tolearted    :  UCx showing >100k bacteria, speciation pending. Will continue IV Ceftriaxone in the interim  Continue O2 supplementation, wean as tolerated     Hyponatremia - resolved  Bloody emesis concerning for GI bleed   -daughter states pt either cough up or vomiting dark blood  -start IV PPI and monitor for further emesis as her hgb is stable and FOBT neg  -monitor, hold GI consultation for now    :  No further recurrence and am doubtful of the initial history altogether. Hgb declined, as did the rest of the cell lines so likely dilutional. Will continue monitoring     Schizophrenia  -Prone to loud outbursts and and erratic speech involving Satan  -Resume home anti-psychotic meds    RA with joints deformities  -stable, bedbound at baseline     HTN  -hold home meds due to hypotension     Dysphagia s/p PEG  -nutrition consult for TF     RA (rheumatoid arthritis)   Humira Q week     Anxiety and Depression  Cont' home meds     B/l LE rash  -daughter states rash improving after pt taken off risperidone  -monitor for now    30.0 - 39.9 Obese / Body mass index is 33.38 kg/m².     Code status: Full  Prophylaxis: SCD's  Recommended Disposition: NH, bed bound     Subjective:     Chief Complaint / Reason for Physician Visit  Denies any complaints. Discussed with RN events overnight. Review of Systems:  Symptom Y/N Comments  Symptom Y/N Comments   Fever/Chills n   Chest Pain n    Poor Appetite    Edema     Cough    Abdominal Pain n    Sputum    Joint Pain     SOB/MADERA n   Pruritis/Rash     Nausea/vomit    Tolerating PT/OT     Diarrhea    Tolerating Diet     Constipation    Other       Could NOT obtain due to:      Objective:     VITALS:   Last 24hrs VS reviewed since prior progress note. Most recent are:  Patient Vitals for the past 24 hrs:   Temp Pulse Resp BP SpO2   07/18/19 0726 97.5 °F (36.4 °C) (!) 106 20 133/75 100 %   07/18/19 0452 98.9 °F (37.2 °C) (!) 110 16 109/51 98 %   07/17/19 2343 98 °F (36.7 °C) (!) 112 18 134/59 100 %   07/17/19 1930 98.2 °F (36.8 °C) (!) 104 20 129/62 98 %   07/17/19 1052 98.3 °F (36.8 °C) (!) 105 18 131/65 100 %       Intake/Output Summary (Last 24 hours) at 7/18/2019 1045  Last data filed at 7/18/2019 0428  Gross per 24 hour   Intake 1340 ml   Output 2000 ml   Net -660 ml        PHYSICAL EXAM:  General: Alert, cooperative, no acute distress    EENT:  EOMI. Anicteric sclerae. MMM  Resp:  CTA bilaterally, no wheezing or rales. No accessory muscle use  CV:  Regular  rhythm,  No edema  GI:  Soft, Non distended, Non tender.  +Bowel sounds  Neurologic:  Alert and oriented x3, appropriate speech, memory impairment   Psych:   Poor insight  Skin:  No rashes.   No jaundice    Reviewed most current lab test results and cultures  YES  Reviewed most current radiology test results   YES  Review and summation of old records today    NO  Reviewed patient's current orders and MAR    YES  PMH/SH reviewed - no change compared to H&P  ________________________________________________________________________  Care Plan discussed with:    Comments   Patient x    Family      RN x    Care Manager     Consultant                        Multidiciplinary team rounds were held today with , nursing, pharmacist and clinical coordinator. Patient's plan of care was discussed; medications were reviewed and discharge planning was addressed. ________________________________________________________________________  Total NON critical care TIME:  25 Minutes    Total CRITICAL CARE TIME Spent:   Minutes non procedure based      Comments   >50% of visit spent in counseling and coordination of care     ________________________________________________________________________  Yancy Crawford MD     Procedures: see electronic medical records for all procedures/Xrays and details which were not copied into this note but were reviewed prior to creation of Plan. LABS:  I reviewed today's most current labs and imaging studies.   Pertinent labs include:  Recent Labs     07/17/19  0205 07/16/19  0432   WBC 6.8 9.7   HGB 8.3* 9.4*   HCT 25.2* 27.3*    384     Recent Labs     07/17/19  0535 07/17/19  0205 07/16/19  2120 07/16/19  0432   * 135* 133* 123*   K 3.9 3.9 3.5 4.2    104 100 85*   CO2 25 26 25 31   * 104* 101* 124*   BUN 17 17 18 33*   CREA 0.35* 0.37* 0.28* 0.52*   CA 7.9* 7.8* 7.7* 8.6   ALB  --   --   --  2.4*   TBILI  --   --   --  0.2   SGOT  --   --   --  15   ALT  --   --   --  16       Signed: Yancy Crawford MD

## 2019-07-18 NOTE — PROGRESS NOTES
Problem: Falls - Risk of  Goal: *Absence of Falls  Description  Document Lowell Solorzano Fall Risk and appropriate interventions in the flowsheet. Outcome: Progressing Towards Goal  Note:   Fall Risk Interventions:       Mentation Interventions: Adequate sleep, hydration, pain control, Bed/chair exit alarm, Door open when patient unattended    Medication Interventions: Bed/chair exit alarm    Elimination Interventions: Bed/chair exit alarm, Call light in reach, Patient to call for help with toileting needs, Toileting schedule/hourly rounds    History of Falls Interventions: Door open when patient unattended, Room close to nurse's station         Problem: Patient Education: Go to Patient Education Activity  Goal: Patient/Family Education  Outcome: Progressing Towards Goal     Problem: Pressure Injury - Risk of  Goal: *Prevention of pressure injury  Description  Document Sage Scale and appropriate interventions in the flowsheet.   Outcome: Progressing Towards Goal  Note:   Pressure Injury Interventions:  Sensory Interventions: Assess changes in LOC, Avoid rigorous massage over bony prominences, Float heels, Keep linens dry and wrinkle-free    Moisture Interventions: Absorbent underpads, Check for incontinence Q2 hours and as needed, Internal/External urinary devices    Activity Interventions: PT/OT evaluation    Mobility Interventions: PT/OT evaluation    Nutrition Interventions: Document food/fluid/supplement intake    Friction and Shear Interventions: Apply protective barrier, creams and emollients, Lift sheet, Minimize layers                Problem: Patient Education: Go to Patient Education Activity  Goal: Patient/Family Education  Outcome: Progressing Towards Goal     Problem: Infection - Risk of, Septic Shock  Goal: *Absence of Septic Shock  Outcome: Progressing Towards Goal     Problem: Urinary Tract Infection - Adult  Goal: *Absence of infection signs and symptoms  Outcome: Progressing Towards Goal     Problem: Aspiration - Risk of  Goal: *Absence of aspiration  Outcome: Progressing Towards Goal

## 2019-07-18 NOTE — ROUTINE PROCESS
TRANSFER - OUT REPORT:    Verbal report given to Carmina Will RN (name) on June Rashawn Franco  being transferred to [facility] (unit) for routine progression of care       Report consisted of patients Situation, Background, Assessment and   Recommendations(SBAR). Information from the following report(s) SBAR, Kardex, ED Summary, Intake/Output, MAR, Accordion, Recent Results, Med Rec Status and Cardiac Rhythm Sinus tach to NSR was reviewed with the receiving nurse. Lines:   Peripheral IV 07/16/19 Left Antecubital (Active)   Site Assessment Clean, dry, & intact 7/18/2019  8:30 AM   Phlebitis Assessment 0 7/18/2019  8:30 AM   Infiltration Assessment 0 7/18/2019  8:30 AM   Dressing Status Clean, dry, & intact 7/18/2019  8:30 AM   Dressing Type Tape;Transparent 7/18/2019  8:30 AM   Hub Color/Line Status Green;Capped;Flushed 7/18/2019  8:30 AM   Alcohol Cap Used Yes 7/18/2019  3:30 AM       Peripheral IV 07/17/19 Posterior;Right Hand (Active)   Site Assessment Clean, dry, & intact 7/18/2019  8:30 AM   Phlebitis Assessment 0 7/18/2019  8:30 AM   Infiltration Assessment 0 7/18/2019  8:30 AM   Dressing Status Clean, dry, & intact 7/18/2019  8:30 AM   Dressing Type Tape;Transparent 7/18/2019  8:30 AM   Hub Color/Line Status Pink;Flushed; Infusing 7/18/2019  8:30 AM   Alcohol Cap Used Yes 7/18/2019 12:00 AM        Opportunity for questions and clarification was provided.       Patient transported with:   Registered Nurse    Patient belongings

## 2019-07-19 VITALS
HEART RATE: 98 BPM | BODY MASS INDEX: 33.58 KG/M2 | WEIGHT: 182.5 LBS | HEIGHT: 62 IN | TEMPERATURE: 98 F | SYSTOLIC BLOOD PRESSURE: 120 MMHG | RESPIRATION RATE: 18 BRPM | DIASTOLIC BLOOD PRESSURE: 63 MMHG | OXYGEN SATURATION: 98 %

## 2019-07-19 LAB
BACTERIA SPEC CULT: ABNORMAL
BACTERIA SPEC CULT: ABNORMAL
CC UR VC: ABNORMAL
GLUCOSE BLD STRIP.AUTO-MCNC: 124 MG/DL (ref 65–100)
SERVICE CMNT-IMP: ABNORMAL
SERVICE CMNT-IMP: ABNORMAL

## 2019-07-19 PROCEDURE — 74011250636 HC RX REV CODE- 250/636: Performed by: INTERNAL MEDICINE

## 2019-07-19 PROCEDURE — 74011000250 HC RX REV CODE- 250: Performed by: INTERNAL MEDICINE

## 2019-07-19 PROCEDURE — 74011000258 HC RX REV CODE- 258: Performed by: INTERNAL MEDICINE

## 2019-07-19 PROCEDURE — 74011250637 HC RX REV CODE- 250/637: Performed by: INTERNAL MEDICINE

## 2019-07-19 PROCEDURE — 74011250637 HC RX REV CODE- 250/637: Performed by: GENERAL ACUTE CARE HOSPITAL

## 2019-07-19 PROCEDURE — 82962 GLUCOSE BLOOD TEST: CPT

## 2019-07-19 RX ORDER — AMOXICILLIN AND CLAVULANATE POTASSIUM 875; 125 MG/1; MG/1
1 TABLET, FILM COATED ORAL EVERY 12 HOURS
Qty: 10 TAB | Refills: 0 | Status: SHIPPED | OUTPATIENT
Start: 2019-07-19 | End: 2019-07-24

## 2019-07-19 RX ADMIN — METOPROLOL TARTRATE 1.25 MG: 5 INJECTION INTRAVENOUS at 03:31

## 2019-07-19 RX ADMIN — LEVOTHYROXINE SODIUM 37.5 MCG: 75 TABLET ORAL at 06:00

## 2019-07-19 RX ADMIN — CLOMIPRAMINE HYDROCHLORIDE 25 MG: 25 CAPSULE ORAL at 09:49

## 2019-07-19 RX ADMIN — FOLIC ACID 1 MG: 1 TABLET ORAL at 09:49

## 2019-07-19 RX ADMIN — QUETIAPINE FUMARATE 50 MG: 25 TABLET ORAL at 09:49

## 2019-07-19 RX ADMIN — DILTIAZEM HYDROCHLORIDE 180 MG: 180 CAPSULE, COATED, EXTENDED RELEASE ORAL at 09:50

## 2019-07-19 RX ADMIN — RANITIDINE 150 MG: 15 SYRUP ORAL at 09:49

## 2019-07-19 RX ADMIN — CEFTRIAXONE 1 G: 1 INJECTION, POWDER, FOR SOLUTION INTRAMUSCULAR; INTRAVENOUS at 09:46

## 2019-07-19 RX ADMIN — FERROUS SULFATE TAB 325 MG (65 MG ELEMENTAL FE) 325 MG: 325 (65 FE) TAB at 09:49

## 2019-07-19 RX ADMIN — CLONAZEPAM 1 MG: 1 TABLET ORAL at 09:49

## 2019-07-19 RX ADMIN — Medication 10 ML: at 06:04

## 2019-07-19 NOTE — DISCHARGE INSTRUCTIONS
HOSPITALIST DISCHARGE INSTRUCTIONS    NAME: Dave De Paz   :  1940   MRN:  [id number]     Date/Time:  2019 8:33 AM    ADMIT DATE: 2019   DISCHARGE DATE: 2019     Attending Physician: Eun Richard MD    DISCHARGE DIAGNOSIS:  Severe sepsis, POA, likely secondary to UTI - improving, sepsis resolved  Acute hypoxic respiratory failure - resolved  Hyponatremia - resolved  Bloody emesis concerning for GI bleed   Advanced RA with joints deformities  HTN  Dysphagia s/p PEG  Anxiety and Depression    Medications: Per above medication reconciliation. Pain Management: per above medications    Recommended diet:   Resume TF diet Enteral Nutrition:  Begin Jevity 1.5 ele continuous infusion at 25 ml/hr x 24 hr.  Increase by 10 ml/hr q8h as tolerated towards goal rate of 45 ml/hr x 24 hr.    Add 1 pkt Prosource daily via PEG  Flush with 100 ml free water q4h. TF at goal rate + prosource + free water will provide daily approx. 1680 kcals, 83.9 g protein, and 1420 ml free water. Maintain HOB elevated at least 30-45 degrees during infusion of TFs. Recommended activity: Activity as tolerated    Wound care: None    Indwelling devices:  None    Supplemental Oxygen: None    Required Lab work: Per SNF routine    Glucose management:  Accucheck ACHS with sliding scale per SNF protocol and None    Code status: Full        Outside physician follow up: Follow-up Information     Follow up With Specialties Details Why Contact [address]  [phone]      Elvis Subramanian MD Internal Medicine   [address] [place] [address]  [address]  [address]  [phone]                 Skilled nursing facility/ SNF MD responsible for above on discharge. Information obtained by :  I understand that if any problems occur once I am at home I am to contact my physician.     I understand and acknowledge receipt of the instructions indicated above.                                                                                                                                            Physician's or R.N.'s Signature                                                                  Date/Time                                                                                                                                              Patient or Repres

## 2019-07-19 NOTE — PROGRESS NOTES
Hospital to [facility] SBAR Handoff - June Williams                                                                        78 y.o.   female    [address]   Room: 2219/01    [facility] 2 CARDIOPULMONARY CARE  Unit Phone# :  [phone]      [address]  [facility] CARDIOPULMONARY CARE  [address] [facility] [address]  Dept: [phone]  Loc: [phone]                    SITUATION     Admitted:  7/16/2019         Attending Provider:  Penny Maynard MD       Consultations:  IP CONSULT TO HOSPITALIST    PCP:  Bridger Persaud MD   [phone]    Treatment Team: Attending Provider: Penny Maynard MD; Consulting Provider: Modesto Maharaj MD; Care Manager: Pooja Draper; Care Manager: Salvatore Vanegas RN; Utilization Review: Keyon Lewis RN; Care Manager: Carie Hutchison    Admitting Dx: Severe sepsis ([facility] [A41.9, R65.20]       Principal Problem: <principal problem not specified>    * No surgery found * of      BY: * Surgery not found *             ON: * No surgery found *                  Code Status: Full Code                Advance Directives:   Advance Care Planning 7/16/2019   Patient's Healthcare Decision Maker is: -   Confirm Advance Directive Yes, not on file    (Send w/patient)   No Doesnt Have       Isolation:  There are currently no Active Isolations       MDRO: No current active infections    Pain Medications given:  N/A        Special Equipment needed: no  Type of equipment:      (Not currently on dialysis)  (Not currently on dialysis)  (Not currently on dialysis)     BACKGROUND     Allergies:   Allergies   Allergen Reactions    Codeine Nausea Only       Past Medical History:   Diagnosis Date    Baker's cyst of knee     Depression     Hypertension     RA (rheumatoid arthritis) ([facility]        Past Surgical History:   Procedure Laterality Date    COLONOSCOPY N/A 2/28/2017    COLONOSCOPY performed by Timothy Draper MD at [facility] ENDOSCOPY    HX HERNIA REPAIR      HX UROLOGICAL      \"bladder tack\"       Medications Prior to Admission   Medication Sig    ascorbic acid, vitamin C, (VITAMIN C) 250 mg tablet Take 250 mg by mouth daily.  bacitracin (BACITRACIN) 500 unit/gram oint Apply  to affected area nightly. Apply to PEG insertion    diphenhydrAMINE (BENADRYL ALLERGY) 25 mg tablet Take 25 mg by mouth two (2) times a day.  clomiPRAMINE (ANAFRANIL) 25 mg capsule Take 25 mg by mouth nightly.  latanoprost (XALATAN) 0.005 % ophthalmic solution Administer 1 Drop to right eye nightly.  polyethylene glycol (MIRALAX) 17 gram packet Take 17 g by mouth two (2) times a day.  naproxen sodium (NAPROSYN) 220 mg tablet Take 220 mg by mouth two (2) times daily (with meals).  simethicone (MYLICON) 80 mg chewable tablet Take 80 mg by mouth two (2) times a day.  sodium chloride 1 gram tablet Take 1 g by mouth two (2) times a day.  methotrexate (TREXALL) 7.5 mg tablet Take 7.5 mg by mouth every seven (7) days.  cyanocobalamin (VITAMIN B-12) 100 mcg tablet Take 100 mcg by mouth daily.  ondansetron (ZOFRAN ODT) 8 mg disintegrating tablet Take 8 mg by mouth every eight (8) hours as needed for Nausea.  HUMIRA PEN 40 mg/0.8 mL injection pen INJECT 40MG(1 PEN) UNDER SKIN EVERY WEEK.  atorvastatin (LIPITOR) 10 mg tablet Take 10 mg by mouth daily.  clomiPRAMINE (ANAFRANIL) 75 mg capsule Take 150 mg by mouth nightly.  dilTIAZem CD (CARDIZEM CD) 180 mg ER capsule Take 180 mg by mouth daily.  docusate sodium (COLACE) 100 mg capsule Take 100 mg by mouth two (2) times a day.  clonazePAM (KLONOPIN) 1 mg tablet Take 1 mg by mouth two (2) times a day.  levothyroxine (SYNTHROID) 75 mcg tablet Take 37.5 mcg by mouth Daily (before breakfast).  HYDROcodone-acetaminophen (NORCO) 5-325 mg per tablet Take 1 Tab by mouth four (4) times daily.  multivitamin (ONE A DAY) tablet Take 1 Tab by mouth daily.     ferrous sulfate (IRON, FERROUS SULFATE,) 325 mg (65 mg iron) tablet Take 1 Tab by mouth two (2) times a day.  pantoprazole (PROTONIX) 40 mg tablet Take 1 Tab by mouth Daily (before breakfast).  venlafaxine-SR (EFFEXOR-XR) 37.5 mg capsule Take 37.5 mg by mouth daily.  folic acid (FOLVITE) 1 mg tablet Take 1 mg by mouth daily.  acetaminophen (TYLENOL) 325 mg tablet Take 650 mg by mouth every four (4) hours as needed for Pain.  mirtazapine (REMERON) 30 mg tablet Take 30 mg by mouth nightly.  prednisoLONE acetate (PRED FORTE) 1 % ophthalmic suspension Administer 1 Drop to right eye two (2) times a day.  naloxone (NARCAN) 0.4 mg/mL injection 0.4 mg by IntraVENous route as needed. Hard scripts included in transfer packet no    Vaccinations: There is no immunization history on file for this patient. Readmission Risks:    Known Risks: Fall risk, pressure injury risk, aspiration risk        The Charlson CoMorbitiy Index tool is an evidenced based tool that has more automatic generated information. The tool looks at many different items such as the age of the patient, how many times they were admitted in the last calendar year, current length of stay in the hospital and their diagnosis. All of these items are pulled automatically from information documented in the chart from various places and will generate a score that predicts whether a patient is at low (less than 13), medium (13-20) or high (21 or greater) risk of being readmitted.         ASSESSMENT                Temp: 97.9 °F (36.6 °C) (07/19/19 0800) Pulse (Heart Rate): 99 (07/19/19 0800)     Resp Rate: 16 (07/19/19 0800)           BP: 133/60 (07/19/19 0800)     O2 Sat (%): 97 % (07/19/19 0800)     Weight: 82.8 kg (182 lb 8 oz)(with 2 pillows)    Height: 5' 2\" (157.5 cm) (07/16/19 1149)       If above not within 1 hour of discharge:    BP:_____  P:____  R:____ T:_____ O2 Sat: ___%  O2: ______    Active Orders   Diet    DIET NPO With Tube Feedings         Orientation: only aware of place, person and situation     Active Behaviors: None                                   Active Lines/Drains:  (Peg Tube / Chand / CL or S/L?): Peg Tube    Urinary Status: Voiding, External catheter     Last BM: Last Bowel Movement Date: 07/16/19     Skin Integrity: Other (comment)(Peg tube)             Mobility: Very limited   Weight Bearing Status: NWB (Non Weight Bearing)                Lab Results   Component Value Date/Time    Glucose 120 (H) 07/17/2019 05:35 AM    INR 1.1 02/23/2017 10:40 PM    HGB 8.3 (L) 07/17/2019 02:05 AM    HGB 9.4 (L) 07/16/2019 04:32 AM        RECOMMENDATION     See After Visit Summary (AVS) for:  · Discharge instructions  · After [address]   · Special equipment needed (entered pre-discharge by Care Management)  · Medication Reconciliation    · Follow up Appointment(s)         Report given/sent by:  Megan Mendenhall                    Verbal report given to: Franny         Estimated discharge time:  7/19/2019

## 2019-07-19 NOTE — PROGRESS NOTES
DAKOTA plan: Pt will discharge back to [address] [place] [address] facility today, transported by [facility] stretcher at 10:00 am. CM discussed plan of care with pt's daughter, Sandra Bruner, over the phone and confirmed d/c with Aedla Da Silva liaison, Ping Phoenix. All verbalized understanding of the plan. No further concerns indicated at this time. Transport arranged via [facility] stretcher for confusion and generalized weakness/debility. Referral was sent via [employer]. PCS, Facesheet, and H&P placed on chart for transport. Nursing will need to call report to Adela Da Silva at [phone]. Please send AVS, MAR, and any written prescriptions to facility in SNF envelope. Care Management Interventions  PCP Verified by CM:  Yes  Mode of Transport at Discharge: [facility] Time of Discharge: 1000  Transition of Care Consult (CM Consult): Long Term Care([facility])  MyChart Signup: No  Discharge Durable Medical Equipment: No  Physical Therapy Consult: No  Occupational Therapy Consult: No  Speech Therapy Consult: Yes  Current Support Network: [address] [place], [place] [address]e, Family Lives Nearby  Confirm Follow Up Transport: Family  Plan discussed with Pt/Family/Caregiver: Yes(discussed with pt and daughter)  Freedom of Choice Offered: Yes  Discharge Location  Discharge Placement: JOVANNY Hahn  Care Manager  [phone]

## 2019-07-19 NOTE — DISCHARGE SUMMARY
Hospitalist Discharge Summary     Patient ID:  Elaine Ames  [id number]  70 y.o.  1940 7/16/2019    PCP on record: Dontae Navarro MD    Admit date: 7/16/2019  Discharge date and time: 7/19/2019    DISCHARGE DIAGNOSIS:  See below    CONSULTATIONS:  IP CONSULT TO HOSPITALIST    Excerpted HPI from H&P of Kim Dasilva MD:  Sangeeta Bradley is a 70 y.o.  female who presents with RA, HTN, dysphagia s/p PEG placement, present to the ER from NH for evaluation of ?vomiting dark blood. Other complaint includes rash on b/l LE for ~3-4 days per daughter. In the ER pt is AAOx2, intermittent confusion at times which is baseline per daughter. Pt denies any acute complaints. In the ER, vitals: T98.7, P 130, BP 58/22, SpO2 88% on RA  Pertinent labs: Na 123, WBC 9.7, UA suggestive of UTI  On physical exam, pt has b/l LE rash up in knee. PEG site is erythematous and draining purulent material.  She has a delgado catheter in place.    ______________________________________________________________________  DISCHARGE SUMMARY/HOSPITAL COURSE:  for full details see H&P, daily progress notes, labs, consult notes. Severe sepsis, POA, likely secondary to UTI - improving, sepsis resolved  Acute hypoxic respiratory failure - resolved  -UA suggestive of UTI, will f/u with Ucx  -CXR with new right lung base atelectasis versus pneumonia. Chronic interstitial lung  Disease. Erosions of the left glenohumeral joint are new and represent infectious or  inflammatory arthritis. Consider dedicated shoulder views.  -obtain CT a/p for further evaluation   -empiric abx vanc/rocephin  -nebs, O2 suppl prn    7/17:  CT Abdo showed clear lung bases despite what was read on chest DX - therefore will discontinue Vancomycin and treat UTI with Ceftriaxone. Follow UCx  Continue O2 supplementation, wean as tolearted    7/18:  UCx showing >100k bacteria, speciation pending.   Will continue IV Ceftriaxone in the interim  Continue O2 supplementation, wean as tolerated    7/19:  UCx showing A. Viridans and probable Enterococcus  WBC wnl and pt afebrile. Pt stable for discharge back to [facility]  Will discharge on Augmentin 5 days.     Hyponatremia - resolved  Coffee ground emesis, initially concerning for GI bleed but unlikely as noted below  -daughter states pt either cough up or vomiting dark blood  -start IV PPI and monitor for further emesis as her hgb is stable and FOBT neg  -monitor, hold GI consultation for now    7/19  No further recurrence and am doubtful of the initial history altogether. Hgb declined, as did the rest of the cell lines so likely dilutional. FOBT was negative on admission.      Schizophrenia  -Resume home meds     Advanced RA with joints deformities  -stable, bedbound at baseline, continue weekly Humira     HTN  -Resume home CCB     Dysphagia s/p PEG  -Resume TF diet Enteral Nutrition:  Begin Jevity 1.5 ele continuous infusion at 25 ml/hr x 24 hr.  Increase by 10 ml/hr q8h as tolerated towards goal rate of 45 ml/hr x 24 hr.    Add 1 pkt Prosource daily via PEG  Flush with 100 ml free water q4h. TF at goal rate + prosource + free water will provide daily approx. 1680 kcals, 83.9 g protein, and 1420 ml free water. Maintain HOB elevated at least 30-45 degrees during infusion of TFs.     Anxiety and Depression  Cont' home meds     B/l LE rash - resolved  -daughter states rash improving after pt taken off risperidone   _______________________________________________________________________  Patient seen and examined by me on discharge day. Pertinent Findings:  Gen:    Not in distress  Chest: Clear lungs  CVS:   Regular rhythm.   No edema  Abd:  Soft, not distended, not tender  Neuro:  Alert, oriented x3, memory impairment, poor insight  _______________________________________________________________________  DISCHARGE MEDICATIONS:   Current Discharge Medication List      START taking these medications    Details amoxicillin-clavulanate (AUGMENTIN) 875-125 mg per tablet Take 1 Tab by mouth every twelve (12) hours for 5 days. Qty: 10 Tab, Refills: 0         CONTINUE these medications which have NOT CHANGED    Details   ascorbic acid, vitamin C, (VITAMIN C) 250 mg tablet Take 250 mg by mouth daily. bacitracin (BACITRACIN) 500 unit/gram oint Apply  to affected area nightly. Apply to PEG insertion      clomiPRAMINE (ANAFRANIL) 25 mg capsule Take 25 mg by mouth nightly. latanoprost (XALATAN) 0.005 % ophthalmic solution Administer 1 Drop to right eye nightly. polyethylene glycol (MIRALAX) 17 gram packet Take 17 g by mouth two (2) times a day. naproxen sodium (NAPROSYN) 220 mg tablet Take 220 mg by mouth two (2) times daily (with meals). simethicone (MYLICON) 80 mg chewable tablet Take 80 mg by mouth two (2) times a day. sodium chloride 1 gram tablet Take 1 g by mouth two (2) times a day. methotrexate (TREXALL) 7.5 mg tablet Take 7.5 mg by mouth every seven (7) days. cyanocobalamin (VITAMIN B-12) 100 mcg tablet Take 100 mcg by mouth daily. ondansetron (ZOFRAN ODT) 8 mg disintegrating tablet Take 8 mg by mouth every eight (8) hours as needed for Nausea. HUMIRA PEN 40 mg/0.8 mL injection pen INJECT 40MG(1 PEN) UNDER SKIN EVERY WEEK. Qty: 4 Kit, Refills: 4      atorvastatin (LIPITOR) 10 mg tablet Take 10 mg by mouth daily. dilTIAZem CD (CARDIZEM CD) 180 mg ER capsule Take 180 mg by mouth daily. docusate sodium (COLACE) 100 mg capsule Take 100 mg by mouth two (2) times a day. clonazePAM (KLONOPIN) 1 mg tablet Take 1 mg by mouth two (2) times a day. levothyroxine (SYNTHROID) 75 mcg tablet Take 37.5 mcg by mouth Daily (before breakfast). HYDROcodone-acetaminophen (NORCO) 5-325 mg per tablet Take 1 Tab by mouth four (4) times daily. multivitamin (ONE A DAY) tablet Take 1 Tab by mouth daily.       ferrous sulfate (IRON, FERROUS SULFATE,) 325 mg (65 mg iron) tablet Take 1 Tab by mouth two (2) times a day. Qty: 30 Tab, Refills: 0      pantoprazole (PROTONIX) 40 mg tablet Take 1 Tab by mouth Daily (before breakfast). Qty: 30 Tab, Refills: 0      venlafaxine-SR (EFFEXOR-XR) 37.5 mg capsule Take 37.5 mg by mouth daily. folic acid (FOLVITE) 1 mg tablet Take 1 mg by mouth daily. acetaminophen (TYLENOL) 325 mg tablet Take 650 mg by mouth every four (4) hours as needed for Pain.      mirtazapine (REMERON) 30 mg tablet Take 30 mg by mouth nightly. prednisoLONE acetate (PRED FORTE) 1 % ophthalmic suspension Administer 1 Drop to right eye two (2) times a day.      naloxone (NARCAN) 0.4 mg/mL injection 0.4 mg by IntraVENous route as needed. STOP taking these medications       diphenhydrAMINE (BENADRYL ALLERGY) 25 mg tablet Comments:   Reason for Stopping:         albuterol-ipratropium (DUO-NEB) 2.5 mg-0.5 mg/3 ml nebu Comments:   Reason for Stopping:         QUEtiapine (SEROQUEL) 100 mg tablet Comments:   Reason for Stopping:         polyvinyl alcohol (LIQUIFILM TEARS) 1.4 % ophthalmic solution Comments:   Reason for Stopping:         lovastatin (MEVACOR) 40 mg tablet Comments:   Reason for Stopping:               Patient Follow Up Instructions:    Activity: Activity as tolerated  Diet: Resume previous TF diet  Wound Care: None needed    Follow-up Information     Follow up With Specialties Details Why Contact Info    [address]  [phone]      Judge Magen MD Internal Medicine   [address] [place] [address]  [facility] 57  [phone]          ________________________________________________________________    Risk of deterioration: Low    Condition at Discharge:  Stable  __________________________________________________________________    Disposition  SNF/LTC    ____________________________________________________________________    Code Status: Full Code  ___________________________________________________________________      Total time in minutes spent coordinating this discharge (includes going over instructions, follow-up, prescriptions, and preparing report for sign off to her PCP) :  35 minutes    Signed:  Leah Fletcher MD

## 2019-07-19 NOTE — PROGRESS NOTES
Problem: Falls - Risk of  Goal: *Absence of Falls  Description  Document Jhony Velásquez Fall Risk and appropriate interventions in the flowsheet.   Outcome: Progressing Towards Goal  Note:   Fall Risk Interventions:       Mentation Interventions: Bed/chair exit alarm, Door open when patient unattended, More frequent rounding, Reorient patient, Room close to nurse's station, Toileting rounds    Medication Interventions: Bed/chair exit alarm, Evaluate medications/consider consulting pharmacy, Patient to call before getting OOB, Teach patient to arise slowly    Elimination Interventions: Bed/chair exit alarm, Call light in reach, Patient to call for help with toileting needs, Stay With Me (per policy), Toileting schedule/hourly rounds    History of Falls Interventions: Bed/chair exit alarm

## 2019-07-19 NOTE — PROGRESS NOTES
Bedside shift change report GIVEN TO Desirae Titus RN. Report included the following information SBAR and Kardex. SIGNIFICANT CHANGES DURING SHIFT:    0300- HR sustaining 130s, gave PRN metoprolol, will continue to monitor       CONCERNS TO ADDRESS WITH MD:            [address] NURSING NOTE   Admission Date 7/16/2019   Admission Diagnosis Severe sepsis ([facility] [A41.9, R65.20]   Consults IP CONSULT TO HOSPITALIST      Cardiac Monitoring [x] Yes [] No      Purposeful Hourly Rounding [x] Yes    Reinaldo Score Total Score: 3   Reinaldo score 3 or > [x] Bed Alarm [] Avasys [] 1:1 sitter [] Patient refused (Signed refusal form in chart)   Sage Score Sage Score: 14   Sage score 14 or < [] PMT consult [] Wound Care consult    []  Specialty bed  [] Nutrition consult      Influenza Vaccine Received Flu Vaccine for Current Season (usually Sept-March): Not Flu Season           Oxygen needs? [x] Room air Oxygen @  []1L    []2L    []3L   []4L    []5L   []6L via  NC   Chronic home O2 use?  [] Yes [] No  Perform O2 challenge test and document in progress note using smartphrase (.Homeoxygen)      Last bowel movement Last Bowel Movement Date: 07/16/19      Urinary Catheter [REMOVED] Urinary Catheter 07/16/19 2- way-Indications for Use: Accurate measurement of urinary output     [REMOVED] Urinary Catheter 07/16/19 2- way-Urine Output (mL): 800 ml  External Female Catheter 07/17/19-Urine Output (mL): 300 ml     LDAs               Peripheral IV 07/16/19 Left Antecubital (Active)   Site Assessment Clean, dry, & intact 7/18/2019  8:49 PM   Phlebitis Assessment 0 7/18/2019  8:49 PM   Infiltration Assessment 0 7/18/2019  8:49 PM   Dressing Status Clean, dry, & intact 7/18/2019  8:49 PM   Dressing Type Transparent 7/18/2019  8:49 PM   Hub Color/Line Status Flushed 7/18/2019  8:49 PM   Alcohol Cap Used Yes 7/18/2019  3:30 AM       Peripheral IV 07/17/19 Posterior;Right Hand (Active)   Site Assessment Clean, dry, & intact 7/18/2019  8:49 PM Phlebitis Assessment 0 7/18/2019  8:49 PM   Infiltration Assessment 0 7/18/2019  8:49 PM   Dressing Status Clean, dry, & intact 7/18/2019  8:49 PM   Dressing Type Transparent 7/18/2019  8:49 PM   Hub Color/Line Status Flushed 7/18/2019  8:49 PM   Alcohol Cap Used Yes 7/18/2019 12:00 AM          PEG/Gastrostomy Tube (Active)   Site Assessment Dry; Intact 7/18/2019  8:49 PM   Dressing Status Clean, dry, & intact 7/18/2019  8:49 PM   G Port Status Infusing 7/18/2019  8:49 PM   Action Taken Placement verified (comment) 7/18/2019  5:00 PM   Drainage Description Tan 7/18/2019  5:00 PM   Gastric Residual (mL) 0 ml 7/18/2019  5:00 PM   Tube Feeding/Formula Options Jevity 1.5 7/18/2019  8:49 PM   Modular Nutrients Protein liquid 7/18/2019  3:15 PM   Tube Feeding/Verify Rate (mL/hr) 45 7/18/2019  5:00 PM   Water Flush Volume (mL) 100 mL 7/18/2019  5:00 PM   Intake (ml) 90 ml 7/18/2019  5:00 PM       External Female Catheter 07/17/19 (Active)   Site Assessment Clean, dry, & intact 7/18/2019  8:49 PM   Repositioned Yes 7/18/2019  8:49 PM   Perineal Care Yes 7/18/2019  8:49 PM   Wick Changed Yes 7/18/2019  8:49 PM   Suction Canister/Tubing Changed No 7/18/2019  8:49 PM   Urine Output (mL) 300 ml 7/18/2019  3:15 PM                   Readmission Risk Assessment Tool Score Medium Risk            15       Total Score        2 . Living with Significant Other. Assisted Living. LTAC. SNF. or   Rehab    9 Pt.  Coverage (Medicare=5 , Medicaid, or Self-Pay=4)    4 Charlson Comorbidity Score (Age + Comorbid Conditions)        Criteria that do not apply:    Has Seen PCP in Last 6 Months (Yes=3, No=0)    Patient Length of Stay (>5 days = 3)    IP Visits Last 12 Months (1-3=4, 4=9, >4=11)       Expected Length of Stay 4d 19h   Actual Length of Stay 3

## 2019-07-20 LAB
BACTERIA SPEC CULT: ABNORMAL
GRAM STN SPEC: ABNORMAL
GRAM STN SPEC: ABNORMAL
SERVICE CMNT-IMP: ABNORMAL

## 2019-07-23 ENCOUNTER — PATIENT OUTREACH (OUTPATIENT)
Dept: CASE MANAGEMENT | Age: 79
End: 2019-07-23

## 2019-07-23 NOTE — PROGRESS NOTES
Community Care Team documentation for patient in [facility]  Initial Follow Up       Patient was discharged to [facility], [facility]. Information included in this progress note has been provided to SNF. Hospital Admission and Diagnosis: MRM 7/16-7/19 Severe sepsis, POA, likely secondary to     UTI RRAT Score: 15        Advance Care Planning: Not on file     PCP : Preeti Mendenhall MD    SNF Attending:  Fernando Forde MD    Spoke with SNF team. Ensured patient arrived to SNF safely with admission packet in order. Patient is in LTC at [facility] and did not receive skilled therapy after hospital stay. CCT will sign off at this time. Community Care Team will follow up weekly with [facility] until discharge. Medications were not reconciled and general patient assessment was not completed during this [facility] outreach.       Kathrin Hagen, MSN, RN, ACNS-BC, [facility]  Nurse Navigator, [email or web address] [phone]

## 2019-11-18 NOTE — INTERDISCIPLINARY ROUNDS
Interdisciplinary team rounds were held 7/9/2018 with the following team members:Care Management, Nursing, Nurse Practitioner and Pharmacy and the patient. Plan of care discussed. See clinical pathway and/or care plan for interventions and desired outcomes.     12:59 PM pt to be transferred to [facility] bed MRI ordered. Last one I can see was done 2014 at [facility].     Maci Mendoza MD

## 2019-11-20 ENCOUNTER — TELEPHONE (OUTPATIENT)
Dept: RHEUMATOLOGY | Age: 79
End: 2019-11-20

## 2019-11-20 NOTE — TELEPHONE ENCOUNTER
Spoke to Dameon Noyola at [facility], informed Carola Stafford per Dr Alea Wilson that the pt needs a follow up appointment before the Humira medication can be refilled.  Carola Stafford verbally acknowledged understanding

## 2019-12-16 ENCOUNTER — OFFICE VISIT (OUTPATIENT)
Dept: RHEUMATOLOGY | Age: 79
End: 2019-12-16

## 2019-12-16 ENCOUNTER — HOSPITAL ENCOUNTER (OUTPATIENT)
Dept: LAB | Age: 79
Discharge: HOME | End: 2019-12-16
Payer: MEDICARE

## 2019-12-16 VITALS
TEMPERATURE: 98 F | OXYGEN SATURATION: 95 % | HEIGHT: 62 IN | BODY MASS INDEX: 33.49 KG/M2 | SYSTOLIC BLOOD PRESSURE: 129 MMHG | WEIGHT: 182 LBS | RESPIRATION RATE: 18 BRPM | DIASTOLIC BLOOD PRESSURE: 78 MMHG | HEART RATE: 119 BPM

## 2019-12-16 DIAGNOSIS — M17.0 PRIMARY OSTEOARTHRITIS OF BOTH KNEES: ICD-10-CM

## 2019-12-16 DIAGNOSIS — M05.79 SEROPOSITIVE RHEUMATOID ARTHRITIS OF MULTIPLE SITES (HCC): Primary | ICD-10-CM

## 2019-12-16 PROCEDURE — 83550 IRON BINDING TEST: CPT

## 2019-12-16 PROCEDURE — 85007 BL SMEAR W/DIFF WBC COUNT: CPT

## 2019-12-16 PROCEDURE — 85651 RBC SED RATE NONAUTOMATED: CPT

## 2019-12-16 PROCEDURE — 86803 HEPATITIS C AB TEST: CPT

## 2019-12-16 PROCEDURE — 80053 COMPREHEN METABOLIC PANEL: CPT

## 2019-12-16 PROCEDURE — 84443 ASSAY THYROID STIM HORMONE: CPT

## 2019-12-16 PROCEDURE — 86140 C-REACTIVE PROTEIN: CPT

## 2019-12-16 PROCEDURE — 36415 COLL VENOUS BLD VENIPUNCTURE: CPT

## 2019-12-16 PROCEDURE — 82607 VITAMIN B-12: CPT

## 2019-12-16 NOTE — PROGRESS NOTES
Una Hull is a 70 y.o. female  HIPAA verified by two patient identifiers. Chief Complaint   Patient presents with    Follow-up     Visit Vitals  /78 (BP 1 Location: Right arm, BP Patient Position: Sitting)   Pulse (!) 119   Temp 98 °F (36.7 °C) (Oral)   Resp 18   Ht 5' 2\" (1.575 m)   Wt 182 lb (82.6 kg) Comment: pt in w/c   SpO2 95%   BMI 33.29 kg/m²       Pain Scale: 10 - Worst pain ever/10  Pain Location: Generalized    Health Maintenance Due   Topic    DTaP/Tdap/Td series (1 - Tdap)    Shingrix Vaccine Age 50> (1 of 2)    GLAUCOMA SCREENING Q2Y     Bone Densitometry (Dexa) Screening     Pneumococcal 65+ years (1 of 1 - PPSV23)    MEDICARE YEARLY EXAM     Influenza Age 5 to Adult      1. Have you been to the ER, urgent care clinic since your last visit? Hospitalized since your last visit? No    2. Have you seen or consulted any other health care providers outside of the [address] [place] [address] since your last visit? Include any pap smears or colon screening.  No

## 2019-12-16 NOTE — PROGRESS NOTES
RHEUMATOLOGY PROBLEM LIST AND CHIEF COMPLAINT  1. Rheumatoid arthritis (1995) - polyarthritis, significant deformity of multiple joints with subluxation, positive JOAN (1:160), positive CCP, positive rheumatoid factor    Therapy History:  Prior DMARDs: Remicade (allergy), hydroxychloroquine (not effective), methotrexate (noncompliant; AEs-hair loss)  Current DMARDs: Humira (stopped in 2010, 2015-04/2016, 1/2017-current)    2. Osteoarthritis     INTERVAL HISTORY  This is a 70 y.o.  female. Today, the patient complains of pain in the joints. Location: generalized  Severity:  10 on a scale of 0-10  Timing: all day   Duration:  10 months  Modifying factors: Humira  Context/Associated signs and symptoms: Via her daughter the patient complains of severe pain in the hands and knee. She requests to be switched to Enbrel, she states she has not noticed improvement with Humira use and her brother has done well with Enbrel. We discussed the goals of treatment. She continues with Humira 40 mg weekly. The patient has been noncompliant with Methotrexate use; she requests to have this medication stopped due to hair thinning. She states she received significant improvement with the steroid injection in the knee. We reviewed her recent labs, which were overall normal except for anemia. Her psychiatric medications have been adjusted, her anxiety has overall improved.      RHEUMATOLOGY REVIEW OF SYSTEMS   Positives as per history  Negatives as follows:  Paulseverino Pearcevesta:  Denies unexplained persistent fevers, weight change, chronic fatigue  HEAD/EYES:   Denies eye redness, blurry vision or sudden loss of vision, dry eyes, HA, temporal artery pain  ENT:    Denies oral/nasal ulcers, recurrent sinus infections, dry mouth  RESPIRATORY:  No pleuritic pain, history of pleural effusions, hemoptysis, exertional dyspnea  CARDIOVASCULAR:  Denies chest pain, history of pericardial effusions  GASTRO:   Denies heartburn, esophageal dysmotility, abdominal pain, nausea, vomiting, diarrhea, blood in the stool  HEMATOLOGIC:  No easy bruising, purpura, swollen lymph nodes  SKIN:    Denies alopecia, ulcers, nodules, sun sensitivity, unexplained persistent rash   VASCULAR:   Denies edema, cyanosis, raynaud phenomenon  NEUROLOGIC:  Denies specific muscle weakness, paresthesias   PSYCHIATRIC:  No sleep disturbance / snoring, depression, anxiety  MSK:    No morning stiffness >1 hour, SI joint pain, persistent joint swelling    PAST MEDICAL HISTORY  Reviewed with patient, significant changes in medical history - no    FAMILY HISTORY  rheumatoid arthritis     PHYSICAL EXAM  Blood pressure 129/78, pulse (!) 119, temperature 98 °F (36.7 °C), temperature source Oral, resp. rate 18, height 5' 2\" (1.575 m), weight 182 lb (82.6 kg), SpO2 95 %. GENERAL APPEARANCE: Well-nourished. Wheelchair, acute distress secondary to anxiety  NECK: No adenopathy, decreased ROM  ENT: No oral ulcers  CARDIOVASCULAR: Heart rhythm is regular. No murmur, rub, gallop  CHEST: Normal vesicular breath sounds. No wheezes, rales, pleural friction rubs  ABDOMINAL: The abdomen is soft and nontender. Bowel sounds are normal  SKIN: No rash, palpable purpura, digital ulcer, abnormal thickening   NEUROLOGICAL:  MUSCULOSKELETAL:   Upper extremities - ulnar deviation with boutonniere deformity and swan-neck deformity of bilateral hands. Subluxation of joints. Heberden's and Vivi's nodes noted. Wrist decreased ROM B/L. R wrist warmth, with tenderness. Lower extremities - B/L ankles mild swelling. L knee bony prominence completely contracted, no ROM, warmth, effusion, no synovitis. LABS, RADIOLOGY AND PROCEDURES   Previous labs reviewed    10/2018  ESR >100    ASSESSMENT  1. Rheumatoid arthritis (Established problem -  Progressive disease) - The pain she is experiencing is likely due to OA from previous damage. On exam her inflammatory disease is well controlled.  I do not suspect switching to Enbrel would improve her pain. Prednisone will likely not be tolerated due to anxiety and may prevent the bed sore from healing. For now she should continue on Humira 40 mg weekly. The patient can stop Methotrexate due to her complaints of hair thinning. If her symptoms worsen we will plan to add Leflunomide instead. I will order labs today including an anemia work up and TB test. She should return in 3 months for a follow up. 2. 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 We did not discuss this today. 3. Osteoarthritis - She has almost no range of motion in her left knee. The patient responded well to a steroid injection in the left knee at last visit. OTC meds and tramadol are recommended. 4. Bone density - bone scan ordered in 12/2015 but has not been performed, she currently uses vitamin D supplementation. 5. Mild senile dementia - paranoia, psychosis, anxiety (New problem - Progressive disease) - This has overall improved but continues to be a problem per her daughter. PLAN  1. Humira 40 mg weekly  2. Stop Methotrexate  3. Check CBC, CMP, markers of inflammation (ESR, CRP), iron panel, reticulocyte count, folic acid and U49, TSH, Quantiferon gold TB test  4. Return in 3 months    Ada Mojica MD  Adult and Pediatric Rheumatology     [facility] [address], [address] [facility], Phone [phone], Fax [phone]   E-mail: [email or web address]    Visiting  of Pediatrics    Department of Pediatrics, [facility] of [address] [place] [address], [address] [place] [address], Phone [phone], Fax [phone]  E-mail: [email or web address]    There are no Patient Instructions on file for this visit. cc:  Slade Robert MD    Written by blanco Zamorano, as dictated by Kaylee Manzanares.  Estela Mojica M.D.

## 2019-12-17 LAB
ALBUMIN SERPL-MCNC: 3.2 G/DL (ref 3.5–4.8)
ALBUMIN/GLOB SERPL: 0.8 {RATIO} (ref 1.2–2.2)
ALP SERPL-CCNC: 117 IU/L (ref 39–117)
ALT SERPL-CCNC: 10 IU/L (ref 0–32)
AST SERPL-CCNC: 14 IU/L (ref 0–40)
BASOPHILS # BLD MANUAL: 0.1 X10E3/UL (ref 0–0.2)
BASOPHILS NFR BLD MANUAL: 1 %
BILIRUB SERPL-MCNC: <0.2 MG/DL (ref 0–1.2)
BUN SERPL-MCNC: 15 MG/DL (ref 8–27)
BUN/CREAT SERPL: 25 (ref 12–28)
CALCIUM SERPL-MCNC: 9 MG/DL (ref 8.7–10.3)
CHLORIDE SERPL-SCNC: 100 MMOL/L (ref 96–106)
CO2 SERPL-SCNC: 22 MMOL/L (ref 20–29)
COMMENT, 144067: NORMAL
CREAT SERPL-MCNC: 0.59 MG/DL (ref 0.57–1)
CRP SERPL-MCNC: 67 MG/L (ref 0–10)
DIFFERENTIAL COMMENT, 115260: ABNORMAL
EOSINOPHIL # BLD MANUAL: 0.3 X10E3/UL (ref 0–0.4)
EOSINOPHIL NFR BLD MANUAL: 3 %
ERYTHROCYTE [DISTWIDTH] IN BLOOD BY AUTOMATED COUNT: 14 % (ref 12.3–15.4)
ERYTHROCYTE [SEDIMENTATION RATE] IN BLOOD BY WESTERGREN METHOD: 93 MM/HR (ref 0–40)
FOLATE SERPL-MCNC: >20 NG/ML
GLOBULIN SER CALC-MCNC: 4 G/DL (ref 1.5–4.5)
GLUCOSE SERPL-MCNC: 103 MG/DL (ref 65–99)
HBV CORE AB SERPL QL IA: NEGATIVE
HBV CORE IGM SERPL QL IA: NEGATIVE
HBV E AB SERPL QL IA: NEGATIVE
HBV E AG SERPL QL IA: NEGATIVE
HBV SURFACE AB SER QL: NON REACTIVE
HBV SURFACE AG SERPL QL IA: NEGATIVE
HCT VFR BLD AUTO: 27.7 % (ref 34–46.6)
HCV AB S/CO SERPL IA: <0.1 S/CO RATIO (ref 0–0.9)
HGB BLD-MCNC: 9.2 G/DL (ref 11.1–15.9)
IRON SATN MFR SERPL: 18 % (ref 15–55)
IRON SERPL-MCNC: 30 UG/DL (ref 27–139)
LYMPHOCYTES # BLD MANUAL: 1.5 X10E3/UL (ref 0.7–3.1)
LYMPHOCYTES NFR BLD MANUAL: 17 %
MCH RBC QN AUTO: 30.7 PG (ref 26.6–33)
MCHC RBC AUTO-ENTMCNC: 33.2 G/DL (ref 31.5–35.7)
MCV RBC AUTO: 92 FL (ref 79–97)
MONOCYTES # BLD MANUAL: 0.5 X10E3/UL (ref 0.1–0.9)
MONOCYTES NFR BLD MANUAL: 6 %
NEUTROPHILS # BLD MANUAL: 6.4 X10E3/UL (ref 1.4–7)
NEUTROPHILS NFR BLD MANUAL: 73 %
PLATELET # BLD AUTO: 481 X10E3/UL (ref 150–450)
PLATELET BLD QL SMEAR: ABNORMAL
POTASSIUM SERPL-SCNC: 4.2 MMOL/L (ref 3.5–5.2)
PROT SERPL-MCNC: 7.2 G/DL (ref 6–8.5)
RBC # BLD AUTO: 3 X10E6/UL (ref 3.77–5.28)
RBC MORPH BLD: ABNORMAL
SODIUM SERPL-SCNC: 137 MMOL/L (ref 134–144)
TIBC SERPL-MCNC: 165 UG/DL (ref 250–450)
TSH SERPL DL<=0.005 MIU/L-ACNC: 2.14 UIU/ML (ref 0.45–4.5)
UIBC SERPL-MCNC: 135 UG/DL (ref 118–369)
VIT B12 SERPL-MCNC: 983 PG/ML (ref 232–1245)
WBC # BLD AUTO: 8.8 X10E3/UL (ref 3.4–10.8)

## 2019-12-18 ENCOUNTER — TELEPHONE (OUTPATIENT)
Dept: RHEUMATOLOGY | Age: 79
End: 2019-12-18

## 2019-12-18 NOTE — TELEPHONE ENCOUNTER
----- Message from Roxie Michaels RN sent at 12/18/2019  1:23 PM EST -----  Regarding: FW: /Dilan  Contact: [phone]    ----- Message -----  From: Arpita Laura  Sent: 12/18/2019  12:26 PM EST  To: [facility] Nurse Pool  Subject: /Refill                                 Susa Poisson called from [facility]  in regards to requesting  orders  for the pt for [facility] faxed to [phone] . Best contact number is [phone] .

## 2019-12-18 NOTE — TELEPHONE ENCOUNTER
Spoke to Belia Chao stated that a hard script and clinical note or orders is need for the pt Shadia Gordon was informed that I will inform Dr Kimberly Chris and fax the hard script over to the facility for there pharmacy, Shadia Rodriguez verbally acknowledged understanding

## 2019-12-20 ENCOUNTER — TELEPHONE (OUTPATIENT)
Dept: RHEUMATOLOGY | Age: 79
End: 2019-12-20

## 2019-12-20 NOTE — TELEPHONE ENCOUNTER
----- Message from Angel Murray sent at 12/19/2019 10:13 AM EST -----  Regarding: FW: Dr. Jose Guadalupe Terrell    ----- Message -----  From: Mary Shah  Sent: 12/19/2019  10:03 AM EST  To: [facility] Nurse Pool  Subject: Dr. Zuly Garcia first and last name:Franny([facility])      Reason for call:Rx for Humira      Callback required yes/no and why:no, if any questions      Best contact number(s):[phone]      Details to clarify the request: Franny stated a Rx for pt's Humira was supposed to be faxed on yesterday, but have not been received, please fax to .       Zulema Malone

## 2019-12-20 NOTE — TELEPHONE ENCOUNTER
Spoke to Franny Ren) informed Franny that the Humira script has been faxed to the [facility] as requested, Franny verbally acknowledged understanding

## 2019-12-20 NOTE — TELEPHONE ENCOUNTER
Faxed last clinical note and Humira script as requested by Franny and Kanika Barreto from [facility]

## 2019-12-20 NOTE — TELEPHONE ENCOUNTER
[place] [facility] calling at  [phone], about the message below. Patient has only one Humira Injection left. The fax is [phone].

## 2019-12-23 ENCOUNTER — TELEPHONE (OUTPATIENT)
Dept: RHEUMATOLOGY | Age: 79
End: 2019-12-23

## 2019-12-23 LAB
GAMMA INTERFERON BACKGROUND BLD IA-ACNC: 0.06 IU/ML
M TB IFN-G BLD-IMP: NEGATIVE
M TB IFN-G CD4+ BCKGRND COR BLD-ACNC: 0.08 IU/ML
MITOGEN IGNF BLD-ACNC: 6.27 IU/ML
QUANTIFERON INCUBATION, QF1T: NORMAL
QUANTIFERON TB2 AG: 0.12 IU/ML
SERVICE CMNT-IMP: NORMAL

## 2019-12-23 NOTE — TELEPHONE ENCOUNTER
----- Message from Casey Castillo MD sent at 12/23/2019  2:47 PM EST -----  Please let patient know that we can try switching to a different biologic. Her markers of inflammation are elevated and this could mean that humira may not be working.

## 2020-01-17 NOTE — PROGRESS NOTES
Speech Pathology bedside swallow evaluation/discharge  Patient: Reji Armenta (70 y.o. female)  Date: 7/16/2019  Primary Diagnosis: Severe sepsis ([facility] [address] [A41.9, R65.20]       Precautions: fall       ASSESSMENT :  Based on the objective data described below, the patient presents with functional oropharyngeal swallow for thin liquids. She refused purees but apparently took PO meds whole in applesauce with her RN today. Note patient has a PEG as her primary means of nutrition but drinks water and eats some purees for comfort. She has a known history of significant esophageal dysphagia as noted on previous admission. Esophagram 7/6/18, \"Markedly tertiary wave activity involving the esophagus with significant to-and-fro action of contrast within the esophagus and relatively poor emptying. \" This may increase her risk for post prandial aspiration. Note vomiting of blood this AM.   Skilled acute therapy provided by a speech-language pathologist is not indicated at this time. PLAN :  Recommendations:  Continue PEG for nutrition. Thin liquids and purees appear appropriate for comfort if interested in PO  Would limit volumes given known esophageal dysphagia and risk for post prandial aspiration  Discharge Recommendations: None     SUBJECTIVE:   Patient stated I hear my daughter. SUSANNE!!!.     OBJECTIVE:     Past Medical History:   Diagnosis Date    Baker's cyst of knee     Depression     Hypertension     RA (rheumatoid arthritis) ([facility] [address]      Past Surgical History:   Procedure Laterality Date    COLONOSCOPY N/A 2/28/2017    COLONOSCOPY performed by Sonya Echeverria MD at [facility] ENDOSCOPY    HX HERNIA REPAIR      HX UROLOGICAL      \"bladder tack\"     Prior Level of Function/Home Situation:    Home Situation  Home Environment: Skilled nursing facility  One/Two Story Residence: One story  Living Alone: No  Support Systems: Child(ed), Skilled nursing facility  Patient Expects to be Discharged to[de-identified] Skilled nursing facility  Current DME Used/Available at Home: Other (comment)(SNF)  Diet prior to admission: PEG feedings for nutrition. Puree/thin for comfort  Current Diet:  NPO   Cognitive and Communication Status:  Neurologic State: Alert, Confused  Orientation Level: Oriented to person, Disoriented to time, Disoriented to place  Cognition: Memory loss     Perseveration: Perseverates during conversation  Safety/Judgement: Decreased awareness of environment  Oral Assessment:  Oral Assessment  Labial: No impairment  Dentition: Natural  Oral Hygiene: moist, clean  Lingual: No impairment  Mandible: No impairment  P.O. Trials:  Patient Position: upright in bed  Vocal quality prior to P.O.: No impairment  Consistency Presented: Thin liquid  How Presented: SLP-fed/presented;Cup/sip     Bolus Acceptance: No impairment  Bolus Formation/Control: No impairment     Propulsion: No impairment  Oral Residue: None  Initiation of Swallow: No impairment  Laryngeal Elevation: Functional  Aspiration Signs/Symptoms: None                      NOMS:   The NOMS functional outcome measure was used to quantify this patient's level of swallowing impairment. Based on the NOMS, the patient was determined to be at level 3 for swallow function     NOMS Swallowing Levels:  Level 1 (CN): NPO  Level 2 (CM): NPO but takes consistency in therapy  Level 3 (CL): Takes less than 50% of nutrition p.o. and continues with nonoral feedings; and/or safe with mod cues; and/or max diet restriction  Level 4 (CK): Safe swallow but needs mod cues; and/or mod diet restriction; and/or still requires some nonoral feeding/supplements  Level 5 (CJ): Safe swallow with min diet restriction; and/or needs min cues  Level 6 (CI): Independent with p.o.; rare cues; usually self cues; may need to avoid some foods or needs extra time  Level 7 ([address] [place] [address]): Independent for all p.o.  DOUGLAS. (2003). National Outcomes Measurement System (NOMS): Adult Speech-Language Pathology User's Guide. Pain:  Pain Scale 1: Numeric (0 - 10)  Pain Intensity 1: 6  Pain Location 1: Abdomen  After treatment:   [] Patient left in no apparent distress sitting up in chair  [x] Patient left in no apparent distress in bed  [x] Call bell left within reach  [x] Nursing notified  [] Caregiver present  [] Bed alarm activated    COMMUNICATION/EDUCATION:   The patients plan of care including findings, recommendations, and recommended diet changes were discussed with: Registered Nurse. Patient was educated regarding role of SLP  [] Posted safety precautions in patient's room. [] Patient/family have participated as able and agree with findings and recommendations. [x] Patient is unable to participate in plan of care at this time.     Thank you for this referral.  Fela May SLP  Time Calculation: 15 mins Labile

## 2020-06-15 ENCOUNTER — VIRTUAL VISIT (OUTPATIENT)
Dept: RHEUMATOLOGY | Age: 80
End: 2020-06-15

## 2020-06-15 DIAGNOSIS — M05.79 SEROPOSITIVE RHEUMATOID ARTHRITIS OF MULTIPLE SITES (HCC): Primary | ICD-10-CM

## 2020-06-15 DIAGNOSIS — M17.0 PRIMARY OSTEOARTHRITIS OF BOTH KNEES: ICD-10-CM

## 2020-06-15 NOTE — PROGRESS NOTES
Due to the recent COVID19 outbreak, this patient's appointment today was converted to a telephone/video visit. Current outbreak of COVID19- we discussed the current CDC recommendations of practicing social distancing, only going out for needed trips to the store/pharmacy and avoiding groups of 10 or more. Discussed that people with obesity, DM, heart disease and advanced age are likely at increased risk of severe disease if they were to contract the virus. We discussed the rheum-covid project and the registry data which shows that the medications we use do not put patients at higher risk for severe disease. At this time, we are not recommending stopping these medications in asymptomatic people. We discussed holding DMARDs, biologics and EMMANUEL inhibitors in those that are exposed and have been diagnosed with COVID19. We reviewed the previous plan from the last visit. Below is a synopsis of what was covered during the phone/video call. RHEUMATOLOGY PROBLEM LIST AND CHIEF COMPLAINT  1. Rheumatoid arthritis (1995) - polyarthritis, significant deformity of multiple joints with subluxation, positive JOAN (1:160), positive CCP, positive rheumatoid factor    Therapy History:  Prior DMARDs: Remicade (allergy), hydroxychloroquine (not effective), methotrexate (noncompliant; AEs-hair loss)  Current DMARDs: Humira (stopped in 2010, 2015-04/2016, 1/2017-current)    TB  (Q-gold) test: 12/2019 - negative  Hepatitis B titers: 12/2019 - negative  Hepatitis C testing 12/2019 - negative     2. Osteoarthritis     INTERVAL HISTORY  This is a 70 y.o.  female. Today, the patient complains of pain in the joints. Location: generalized  Timing: all day   Duration:  6 months  Modifying factors: Humira  Context/Associated signs and symptoms: The patient complains of lightheadedness, some continued musculoskeletal pains, and bilateral knee swelling. She also notes decreased anxiety and improved appetite.  Patient has continued on Humira 40 mg weekly. She denies new medication changes or medical changes. RHEUMATOLOGY REVIEW OF SYSTEMS   Positives as per history  Negatives as follows:  Karissa Bañuelos:  Denies unexplained persistent fevers, weight change, chronic fatigue  HEAD/EYES:   Denies eye redness, blurry vision or sudden loss of vision, dry eyes, temporal artery pain  ENT:    Denies oral/nasal ulcers, recurrent sinus infections, dry mouth  RESPIRATORY:  No pleuritic pain, history of pleural effusions, hemoptysis, exertional dyspnea  CARDIOVASCULAR:  Denies chest pain, history of pericardial effusions  GASTRO:   Denies heartburn, esophageal dysmotility, abdominal pain, nausea, vomiting, diarrhea, blood in the stool  HEMATOLOGIC:  No easy bruising, purpura, swollen lymph nodes  SKIN:    Denies alopecia, ulcers, nodules, sun sensitivity, unexplained persistent rash   VASCULAR:   Denies edema, cyanosis, raynaud phenomenon  NEUROLOGIC:  Denies specific muscle weakness, paresthesias   PSYCHIATRIC:  No sleep disturbance / snoring, depression  MSK:    No morning stiffness >1 hour, SI joint pain    PAST MEDICAL HISTORY  Reviewed with patient, significant changes in medical history - no    FAMILY HISTORY  rheumatoid arthritis     PHYSICAL EXAM  Patient was not fully examined. LABS, RADIOLOGY AND PROCEDURES   Previous labs reviewed    10/2018  ESR >100    ASSESSMENT  1. Rheumatoid arthritis (Established problem -  Progressive disease) - The patient is doing well on Humira 40 mg weekly. Her OA pains is most likely from previous damage. For now, she should continue on Humira 40 mg weekly. I will order labs. She can follow up for knee injections in 3-4 months if needed, otherwise follow up in 1 year. 2. 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 We did not discuss this today. 3. Osteoarthritis - She has almost no range of motion in her left knee.  The patient has responded well to steroid injections in the past. OTC meds and tramadol are recommended. 4. Bone density - bone scan ordered in 12/2015 but has not been performed, she currently uses vitamin D supplementation - unchanged  5. Mild senile dementia - paranoia, psychosis, anxiety (New problem - Very good partial response) - This has considerable improvement per her daughter. PLAN  1. Humira 40 mg weekly  2. Check CBC, CMP, ESR, CRP  3. Follow up in 6 months or as needed    Ada Bishop, MD  Adult and Pediatric Rheumatology     [facility], [address] [place] [address], Phone [phone], Fax [phone]     Visiting  of Pediatrics    Department of Pediatrics, [facility] of [address] [place] [address], [address] [place] [address], Phone [phone], Fax [phone]    There are no Patient Instructions on file for this visit.     cc:  Pedro Arellano MD    Written by blanco Pascal, as dictated by Dr. Mariangel Fleming M.D.

## 2020-06-16 RX ORDER — ADALIMUMAB 40MG/0.8ML
KIT SUBCUTANEOUS
Qty: 4 KIT | Refills: 4 | Status: SHIPPED | OUTPATIENT
Start: 2020-06-16 | End: 2020-11-04 | Stop reason: ALTCHOICE

## 2020-10-19 ENCOUNTER — VIRTUAL VISIT (OUTPATIENT)
Dept: RHEUMATOLOGY | Age: 80
End: 2020-10-19
Payer: MEDICARE

## 2020-10-19 DIAGNOSIS — M05.79 SEROPOSITIVE RHEUMATOID ARTHRITIS OF MULTIPLE SITES (HCC): Primary | ICD-10-CM

## 2020-10-19 PROCEDURE — G8400 PT W/DXA NO RESULTS DOC: HCPCS | Performed by: PEDIATRICS

## 2020-10-19 PROCEDURE — G8756 NO BP MEASURE DOC: HCPCS | Performed by: PEDIATRICS

## 2020-10-19 PROCEDURE — G8428 CUR MEDS NOT DOCUMENT: HCPCS | Performed by: PEDIATRICS

## 2020-10-19 PROCEDURE — 1101F PT FALLS ASSESS-DOCD LE1/YR: CPT | Performed by: PEDIATRICS

## 2020-10-19 PROCEDURE — G0463 HOSPITAL OUTPT CLINIC VISIT: HCPCS | Performed by: PEDIATRICS

## 2020-10-19 PROCEDURE — 99214 OFFICE O/P EST MOD 30 MIN: CPT | Performed by: PEDIATRICS

## 2020-10-19 PROCEDURE — G9717 DOC PT DX DEP/BP F/U NT REQ: HCPCS | Performed by: PEDIATRICS

## 2020-10-19 PROCEDURE — 1090F PRES/ABSN URINE INCON ASSESS: CPT | Performed by: PEDIATRICS

## 2020-10-19 NOTE — PROGRESS NOTES
Due to the recent COVID19 outbreak, this patient's appointment today was converted to a telephone/video visit. Current outbreak of COVID19- we discussed the current CDC recommendations of practicing social distancing, only going out for needed trips to the store/pharmacy and avoiding groups of 10 or more. Discussed that people with obesity, DM, heart disease and advanced age are likely at increased risk of severe disease if they were to contract the virus. We discussed the rheum-covid project and the registry data which shows that the medications we use do not put patients at higher risk for severe disease. At this time, we are not recommending stopping these medications in asymptomatic people. We discussed holding DMARDs, biologics and EMMANUEL inhibitors in those that are exposed and have been diagnosed with COVID19. We reviewed the previous plan from the last visit. Below is a synopsis of what was covered during the phone/video call.  - video call with patient and telephone call with daughter      Mari Marrero  1. Rheumatoid arthritis (1995) - polyarthritis, significant deformity of multiple joints with subluxation, positive JOAN (1:160), positive CCP, positive rheumatoid factor    Therapy History:  Prior DMARDs: Remicade (allergy), hydroxychloroquine (not effective), methotrexate (noncompliant; AEs-hair loss)  Current DMARDs: Humira (stopped in 2010, 2015-04/2016, 1/2017-current)    TB  (Q-gold) test: 12/2019 - negative  Hepatitis B titers: 12/2019 - negative  Hepatitis C testing 12/2019 - negative     2. Osteoarthritis     INTERVAL HISTORY  This is a 70 y.o.  female. Today, the patient complains of pain in the joints. Location: generalized  Timing: all day   Duration:  4 months  Modifying factors: Humira  Context/Associated signs and symptoms: The patient complains of significant pain preventing her from basic activity such as touching her head.  She continues on Humira 40 mg weekly. She requests being switched to Enbrel. She denies new medication changes or medical changes. Daughter notes an upcoming surgery on 10/30. RHEUMATOLOGY REVIEW OF SYSTEMS   Positives as per history  Negatives as follows:  Rosaleen Salt:  Denies unexplained persistent fevers, weight change, chronic fatigue  HEAD/EYES:   Denies eye redness, blurry vision or sudden loss of vision, dry eyes, temporal artery pain  ENT:    Denies oral/nasal ulcers, recurrent sinus infections, dry mouth  RESPIRATORY:  No pleuritic pain, history of pleural effusions, hemoptysis, exertional dyspnea  CARDIOVASCULAR:  Denies chest pain, history of pericardial effusions  GASTRO:   Denies heartburn, esophageal dysmotility, abdominal pain, nausea, vomiting, diarrhea, blood in the stool  HEMATOLOGIC:  No easy bruising, purpura, swollen lymph nodes  SKIN:    Denies alopecia, ulcers, nodules, sun sensitivity, unexplained persistent rash   VASCULAR:   Denies edema, cyanosis, raynaud phenomenon  NEUROLOGIC:  Denies specific muscle weakness, paresthesias   PSYCHIATRIC:  No sleep disturbance / snoring, depression  MSK:    No morning stiffness >1 hour, SI joint pain    PAST MEDICAL HISTORY  Reviewed with patient, significant changes in medical history - no    FAMILY HISTORY  rheumatoid arthritis     PHYSICAL EXAM  Patient was not fully examined. LABS, RADIOLOGY AND PROCEDURES   Previous labs reviewed    10/2018  ESR >100    ASSESSMENT  1. Rheumatoid arthritis - Progressive - The patient states she is experiencing generalized joint pain today. I informed patient that she would need an in-office appointment to determine if her discomfort is related to RA or OA. Due to this, I would advise against switching medications at this time. I will order labs today to be faxed to [phone] with attention to [facility]. She should continue on Humira 40 mg weekly. Follow up in office whenever possible.    2. 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 This was not discussed today. 3. Osteoarthritis - She has almost no range of motion in her left knee. The patient has responded well to steroid injections in the past. OTC meds and tramadol are recommended. This may be contributing to her pain today. 4. Bone density - bone scan ordered in 12/2015 but has not been performed, she currently uses vitamin D supplementation - unchanged  5. Mild senile dementia - paranoia, psychosis, anxiety - This has considerable improvement per her daughter. PLAN  1. Humira 40 mg weekly - consider switching after patient is seen in office  2. Check CBC, CMP, ESR, CRP, quantiferon-TB gold  3. Follow up when possible    Ada Ramos MD  Adult and Pediatric Rheumatology     [facility], [address] [place] [address], Phone [phone], Fax [phone]     Visiting  of Pediatrics    Department of Pediatrics, [facility] of [address] [place] [address], [address] [place] [address], Phone [phone], Fax [phone]    There are no Patient Instructions on file for this visit.     cc:  Rodney Salinas MD    Written by blanco Odell, as dictated by Dr. Willey Lesch, M.D.

## 2020-10-23 ENCOUNTER — TELEPHONE (OUTPATIENT)
Dept: RHEUMATOLOGY | Age: 80
End: 2020-10-23

## 2020-10-23 NOTE — TELEPHONE ENCOUNTER
----- Message from Americo Figueroa MD sent at 10/23/2020  8:38 AM EDT -----  Regarding: RE: ROBB/TELEPHONE  Contact: [phone]  I dont have her labs   ----- Message -----  From: Felix Cardozo LPN  Sent: 09/34/7465   1:27 PM EDT  To: Americo Figueroa MD  Subject: FW: ROBB/TELEPHONE                           Did you get lab results for this patient   ----- Message -----  From: Marianne Caldera RN  Sent: 10/22/2020  11:22 AM EDT  To: Tonya Kussmaul, LPN  Subject: FW: ROBB/TELEPHONE                             ----- Message -----  From: Nunu Matos  Sent: 10/22/2020  10:57 AM EDT  To: [facility] Nurse Pool  Subject: ROBB/TELEPHONE                               General Message/Vendor Calls    Caller's first and last name: Feliciano Caraballo      Reason for call: Confirm fax was received      Callback required yes/no and why: Yes      Best contact number(s): [phone]      Details to clarify the request: Franny from [facility] calling to confirm patient's lab results was received via fax.       Angela Murray

## 2020-10-23 NOTE — TELEPHONE ENCOUNTER
Left voicemail to inform Franny per Dr Erin Graham that we have not received the lab results for the patient

## 2020-10-28 ENCOUNTER — TELEPHONE (OUTPATIENT)
Dept: RHEUMATOLOGY | Age: 80
End: 2020-10-28

## 2020-10-28 NOTE — TELEPHONE ENCOUNTER
Spoke to Jeanmarie Armenta (charge nurse at [facility]) informed Franny per Dr Dean Gamboa that the pt needs to have a office visit before she can be prescribed any new medications, Franny verbally acknowledged understanding

## 2020-10-28 NOTE — TELEPHONE ENCOUNTER
----- Message from Viet Jones RN sent at 10/28/2020  2:14 PM EDT -----  Regarding: FW: Dr. Cortez/ Telephone    ----- Message -----  From: Laura Bowen  Sent: 10/28/2020   1:48 PM EDT  To: [facility] Nurse Pool  Subject: Dr. Olimpia Monet Message/Vendor Calls    Caller's first and last name: [facility]       Reason for call: Follow up on a medication request      Callback required yes/no and why: Yes. To advise       Best contact number(s): [phone] Fax: [phone]      Details to clarify the request:  Patient is experiencing increased pain due to the morelos in the weather.  Please contact to advise if a rx is being sent        Evelyn Devine

## 2020-11-04 ENCOUNTER — OFFICE VISIT (OUTPATIENT)
Dept: RHEUMATOLOGY | Age: 80
End: 2020-11-04
Payer: MEDICARE

## 2020-11-04 VITALS
TEMPERATURE: 97.8 F | OXYGEN SATURATION: 94 % | WEIGHT: 169 LBS | HEART RATE: 72 BPM | DIASTOLIC BLOOD PRESSURE: 79 MMHG | SYSTOLIC BLOOD PRESSURE: 116 MMHG | BODY MASS INDEX: 30.91 KG/M2 | RESPIRATION RATE: 16 BRPM

## 2020-11-04 DIAGNOSIS — M05.79 SEROPOSITIVE RHEUMATOID ARTHRITIS OF MULTIPLE SITES (HCC): Primary | ICD-10-CM

## 2020-11-04 DIAGNOSIS — M17.0 PRIMARY OSTEOARTHRITIS OF BOTH KNEES: ICD-10-CM

## 2020-11-04 PROCEDURE — G8427 DOCREV CUR MEDS BY ELIG CLIN: HCPCS | Performed by: PEDIATRICS

## 2020-11-04 PROCEDURE — G8752 SYS BP LESS 140: HCPCS | Performed by: PEDIATRICS

## 2020-11-04 PROCEDURE — G8400 PT W/DXA NO RESULTS DOC: HCPCS | Performed by: PEDIATRICS

## 2020-11-04 PROCEDURE — 1170F FXNL STATUS ASSESSED: CPT | Performed by: PEDIATRICS

## 2020-11-04 PROCEDURE — 1090F PRES/ABSN URINE INCON ASSESS: CPT | Performed by: PEDIATRICS

## 2020-11-04 PROCEDURE — G8536 NO DOC ELDER MAL SCRN: HCPCS | Performed by: PEDIATRICS

## 2020-11-04 PROCEDURE — 99215 OFFICE O/P EST HI 40 MIN: CPT | Performed by: PEDIATRICS

## 2020-11-04 PROCEDURE — G9717 DOC PT DX DEP/BP F/U NT REQ: HCPCS | Performed by: PEDIATRICS

## 2020-11-04 PROCEDURE — G0463 HOSPITAL OUTPT CLINIC VISIT: HCPCS | Performed by: PEDIATRICS

## 2020-11-04 PROCEDURE — 4196F PTNOT RCVNG ANTI-RHM THXPYRA: CPT | Performed by: PEDIATRICS

## 2020-11-04 PROCEDURE — G8417 CALC BMI ABV UP PARAM F/U: HCPCS | Performed by: PEDIATRICS

## 2020-11-04 PROCEDURE — 1101F PT FALLS ASSESS-DOCD LE1/YR: CPT | Performed by: PEDIATRICS

## 2020-11-04 PROCEDURE — G8754 DIAS BP LESS 90: HCPCS | Performed by: PEDIATRICS

## 2020-11-04 RX ORDER — UPADACITINIB 15 MG/1
15 TABLET, EXTENDED RELEASE ORAL DAILY
Qty: 30 TAB | Refills: 6 | Status: SHIPPED | OUTPATIENT
Start: 2020-11-04

## 2020-11-04 RX ORDER — HALOPERIDOL 0.5 MG/1
TABLET ORAL
COMMUNITY
Start: 2020-10-22

## 2020-11-04 RX ORDER — CALAMINE, MENTHOL, WHITE PETROLATUM, ZINC OXIDE .5; .2; 69; 19.5 G/100G; G/100G; G/100G; G/100G
PASTE TOPICAL
COMMUNITY
Start: 2020-09-26

## 2020-11-04 RX ORDER — ZINC OXIDE 400 MG/G
PASTE TOPICAL
COMMUNITY
Start: 2020-10-28

## 2020-11-04 RX ORDER — CHOLECALCIFEROL (VITAMIN D3) 125 MCG
CAPSULE ORAL
COMMUNITY
Start: 2020-10-12

## 2020-11-04 RX ORDER — ESCITALOPRAM OXALATE 20 MG/1
20 TABLET ORAL
COMMUNITY

## 2020-11-04 RX ORDER — ZOLPIDEM TARTRATE 10 MG/1
10 TABLET ORAL
COMMUNITY

## 2020-11-04 RX ORDER — LOSARTAN POTASSIUM 50 MG/1
TABLET ORAL
COMMUNITY
Start: 2020-10-08

## 2020-11-04 NOTE — PROGRESS NOTES
RHEUMATOLOGY PROBLEM LIST AND CHIEF COMPLAINT  1. Rheumatoid arthritis (1995) - polyarthritis, significant deformity of multiple joints with subluxation, positive JOAN (1:160), positive CCP, positive rheumatoid factor    Therapy History:  Prior DMARDs: Remicade (allergy), hydroxychloroquine (not effective), methotrexate (noncompliant; AEs-hair loss)  Current DMARDs: Humira (stopped in 2010, 2015-04/2016, 1/2017-current)    TB  (Q-gold) test: 12/2019 - negative  Hepatitis B titers: 12/2019 - negative  Hepatitis C testing 12/2019 - negative     2. Osteoarthritis     INTERVAL HISTORY  This is a 70 y.o.  female. Today, the patient complains of pain in the joints. Location: generalized  Timing: all day   Duration:  4 months  Modifying factors: Humira  Context/Associated signs and symptoms: The patient reports significant pain in her hands, wrists, and knees preventing basic activities and interferes with sleep. She also reports pain in her bilateral shoulders with range of motion. She continues on Humira 40 mg weekly, however she states this medications no longer provides pain relief. Last Humira injection was 10/31.       RHEUMATOLOGY REVIEW OF SYSTEMS   Positives as per history  Negatives as follows:  Josette Melgar:  Denies unexplained persistent fevers, weight change, chronic fatigue  HEAD/EYES:   Denies eye redness, blurry vision or sudden loss of vision, dry eyes, temporal artery pain  ENT:    Denies oral/nasal ulcers, recurrent sinus infections, dry mouth  RESPIRATORY:  No pleuritic pain, history of pleural effusions, hemoptysis, exertional dyspnea  CARDIOVASCULAR:  Denies chest pain, history of pericardial effusions  GASTRO:   Denies heartburn, esophageal dysmotility, abdominal pain, nausea, vomiting, diarrhea, blood in the stool  HEMATOLOGIC:  No easy bruising, purpura, swollen lymph nodes  SKIN:    Denies alopecia, ulcers, nodules, sun sensitivity, unexplained persistent rash   VASCULAR:   Denies edema, cyanosis, raynaud phenomenon  NEUROLOGIC:  Denies specific muscle weakness, paresthesias   PSYCHIATRIC:  No sleep disturbance / snoring, depression  MSK:    No morning stiffness >1 hour, SI joint pain    PAST MEDICAL HISTORY  Reviewed with patient, significant changes in medical history - yes, feeding tube removed     FAMILY HISTORY  rheumatoid arthritis     PHYSICAL EXAM  Blood pressure 116/79, pulse 72, temperature 97.8 °F (36.6 °C), temperature source Temporal, resp. rate 16, weight 169 lb (76.7 kg), SpO2 94 %. GENERAL APPEARANCE: Well-nourished. Wheelchair, acute distress secondary to anxiety  NECK: No adenopathy, decreased ROM  ENT: No oral ulcers  CARDIOVASCULAR: Heart rhythm is regular. No murmur, rub, gallop  CHEST: Normal vesicular breath sounds. No wheezes, rales, pleural friction rubs  ABDOMINAL: The abdomen is soft and nontender. Bowel sounds are normal  SKIN: No rash, palpable purpura, digital ulcer, abnormal thickening   NEUROLOGICAL:  MUSCULOSKELETAL:   Upper extremities - ulnar deviation with boutonniere deformity and swan-neck deformity of bilateral hands. Subluxation of joints. Heberden's and Vivi's nodes noted. Wrist decreased ROM B/L. B/L wrist warmth, with tenderness. Bilateral 2nd digit swelling with tenderness. Lower extremities - B/l knee tenderness. Right knee warmth. L knee bony prominence completely contracted, no ROM, no synovitis. LABS, RADIOLOGY AND PROCEDURES   Previous labs reviewed    10/2018  ESR >100    ASSESSMENT  1. Rheumatoid arthritis - Progressive - Due to the patient's significant pain and worsening on exam, we will plan to escalate treatment. I recommend patient switch from Humira 40 mg weekly to Rinvoq 15 mg daily. She should stop Humira 40 mg weekly. We will begin to seek approval for Rinvoq. I recommend patient start Medrol 2 mg daily for pain relief and to decrease her inflammation. We will attempt to obtain lab results from her living facility. Follow up via telemedicine visit in 3 months. 2. 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 This was not discussed today. - unchanged    3. Osteoarthritis - She has almost no range of motion in her left knee. The patient has responded well to steroid injections in the past. OTC meds and tramadol are recommended. I suspect her bilateral shoulder pain is related to this. She can consider steroid injections. 4. Bone density - bone scan ordered in 12/2015 but has not been performed, she currently uses vitamin D supplementation - unchanged  5. Mild senile dementia - paranoia, psychosis, anxiety - This has considerable improvement per her daughter. This was not a concern today. PLAN  1. Stop Humira 40 mg weekly   2. Start Rinvoq - seek approval   3. Start Medrol 2 mg daily   4. Consider shoulder steroid injections   5. Follow up in 3 months via telemedicine    Ada Rodas MD  Adult and Pediatric Rheumatology     [address], Phone [phone], Fax [phone]     Visiting  of Pediatrics    Department of Pediatrics, [facility] of [address] [place] [address], [address] [place] [address], Phone [phone], Fax [phone]    There are no Patient Instructions on file for this visit.     cc:  Marilu Yu MD    Written by blanco Lowe, as dictated by Dr. Sandra Elizabeth M.D.

## 2020-11-04 NOTE — PROGRESS NOTES
Chief Complaint   Patient presents with    Joint Pain     1. Have you been to the ER, urgent care clinic since your last visit? Hospitalized since your last visit? No    2. Have you seen or consulted any other health care providers outside of the [address] [place] [address] since your last visit? Include any pap smears or colon screening.  No

## 2020-11-09 ENCOUNTER — TELEPHONE (OUTPATIENT)
Dept: RHEUMATOLOGY | Age: 80
End: 2020-11-09

## 2020-11-09 NOTE — TELEPHONE ENCOUNTER
----- Message from Brandon Esteban sent at 11/9/2020 11:56 AM EST -----  Regarding: FW: Dr. Evelia Doyle    ----- Message -----  From: Krystle Estrella  Sent: 11/9/2020  11:51 AM EST  To: [facility] Front Office Pool  Subject: Dr. Cortez/Telephone                              Pt request for a call back from the practice in regards to rescheduled her Fibroscan appt. Best contact number is [phone].

## 2020-11-09 NOTE — TELEPHONE ENCOUNTER
Left voicemail informing pt that our office does not order Fibroscans, that would be done thru the pt liver doctor

## 2020-11-16 ENCOUNTER — DOCUMENTATION ONLY (OUTPATIENT)
Dept: RHEUMATOLOGY | Age: 80
End: 2020-11-16

## 2020-11-27 ENCOUNTER — DOCUMENTATION ONLY (OUTPATIENT)
Dept: PHARMACY | Age: 80
End: 2020-11-27

## 2020-11-27 NOTE — PROGRESS NOTES
[facility] at [address] [place] [address] Update    Date: 11/17/2020 June Williams 1940     Medication: Rinvoq 15mg tablet    Co-pay = $0    Fill: 11/13/2020    Ship: 11/16/2020    Deliver: 11/17/2020    Next Fill Due: 12/7/2020    Pharmacist counseled the patient on:        - Use  - Dosing  - Administration  - Side effects    Handout was provided.     Houston Agarwal, [address] at [facility],  [place], [address]  phone: [phone]   fax: [phone]

## 2020-12-09 ENCOUNTER — TELEPHONE (OUTPATIENT)
Dept: RHEUMATOLOGY | Age: 80
End: 2020-12-09

## 2020-12-09 NOTE — TELEPHONE ENCOUNTER
Spoke to pt nurse Donn Hammond informed Franny per Dr Mojica Phoenix message below   Yes, lets either decrease it or stop it.  Franny verbally acknowledged understanding   Franny stated that she thinks its best to stop the medication, I verbally acknowledged understanding

## 2020-12-09 NOTE — TELEPHONE ENCOUNTER
----- Message from Stefany Ruano MD sent at 12/9/2020  1:38 PM EST -----  Regarding: RE: ROBB/TELEPHONE  Contact: [phone]  Yes, lets either decrease it or stop it.   ----- Message -----  From: Michael Skaggs LPN  Sent: 26/6/5754  12:10 PM EST  To: Stefany Ruano MD  Subject: FW: ROBB/TELEPHONE                           Please advise   ----- Message -----  From: Lilliam Ramirez  Sent: 12/9/2020  11:52 AM EST  To: [facility] Nurse [place]  Subject: ROBB/TELEPHONE                               General Message/Vendor Calls    Caller's first and last name: Aide Walker      Reason for call: Decrease Prednisone      Callback required yes/no and why: Yes      Best contact number(s): [phone]        Details to clarify the request: Aide Walker (nurse) from [facility] requesting to decrease the patient's Prednisone due to it's affecting her mood and causing agitation.       Edwar Bailey

## 2022-02-03 NOTE — PROGRESS NOTES
Gastroenterology Daily Progress Note (Dr. Manas Atkinson) [facility] Admit Date: 7/7/2018 Subjective:   
  
Patient asking to be transferred back to PCU. \"Why am I burping so much? \" Feels like food still having slow time passing but she is not vomiting up food or coughing with eating. Current Facility-Administered Medications Medication Dose Route Frequency  morphine injection 2 mg  2 mg IntraVENous Q4H PRN  
 enoxaparin (LOVENOX) injection 40 mg  40 mg SubCUTAneous Q24H  
 levoFLOXacin (LEVAQUIN) 750 mg in D5W IVPB  750 mg IntraVENous Q24H  nystatin (MYCOSTATIN) 100,000 unit/mL oral suspension 500,000 Units  500,000 Units Oral QID  sodium chloride (NS) flush 5-10 mL  5-10 mL IntraVENous PRN  
 clonazePAM (KlonoPIN) disintegrating tablet 0.5 mg  0.5 mg Oral TID  docusate sodium (COLACE) capsule 100 mg  100 mg Oral BID  folic acid (FOLVITE) tablet 1 mg  1 mg Oral DAILY  HYDROcodone-acetaminophen (NORCO) 5-325 mg per tablet 1 Tab  1 Tab Oral Q6H PRN  
 levothyroxine (SYNTHROID) tablet 25 mcg  25 mcg Oral 6am  
 atorvastatin (LIPITOR) tablet 20 mg  20 mg Oral QHS  metoprolol tartrate (LOPRESSOR) tablet 25 mg  25 mg Oral BID  therapeutic multivitamin (THERAGRAN) tablet 1 Tab  1 Tab Oral DAILY  pantoprazole (PROTONIX) tablet 40 mg  40 mg Oral ACB  traZODone (DESYREL) tablet 150 mg  150 mg Oral QHS  venlafaxine-SR (EFFEXOR-XR) capsule 150 mg  150 mg Oral DAILY  sodium chloride (NS) flush 5-10 mL  5-10 mL IntraVENous Q8H  
 acetaminophen (TYLENOL) tablet 500 mg  500 mg Oral Q4H PRN  
 naloxone (NARCAN) injection 0.4 mg  0.4 mg IntraVENous PRN  
 ondansetron (ZOFRAN) injection 2 mg  2 mg IntraVENous Q6H PRN  
 bisacodyl (DULCOLAX) tablet 5 mg  5 mg Oral DAILY PRN  
 dextrose 5% and 0.9% NaCl infusion  75 mL/hr IntraVENous CONTINUOUS  
 neomycin-polymyxin-dexamethasone (DEXACINE) 3.5 mg/g-10,000 unit/g-0.1 % ophthalmic ointment   Left Eye QID Objective:  
 
Visit Vitals  /71 (BP 1 Location: Left arm, BP Patient Position: At rest)  Pulse 78  Temp 97.9 °F (36.6 °C)  Resp 18  Ht 5' 2\" (1.575 m)  Wt 73.9 kg (163 lb)  SpO2 100%  Breastfeeding No  
 BMI 29.81 kg/m2 Blood pressure 133/71, pulse 78, temperature 97.9 °F (36.6 °C), resp. rate 18, height 5' 2\" (1.575 m), weight 73.9 kg (163 lb), SpO2 100 %, not currently breastfeeding. 07/09 1901 - 07/10 0700 In: -  
Out: 450 [Urine:450] 07/08 0701 - 07/09 1900 In: 2248.8 [I.V.:2248.8] Out: 800 [Urine:800] Intake/Output Summary (Last 24 hours) at 07/10/18 1451 Last data filed at 07/10/18 0204 Gross per 24 hour Intake            447.5 ml Output              850 ml Net           -402.5 ml Physical Exam:  
 
General: awake, WF in NAD; wearing sunglasses with eccymosis on scalp Chest:  CTA, No rhonchi, rales or rubs. Heart: S1, S2, RRR 
GI: Soft, NT, ND + bowel sounds Labs:  
 
Recent Results (from the past 24 hour(s)) METABOLIC PANEL, BASIC Collection Time: 07/10/18  5:00 AM  
Result Value Ref Range Sodium 135 (L) 136 - 145 mmol/L Potassium 3.7 3.5 - 5.1 mmol/L Chloride 100 97 - 108 mmol/L  
 CO2 28 21 - 32 mmol/L Anion gap 7 5 - 15 mmol/L Glucose 96 65 - 100 mg/dL BUN 8 6 - 20 MG/DL Creatinine 0.43 (L) 0.55 - 1.02 MG/DL  
 BUN/Creatinine ratio 19 12 - 20 GFR est AA >60 >60 ml/min/1.73m2 GFR est non-AA >60 >60 ml/min/1.73m2 Calcium 8.7 8.5 - 10.1 MG/DL  
CBC WITH AUTOMATED DIFF Collection Time: 07/10/18  5:00 AM  
Result Value Ref Range WBC 5.0 3.6 - 11.0 K/uL  
 RBC 3.13 (L) 3.80 - 5.20 M/uL HGB 9.8 (L) 11.5 - 16.0 g/dL HCT 30.4 (L) 35.0 - 47.0 % MCV 97.1 80.0 - 99.0 FL  
 MCH 31.3 26.0 - 34.0 PG  
 MCHC 32.2 30.0 - 36.5 g/dL  
 RDW 12.6 11.5 - 14.5 % PLATELET 105 603 - 560 K/uL MPV 9.4 8.9 - 12.9 FL  
 NRBC 0.0 0  WBC ABSOLUTE NRBC 0.00 0.00 - 0.01 K/uL  NEUTROPHILS 69 32 - 75 % LYMPHOCYTES 21 12 - 49 % MONOCYTES 7 5 - 13 % EOSINOPHILS 2 0 - 7 % BASOPHILS 0 0 - 1 % IMMATURE GRANULOCYTES 0 0.0 - 0.5 % ABS. NEUTROPHILS 3.5 1.8 - 8.0 K/UL  
 ABS. LYMPHOCYTES 1.1 0.8 - 3.5 K/UL  
 ABS. MONOCYTES 0.4 0.0 - 1.0 K/UL  
 ABS. EOSINOPHILS 0.1 0.0 - 0.4 K/UL  
 ABS. BASOPHILS 0.0 0.0 - 0.1 K/UL  
 ABS. IMM. GRANS. 0.0 0.00 - 0.04 K/UL  
 DF AUTOMATED Recent Labs  
   07/10/18 
 0500  07/09/18 
 0455  07/08/18 
 4915 NA  135*  137  136  
K  3.7  3.6  4.1 CL  100  102  102 CO2  28  29  30 BUN  8  7  4* CREA  0.43*  0.40*  0.41* GLU  96  94  117* CA  8.7  8.8  8.8 Recent Labs  
   07/07/18 
 0759 SGOT  16  
AP  91  
TP  7.4 ALB  3.2*  
GLOB  4.2* Impression: 
 
: Dysphagia Esophageal motility disorder Weakness and debility s/p recent fall Sepsis Hypertension RA (rheumatoid arthritis) Anxiety Depression Plan: 
Patient's EGD showed probable motor disorder but will require esophageal manometry to further deliniate exact type. R/O Achalasia vs. Other. 
-continue to eat with HOB elevated (not actually vomiting or regurgitating up food so mostly air and belching as prominent sx) 
-dysphagia diet for now 
-ask speech path to see her for further assessment 
-f/u esophageal brushing and treat with fluconazole of + yeast 
-anxiety also playing a role in her sxs likely 
-trial of 5 mg baclofen TID for esophageal spasm and reflux 
-will arrange for esophageal manometry but not able to put on schedule yet Sangeeta Wilkerson MD 
 
7/10/2018 [address] [facility], [address] [address] Loc: [phone] [FreeTextEntry1] : 31 YO FEMALE\par PT REF FOR SURGICAL CONSULT WITH DR. SAMUEL BLACKMAN OR DR MARLA ABBOTT\par ALL QUESTIONS ANSWERED\par  15 MINUTES SPENT BY THE PROVIDER, INCLUSIVE OF ALL ISSUES RELATED TO THIS ENCOUNTER ON THE DATE OF SERVICE. \par DISCUSSED PRECAUTIONS AGAINST COVID19, INCLUDING MASK WEARING, SOCIAL DISTANCING AND HAND WASHING.\par NOT VACCINATED

## 2023-02-06 NOTE — TELEPHONE ENCOUNTER
Spoke to pt daughter Priti Innocent informed pt daughter that her moms Humira had been approved until 07/18, pt daughter verbally acknowledged understanding, no further questions was asked Stage 1: Number Of Blocks?: 1

## 2024-10-31 NOTE — TELEPHONE ENCOUNTER
----- Message from Cole Whelan, RN sent at 11/19/2019  8:47 AM EST -----  Regarding: FW: Dr Cortez/Telephone      ----- Message -----  From: Sari Vivar  Sent: 11/18/2019   4:57 PM EST  To: [facility] Nurse Pool  Subject: Dr Cortez/Telephone                               General Message/Vendor Calls    Caller's first and last name: Renita Mittal Harriet Dolan      Reason for call: caller is following up on a Rx for Humira 40mg/0.8ml.  Was advised by the pharmacy that pt needs an authorization or schedule an appt for a visit      Callback required yes/no and why:yes/Clarification      Best contact number(s):[phone]      Details to clarify the request:      Zaida Polanco Additional Area 3 Units: 0 Show Forehead Units: Yes Show Mentalis Units: No Show Inventory Tab: Hide Expiration Date (Month Year): 12/31/2025 Consent: Written consent obtained. Risks include but not limited to lid/brow ptosis, bruising, swelling, diplopia, temporary effect, incomplete chemical denervation. Post-Care Instructions: Patient instructed to not lie down for 4 hours and limit physical activity for 24 hours. Forehead Units: 60 Lot #: [id number] Detail Level: Detailed Dilution (U/0.1 Cc): 10

## (undated) DEVICE — 1200 GUARD II KIT W/5MM TUBE W/O VAC TUBE: Brand: GUARDIAN

## (undated) DEVICE — NEONATAL-ADULT SPO2 SENSOR: Brand: NELLCOR

## (undated) DEVICE — BRUSH CYTO BRONCHSCP 1.5/140MM -- CELLEBRITY

## (undated) DEVICE — MEDI-VAC YANK SUCT HNDL W/TPRD BULBOUS TIP: Brand: CARDINAL HEALTH

## (undated) DEVICE — SYR 3ML LL TIP 1/10ML GRAD --

## (undated) DEVICE — SOLIDIFIER MEDC 1200ML -- CONVERT TO 356117

## (undated) DEVICE — SET ADMIN 16ML TBNG L100IN 2 Y INJ SITE IV PIGGY BK DISP

## (undated) DEVICE — BASIN EMSIS 16OZ GRAPHITE PLAS KID SHP MOLD GRAD FOR ORAL

## (undated) DEVICE — SYR 10ML LUER LOK 1/5ML GRAD --

## (undated) DEVICE — NEEDLE HYPO 18GA L1.5IN PNK S STL HUB POLYPR SHLD REG BVL

## (undated) DEVICE — FORCEPS BX L160CM DIA8MM GRSP DISECT CUP TIP NONLOCKING ROT

## (undated) DEVICE — BLOCK BITE ENDOSCP AD 21 MM W/ DIL BLU LF DISP

## (undated) DEVICE — Device: Brand: MEDEX

## (undated) DEVICE — SYR ASSEMB INFL BLLN 60ML --

## (undated) DEVICE — BASIN EMESIS 500CC ROSE 250/CS 60/PLT: Brand: MEDEGEN MEDICAL PRODUCTS, LLC

## (undated) DEVICE — TOWEL 4 PLY TISS 19X30 SUE WHT

## (undated) DEVICE — KENDALL RADIOLUCENT FOAM MONITORING ELECTRODE RECTANGULAR SHAPE: Brand: KENDALL

## (undated) DEVICE — Z DISCONTINUED PER MEDLINE LINE GAS SAMPLING O2/CO2 LNG AD 13 FT NSL W/ TBNG FILTERLINE

## (undated) DEVICE — (D)SYR 10ML 1/5ML GRAD NSAF -- PKGING CHANGE USE ITEM 338027

## (undated) DEVICE — FORCEPS BX L240CM JAW DIA2.8MM L CAP W/ NDL MIC MESH TOOTH

## (undated) DEVICE — CONTAINER SPEC 20 ML LID NEUT BUFF FORMALIN 10 % POLYPR STS

## (undated) DEVICE — BAG SPEC BIOHZRD 10 X 10 IN --

## (undated) DEVICE — CATH IV AUTOGRD BC PNK 20GA 25 -- INSYTE

## (undated) DEVICE — Device